# Patient Record
Sex: MALE | Race: WHITE | HISPANIC OR LATINO | Employment: FULL TIME | URBAN - METROPOLITAN AREA
[De-identification: names, ages, dates, MRNs, and addresses within clinical notes are randomized per-mention and may not be internally consistent; named-entity substitution may affect disease eponyms.]

---

## 2017-02-21 ENCOUNTER — APPOINTMENT (OUTPATIENT)
Dept: RADIOLOGY | Facility: MEDICAL CENTER | Age: 27
End: 2017-02-21
Attending: EMERGENCY MEDICINE
Payer: COMMERCIAL

## 2017-02-21 ENCOUNTER — HOSPITAL ENCOUNTER (EMERGENCY)
Facility: MEDICAL CENTER | Age: 27
End: 2017-02-21
Attending: EMERGENCY MEDICINE
Payer: COMMERCIAL

## 2017-02-21 VITALS
HEART RATE: 102 BPM | HEIGHT: 67 IN | RESPIRATION RATE: 16 BRPM | SYSTOLIC BLOOD PRESSURE: 149 MMHG | WEIGHT: 136.91 LBS | OXYGEN SATURATION: 98 % | DIASTOLIC BLOOD PRESSURE: 109 MMHG | BODY MASS INDEX: 21.49 KG/M2 | TEMPERATURE: 98.4 F

## 2017-02-21 DIAGNOSIS — J20.8 ACUTE BRONCHITIS DUE TO OTHER SPECIFIED ORGANISMS: ICD-10-CM

## 2017-02-21 LAB
ALBUMIN SERPL BCP-MCNC: 3.6 G/DL (ref 3.2–4.9)
ALBUMIN/GLOB SERPL: 0.9 G/DL
ALP SERPL-CCNC: 136 U/L (ref 30–99)
ALT SERPL-CCNC: 6 U/L (ref 2–50)
ANION GAP SERPL CALC-SCNC: 11 MMOL/L (ref 0–11.9)
AST SERPL-CCNC: 10 U/L (ref 12–45)
BASOPHILS # BLD AUTO: 0.5 % (ref 0–1.8)
BASOPHILS # BLD: 0.02 K/UL (ref 0–0.12)
BILIRUB SERPL-MCNC: 0.5 MG/DL (ref 0.1–1.5)
BNP SERPL-MCNC: 871 PG/ML (ref 0–100)
BUN SERPL-MCNC: 40 MG/DL (ref 8–22)
CALCIUM SERPL-MCNC: 7.3 MG/DL (ref 8.5–10.5)
CHLORIDE SERPL-SCNC: 98 MMOL/L (ref 96–112)
CO2 SERPL-SCNC: 28 MMOL/L (ref 20–33)
CREAT SERPL-MCNC: 8.36 MG/DL (ref 0.5–1.4)
EOSINOPHIL # BLD AUTO: 0.11 K/UL (ref 0–0.51)
EOSINOPHIL NFR BLD: 2.5 % (ref 0–6.9)
ERYTHROCYTE [DISTWIDTH] IN BLOOD BY AUTOMATED COUNT: 47 FL (ref 35.9–50)
GFR SERPL CREATININE-BSD FRML MDRD: 8 ML/MIN/1.73 M 2
GLOBULIN SER CALC-MCNC: 4.2 G/DL (ref 1.9–3.5)
GLUCOSE SERPL-MCNC: 91 MG/DL (ref 65–99)
HCT VFR BLD AUTO: 30.7 % (ref 42–52)
HGB BLD-MCNC: 10 G/DL (ref 14–18)
IMM GRANULOCYTES # BLD AUTO: 0.01 K/UL (ref 0–0.11)
IMM GRANULOCYTES NFR BLD AUTO: 0.2 % (ref 0–0.9)
LYMPHOCYTES # BLD AUTO: 1.05 K/UL (ref 1–4.8)
LYMPHOCYTES NFR BLD: 24.1 % (ref 22–41)
MCH RBC QN AUTO: 30.7 PG (ref 27–33)
MCHC RBC AUTO-ENTMCNC: 32.6 G/DL (ref 33.7–35.3)
MCV RBC AUTO: 94.2 FL (ref 81.4–97.8)
MONOCYTES # BLD AUTO: 0.25 K/UL (ref 0–0.85)
MONOCYTES NFR BLD AUTO: 5.7 % (ref 0–13.4)
NEUTROPHILS # BLD AUTO: 2.92 K/UL (ref 1.82–7.42)
NEUTROPHILS NFR BLD: 67 % (ref 44–72)
NRBC # BLD AUTO: 0 K/UL
NRBC BLD AUTO-RTO: 0 /100 WBC
PLATELET # BLD AUTO: 188 K/UL (ref 164–446)
PMV BLD AUTO: 10.4 FL (ref 9–12.9)
POTASSIUM SERPL-SCNC: 4.8 MMOL/L (ref 3.6–5.5)
PROT SERPL-MCNC: 7.8 G/DL (ref 6–8.2)
RBC # BLD AUTO: 3.26 M/UL (ref 4.7–6.1)
SODIUM SERPL-SCNC: 137 MMOL/L (ref 135–145)
WBC # BLD AUTO: 4.4 K/UL (ref 4.8–10.8)

## 2017-02-21 PROCEDURE — 71020 DX-CHEST-2 VIEWS: CPT

## 2017-02-21 PROCEDURE — 99283 EMERGENCY DEPT VISIT LOW MDM: CPT

## 2017-02-21 PROCEDURE — 85025 COMPLETE CBC W/AUTO DIFF WBC: CPT

## 2017-02-21 PROCEDURE — 80053 COMPREHEN METABOLIC PANEL: CPT

## 2017-02-21 PROCEDURE — 83880 ASSAY OF NATRIURETIC PEPTIDE: CPT

## 2017-02-21 PROCEDURE — 36415 COLL VENOUS BLD VENIPUNCTURE: CPT

## 2017-02-21 RX ORDER — AZITHROMYCIN 250 MG/1
TABLET, FILM COATED ORAL
Qty: 6 TAB | Refills: 0 | Status: SHIPPED | OUTPATIENT
Start: 2017-02-21 | End: 2017-02-28

## 2017-02-21 NOTE — ED AVS SNAPSHOT
Welocalize Access Code: [email or web address]  Expires: 3/14/2017 10:19 AM    Your email address is not on file at [facility].  Email Addresses are required for you to sign up for Welocalize, please contact [phone] to verify your personal information and to provide your email address prior to attempting to register for Welocalize.    Otoniel Wing  [address] [facility] [facility] [address] [place] [address]    Welocalize  A secure, online tool to manage your health information     [facility]’s Welocalize® is a secure, online tool that connects you to your personalized health information from the privacy of your home -- day or night - making it very easy for you to manage your healthcare. Once the activation process is completed, you can even access your medical information using the Welocalize joshua, which is available for free in the Apple Joshua store or Google Play store.     To learn more about Welocalize, visit [email or web address]    There are two levels of access available (as shown below):   My Chart Features  [facility] Primary Care Doctor [facility]  Specialists [facility]  Urgent  Care Non-[facility] Primary Care Doctor   Email your healthcare team securely and privately 24/7 X X X    Manage appointments: schedule your next appointment; view details of past/upcoming appointments X      Request prescription refills. X      View recent personal medical records, including lab and immunizations X X X X   View health record, including health history, allergies, medications X X X X   Read reports about your outpatient visits, procedures, consult and ER notes X X X X   See your discharge summary, which is a recap of your hospital and/or ER visit that includes your diagnosis, lab results, and care plan X X  X     How to register for [email or web address]:  Once your e-mail address has been verified, follow the following steps to sign up for [email or web address].     1. Go to  [email or web address]  2. Click on the Sign Up Now box, which takes you to the New Member  Sign Up page. You will need to provide the following information:  a. Enter your [employer] Access Code exactly as it appears at the top of this page. (You will not need to use this code after you’ve completed the sign-up process. If you do not sign up before the expiration date, you must request a new code.)   b. Enter your date of birth.   c. Enter your home email address.   d. Click Submit, and follow the next screen’s instructions.  3. Create a [facility]Plan ID. This will be your [facility] login ID and cannot be changed, so think of one that is secure and easy to remember.  4. Create a [employer] password. You can change your password at any time.  5. Enter your Password Reset Question and Answer. This can be used at a later time if you forget your password.   6. Enter your e-mail address. This allows you to receive e-mail notifications when new information is available in [employer].  7. Click Sign Up. You can now view your health information.    For assistance activating your [employer] account, call [phone]

## 2017-02-21 NOTE — ED AVS SNAPSHOT
Home Care Instructions                                                                                                                Otoniel Wing   MRN: [id number]    Department:  [facility], Emergency Dept   Date of Visit:  2/21/2017            [facility], Emergency Dept    [address]    [place] [address]    Phone:  [phone]      You were seen by     Wilfredo Gaffney M.D.      Your Diagnosis Was     Acute bronchitis due to other specified organisms     J20.8       Follow-up Information     1. Schedule an appointment as soon as possible for a visit with Pcp Pt States None.    Specialty:  Family Medicine      Medication Information     Review all of your home medications and newly ordered medications with your primary doctor and/or pharmacist as soon as possible. Follow medication instructions as directed by your doctor and/or pharmacist.     Please keep your complete medication list with you and share with your physician. Update the information when medications are discontinued, doses are changed, or new medications (including over-the-counter products) are added; and carry medication information at all times in the event of emergency situations.               Medication List      START taking these medications        Instructions    azithromycin 250 MG Tabs   Commonly known as:  ZITHROMAX    Take two tabs by mouth on day one, then one tab by mouth daily on days 2-5.         ASK your doctor about these medications        Instructions    calcitRIOL 0.5 MCG Caps   Commonly known as:  ROCALTROL    Take 1 Cap by mouth 2 Times a Day.   Dose:  0.5 mcg       Calcium Carbonate Antacid 1000 MG Chew    Take 6 Tabs by mouth every 4 hours.   Dose:  6000 mg       * fluoxetine 10 MG tablet   Commonly known as:  PROZAC    Take 1/2 tablet in the morning x 7 days and then increase to 1 tablet       * fluoxetine 20 MG tablet   Commonly known as:  PROZAC    Take 1 Tab by  mouth every day. Start after a month on 10 mg dose.   Dose:  20 mg       * Notice:  This list has 2 medication(s) that are the same as other medications prescribed for you. Read the directions carefully, and ask your doctor or other care provider to review them with you.            Procedures and tests performed during your visit     BTYPE NATRIURETIC PEPTIDE    CBC WITH DIFFERENTIAL    COMP METABOLIC PANEL    DX-CHEST-2 VIEWS    ESTIMATED GFR        Discharge Instructions       Acute Bronchitis  Bronchitis is inflammation of the airways that extend from the windpipe into the lungs (bronchi). The inflammation often causes mucus to develop. This leads to a cough, which is the most common symptom of bronchitis.   In acute bronchitis, the condition usually develops suddenly and goes away over time, usually in a couple weeks. Smoking, allergies, and asthma can make bronchitis worse. Repeated episodes of bronchitis may cause further lung problems.   CAUSES  Acute bronchitis is most often caused by the same virus that causes a cold. The virus can spread from person to person (contagious) through coughing, sneezing, and touching contaminated objects.  SIGNS AND SYMPTOMS   · Cough.    · Fever.    · Coughing up mucus.    · Body aches.    · Chest congestion.    · Chills.    · Shortness of breath.    · Sore throat.    DIAGNOSIS   Acute bronchitis is usually diagnosed through a physical exam. Your health care provider will also ask you questions about your medical history. Tests, such as chest X-rays, are sometimes done to rule out other conditions.   TREATMENT   Acute bronchitis usually goes away in a couple weeks. Oftentimes, no medical treatment is necessary. Medicines are sometimes given for relief of fever or cough. Antibiotic medicines are usually not needed but may be prescribed in certain situations. In some cases, an inhaler may be recommended to help reduce shortness of breath and control the cough. A cool mist  vaporizer may also be used to help thin bronchial secretions and make it easier to clear the chest.   HOME CARE INSTRUCTIONS  · Get plenty of rest.    · Drink enough fluids to keep your urine clear or pale yellow (unless you have a medical condition that requires fluid restriction). Increasing fluids may help thin your respiratory secretions (sputum) and reduce chest congestion, and it will prevent dehydration.    · Take medicines only as directed by your health care provider.  · If you were prescribed an antibiotic medicine, finish it all even if you start to feel better.  · Avoid smoking and secondhand smoke. Exposure to cigarette smoke or irritating chemicals will make bronchitis worse. If you are a smoker, consider using nicotine gum or skin patches to help control withdrawal symptoms. Quitting smoking will help your lungs heal faster.    · Reduce the chances of another bout of acute bronchitis by washing your hands frequently, avoiding people with cold symptoms, and trying not to touch your hands to your mouth, nose, or eyes.    · Keep all follow-up visits as directed by your health care provider.    SEEK MEDICAL CARE IF:  Your symptoms do not improve after 1 week of treatment.   SEEK IMMEDIATE MEDICAL CARE IF:  · You develop an increased fever or chills.    · You have chest pain.    · You have severe shortness of breath.  · You have bloody sputum.    · You develop dehydration.  · You faint or repeatedly feel like you are going to pass out.  · You develop repeated vomiting.  · You develop a severe headache.  MAKE SURE YOU:   · Understand these instructions.  · Will watch your condition.  · Will get help right away if you are not doing well or get worse.     This information is not intended to replace advice given to you by your health care provider. Make sure you discuss any questions you have with your health care provider.     Document Released: 01/25/2006 Document Revised: 01/08/2016 Document Reviewed:  06/10/2014  Elsevier Interactive Patient Education ©2016 Elsevier Inc.            Patient Information     Patient Information    Following emergency treatment: all patient requiring follow-up care must return either to a private physician or a clinic if your condition worsens before you are able to obtain further medical attention, please return to the emergency room.     Billing Information    At [facility], we work to make the billing process streamlined for our patients.  Our Representatives are here to answer any questions you may have regarding your hospital bill.  If you have insurance coverage and have supplied your insurance information to us, we will submit a claim to your insurer on your behalf.  Should you have any questions regarding your bill, we can be reached online or by phone as follows:  Online: You are able pay your bills online or live chat with our representatives about any billing questions you may have. We are here to help Monday - Friday from 8:00am to 7:30pm and 9:00am - 12:00pm on Saturdays.  Please visit [email or web address]  for more information.   Phone:  [phone] or [phone]    Please note that your emergency physician, surgeon, pathologist, radiologist, anesthesiologist, and other specialists are not employed by [facility] and will therefore bill separately for their services.  Please contact them directly for any questions concerning their bills at the numbers below:     Emergency Physician Services:  [phone]  [facility]:  [phone]  [facility]:  [phone]  [facility]:  [phone]    1. Your final bill may vary from the amount quoted upon discharge if all procedures are not complete at that time, or if your doctor has additional procedures of which we are not aware. You will receive an additional bill if you return to the Emergency Department at [facility] for suture removal  regardless of the facility of which the sutures were placed.     2. Please arrange for settlement of this account at the emergency registration.    3. All self-pay accounts are due in full at the time of treatment.  If you are unable to meet this obligation then payment is expected within 4-5 days.     4. If you have had radiology studies (CT, X-ray, Ultrasound, MRI), you have received a preliminary result during your emergency department visit. Please contact the radiology department [phone] to receive a copy of your final result. Please discuss the Final result with your primary physician or with the follow up physician provided.     Crisis Hotline:  [place] Crisis Hotline:  1-536-DUIABCO or 1-284.147.4260  [place] Crisis Hotline:    1-591.380.6290 or 537-090-2022         ED Discharge Follow Up Questions    1. In order to provide you with very good care, we would like to follow up with a phone call in the next few days.  May we have your permission to contact you?     YES /  NO    2. What is the best phone number to call you? (       )_____-__________    3. What is the best time to call you?      Morning  /  Afternoon  /  Evening                   Patient Signature:  ____________________________________________________________    Date:  ____________________________________________________________      Your appointments     Feb 28, 2017 11:00 AM   Follow Up Med Management with Cherise Alex M.D.   [facility] [address] [place] [address])    [address] [place] [address]   [phone]

## 2017-02-22 NOTE — DISCHARGE INSTRUCTIONS
Acute Bronchitis  Bronchitis is inflammation of the airways that extend from the windpipe into the lungs (bronchi). The inflammation often causes mucus to develop. This leads to a cough, which is the most common symptom of bronchitis.   In acute bronchitis, the condition usually develops suddenly and goes away over time, usually in a couple weeks. Smoking, allergies, and asthma can make bronchitis worse. Repeated episodes of bronchitis may cause further lung problems.   CAUSES  Acute bronchitis is most often caused by the same virus that causes a cold. The virus can spread from person to person (contagious) through coughing, sneezing, and touching contaminated objects.  SIGNS AND SYMPTOMS   · Cough.    · Fever.    · Coughing up mucus.    · Body aches.    · Chest congestion.    · Chills.    · Shortness of breath.    · Sore throat.    DIAGNOSIS   Acute bronchitis is usually diagnosed through a physical exam. Your health care provider will also ask you questions about your medical history. Tests, such as chest X-rays, are sometimes done to rule out other conditions.   TREATMENT   Acute bronchitis usually goes away in a couple weeks. Oftentimes, no medical treatment is necessary. Medicines are sometimes given for relief of fever or cough. Antibiotic medicines are usually not needed but may be prescribed in certain situations. In some cases, an inhaler may be recommended to help reduce shortness of breath and control the cough. A cool mist vaporizer may also be used to help thin bronchial secretions and make it easier to clear the chest.   HOME CARE INSTRUCTIONS  · Get plenty of rest.    · Drink enough fluids to keep your urine clear or pale yellow (unless you have a medical condition that requires fluid restriction). Increasing fluids may help thin your respiratory secretions (sputum) and reduce chest congestion, and it will prevent dehydration.    · Take medicines only as directed by your health care provider.  · If  you were prescribed an antibiotic medicine, finish it all even if you start to feel better.  · Avoid smoking and secondhand smoke. Exposure to cigarette smoke or irritating chemicals will make bronchitis worse. If you are a smoker, consider using nicotine gum or skin patches to help control withdrawal symptoms. Quitting smoking will help your lungs heal faster.    · Reduce the chances of another bout of acute bronchitis by washing your hands frequently, avoiding people with cold symptoms, and trying not to touch your hands to your mouth, nose, or eyes.    · Keep all follow-up visits as directed by your health care provider.    SEEK MEDICAL CARE IF:  Your symptoms do not improve after 1 week of treatment.   SEEK IMMEDIATE MEDICAL CARE IF:  · You develop an increased fever or chills.    · You have chest pain.    · You have severe shortness of breath.  · You have bloody sputum.    · You develop dehydration.  · You faint or repeatedly feel like you are going to pass out.  · You develop repeated vomiting.  · You develop a severe headache.  MAKE SURE YOU:   · Understand these instructions.  · Will watch your condition.  · Will get help right away if you are not doing well or get worse.     This information is not intended to replace advice given to you by your health care provider. Make sure you discuss any questions you have with your health care provider.     Document Released: 01/25/2006 Document Revised: 01/08/2016 Document Reviewed: 06/10/2014  [employer] Interactive Patient Education ©2016 [employer]

## 2017-02-22 NOTE — ED NOTES
ERP at bedside.  Pt states he has CHF and that he has not been able to tolerate his dialysis due the pain in his chest r/t to his cold symptoms.

## 2017-02-22 NOTE — ED NOTES
Patient has had a cold for a month now.  States breathing in is difficult which causes him to cough.  No noted temp fevers but some chills off and on.  Has not taken anything for it.  Says he feels fatigued from it.  Patient has a history significant for CKD.  On dialysis currently for this.   No major sob noted, no major congestion on assessment.  Vitals stable.

## 2017-02-22 NOTE — ED NOTES
Pt given discharge and follow up instructions and prescriptions, all questions answered, pt verbalized understanding.  Copy of discharge provided to pt. Signed copy in chart.  Pt states that all personal belongings are in possession.

## 2017-02-22 NOTE — ED PROVIDER NOTES
ED Provider Note    CHIEF COMPLAINT  Chief Complaint   Patient presents with   • Cold Symptoms       HPI  Otoniel Wing is a 26 y.o. male who presents for evaluation of cough, congestion, and fever. The patient states that his symptoms started one month ago. He states that the 1st 2 weeks of his symptoms he had nasal congestion, dry cough, and intermittent fever. The patient states that over the last 2 weeks he has been having an associated increase in his cough as well as some shortness of breath. The patient states that particularly over the last 2 days he's been having difficulty sleeping at night secondary to significant shortness of breath when he attempts to lie flat. The patient has a history of congestive heart failure as well as end-stage renal disease on dialysis, and states that he is been less well able to tolerate his dialysis sessions particularly over the last week secondary to his shortness of breath. Due to this constellation of symptoms, patient decided to present here for evaluation.    REVIEW OF SYSTEMS   Patient denies fever, vision change, endorses sore throat, denies chest pain, endorses shortness of breath, denies nausea, dysuria, focal muscle pain, rashes, or neurologic deficits     PAST MEDICAL HISTORY   has a past medical history of Changing skin lesion (8/17/2009); Renal disorder (L arm fistula); Renal failure; Hypertension; CHF (congestive heart failure) (CMS-[facility]) (1/27/2014); Indigestion; Anesthesia; Encounter for renal dialysis; History of anemia; Pneumonia (8/2015); Breath shortness; Chest pain (11/18/2014); and Pain.    SOCIAL HISTORY  Social History     Social History Main Topics   • Smoking status: Current Every Day Smoker -- 0.25 packs/day for 8 years     Types: Cigarettes   • Smokeless tobacco: Never Used      Comment: Smokes 6 cigarettes daily   • Alcohol Use: Yes      Comment: Social drinking, drinks 6 shots once every 2 weeks   • Drug Use: Yes      Comment:  Marijuana every 6 months    • Sexual Activity: No       SURGICAL HISTORY   has past surgical history that includes av fistula creation (2005); av fistula revision (10/13/2009); cath placement (10/13/2009); appendectomy laparoscopic (11/10/2009); av fistula revision (10/28/2013); av fistula creation (12/22/2014); and parathyroid exploration (8/10/2016).    CURRENT MEDICATIONS  Home Medications     Reviewed by Camila Ly R.N. (Registered Nurse) on 02/21/17 at 1749  Med List Status: <None>    Medication Last Dose Status    calcitRIOL (ROCALTROL) 0.5 MCG Cap not taking Active    calcium carbonate 1000 MG Chew Tab  Active    fluoxetine (PROZAC) 10 MG tablet  Active    fluoxetine (PROZAC) 20 MG tablet  Active                ALLERGIES  Allergies   Allergen Reactions   • Bloodless    • Vancomycin Itching     Pt states itching.       PHYSICAL EXAM  VITAL SIGNS: /102 mmHg  Pulse 103  Temp(Src) 36.9 °C (98.4 °F)  Resp 16  Wt 62.1 kg (136 lb 14.5 oz)  SpO2 100%   Pulse ox interpretation: I interpret this pulse ox as normal.  Constitutional: Alert in no apparent distress.  HENT: Head normocephalic, atraumatic, Bilateral external ears normal, Nose normal.  Eyes: Pupils are equal, extraocular movements intact, Conjunctiva normal, Non-icteric.   Neck: Normal range of motion, Supple, No stridor.   Lymphatic: No lymphadenopathy noted.   Cardiovascular: Regular rate and rhythm, no rubs murmurs or gallops   Thorax & Lungs: Lungs clear to auscultation bilaterally, No acute respiratory distress, No wheezing, No increased work of breathing.   Abdomen: Soft, nontender, Non-distended   Skin: Warm, Dry, No erythema, No rash.   Back: No bony tenderness, No CVA tenderness.   Extremities: Intact distal pulses, No edema, No tenderness, No cyanosis   Musculoskeletal: Good range of motion in all major joints. No tenderness to palpation or major deformities noted.   Neurologic: Alert , Normal motor function, Normal sensory function,  No focal deficits noted.   Psychiatric: Affect normal, Judgment normal, Mood normal.       DIAGNOSTIC STUDIES / PROCEDURES    LABS  Labs Reviewed   COMP METABOLIC PANEL - Abnormal; Notable for the following:     Bun 40 (*)     Creatinine 8.36 (*)     Calcium 7.3 (*)     AST(SGOT) 10 (*)     Alkaline Phosphatase 136 (*)     Globulin 4.2 (*)     All other components within normal limits   CBC WITH DIFFERENTIAL - Abnormal; Notable for the following:     WBC 4.4 (*)     RBC 3.26 (*)     Hemoglobin 10.0 (*)     Hematocrit 30.7 (*)     MCHC 32.6 (*)     All other components within normal limits   BTYPE NATRIURETIC PEPTIDE - Abnormal; Notable for the following:     B Natriuretic Peptide 871 (*)     All other components within normal limits   ESTIMATED GFR - Abnormal; Notable for the following:     GFR If  9 (*)     GFR If Non  8 (*)     All other components within normal limits         RADIOLOGY  DX-CHEST-2 VIEWS   Final Result         1.  Diffuse hazy reticular pulmonary parenchymal prominence, similar to prior study, appearance favors changes of chronic underlying lung disease.   2.  Changes of COPD          COURSE & MEDICAL DECISION MAKING  Pertinent Labs & Imaging studies reviewed. (See chart for details)    Patient with a history of chronic renal failure, on dialysis, as well as congestive heart failure presenting here for couple days worth of increased shortness of breath and cough. Here on physical examination the patient has no signs of fluid overload, and chest x-ray shows no evidence of fluid overload either. Additionally laboratory analysis shows a elevated BNP but I think this may be secondary to the patient's underlying renal failure as opposed to an alternative etiology. The patient does have some hazy changes in the pulmonary parenchyma on chest x-ray, and given this, the patient is given his chronic medical problems, poorly dispositioned to have an infection go untreated.  Given this, the patient likely has more benefit from antibiotics and potential harm. Given this, he'll be started on a prescription for azithromycin and treatment for acute bronchitis over the next couple of days. The patient will return here should he develop increasing shortness of breath or any other new or concerning symptoms. Also the patient was counseled regarding his vital signs, and recommended follow-up with his primary care doctor on an outpatient basis.    The patient will return for worsening symptoms and is stable at the time of discharge. The patient verbalizes understanding.    The patient is referred to a primary physician for blood pressure management, diabetic screening, and for all other preventative health concerns should they be present.     FINAL IMPRESSION  1. Acute bronchitis  2.   3.          Electronically signed by: Wilfredo Gaffney, 2/21/2017 7:49 PM    This record was made with a voice recognition software. I have tried to correct any grammar, spelling or context errors to the best of my ability, but errors may still remain. Interpretation of this chart should be taken in this context.

## 2017-02-27 ENCOUNTER — OFFICE VISIT (OUTPATIENT)
Dept: URGENT CARE | Facility: CLINIC | Age: 27
End: 2017-02-27
Payer: COMMERCIAL

## 2017-02-27 VITALS
HEIGHT: 68 IN | RESPIRATION RATE: 14 BRPM | OXYGEN SATURATION: 96 % | DIASTOLIC BLOOD PRESSURE: 98 MMHG | SYSTOLIC BLOOD PRESSURE: 152 MMHG | WEIGHT: 130.07 LBS | TEMPERATURE: 99 F | BODY MASS INDEX: 19.71 KG/M2 | HEART RATE: 118 BPM

## 2017-02-27 DIAGNOSIS — J20.8 ACUTE BACTERIAL BRONCHITIS: ICD-10-CM

## 2017-02-27 DIAGNOSIS — Z99.2 DIALYSIS PATIENT (HCC): ICD-10-CM

## 2017-02-27 DIAGNOSIS — B96.89 ACUTE BACTERIAL BRONCHITIS: ICD-10-CM

## 2017-02-27 DIAGNOSIS — R06.2 WHEEZING: ICD-10-CM

## 2017-02-27 PROCEDURE — 99214 OFFICE O/P EST MOD 30 MIN: CPT | Performed by: FAMILY MEDICINE

## 2017-02-27 RX ORDER — CEFDINIR 300 MG/1
300 CAPSULE ORAL
Qty: 4 CAP | Refills: 0 | Status: SHIPPED | OUTPATIENT
Start: 2017-02-27 | End: 2017-02-28

## 2017-02-27 RX ORDER — BENZONATATE 100 MG/1
200 CAPSULE ORAL 2 TIMES DAILY PRN
Qty: 30 CAP | Refills: 0 | Status: SHIPPED | OUTPATIENT
Start: 2017-02-27 | End: 2017-06-05

## 2017-02-27 RX ORDER — PREDNISONE 10 MG/1
30 TABLET ORAL EVERY MORNING
Qty: 15 TAB | Refills: 0 | Status: SHIPPED | OUTPATIENT
Start: 2017-02-27 | End: 2017-02-28

## 2017-02-27 RX ORDER — CALCIUM CARBONATE 500 MG/1
500 TABLET, CHEWABLE ORAL DAILY
COMMUNITY
End: 2017-02-28

## 2017-02-27 ASSESSMENT — ENCOUNTER SYMPTOMS
DIZZINESS: 0
FOCAL WEAKNESS: 0
CHILLS: 0
HEMOPTYSIS: 0
SHORTNESS OF BREATH: 0
FEVER: 0
ORTHOPNEA: 0

## 2017-02-27 NOTE — MR AVS SNAPSHOT
"Otoniel Wing   2017 11:30 AM   Office Visit   MRN: [id number]    Department:  [facility]   Dept Phone:  [phone]    Description:  Male : 1990   Provider:  Rodney Singh M.D.           Reason for Visit     URI uri symptoms x 3 weeks . also , vomiting  x 1 month .      Allergies as of 2017     Allergen Noted Reactions    Bloodless 2014       Vancomycin 10/26/2015   Itching    Pt states itching.      You were diagnosed with     Acute bacterial bronchitis   [533735]       Wheezing   [786.07.ICD-9-CM]       Dialysis patient (CMS-HCC)   [611003]         Vital Signs     Blood Pressure Pulse Temperature Respirations Height Weight    152/98 mmHg 118 37.2 °C (99 °F) 14 1.727 m (5' 8\") 59 kg (130 lb 1.1 oz)    Body Mass Index Oxygen Saturation Smoking Status             19.78 kg/m2 96% Current Every Day Smoker         Basic Information     Date Of Birth Sex Race Ethnicity Preferred Language    1990 Male  or   Origin (Slovak,Bermudian,Maldivian,Guamanian, etc) English      Your appointments     2017 11:00 AM   Follow Up Med Management with Cherise Alex M.D.   [facility] [address] [place] [address])    [address] [place] [address]   [phone]              Problem List              ICD-10-CM Priority Class Noted - Resolved    Cystic acne L70.0   2009 - Present    Renal failure N19   2009 - Present    Dialysis patient (CMS-HCC) Z99.2   2012 - Present    HTN (hypertension) I10   2012 - Present    Hyperkalemia E87.5   2012 - Present    ESRD (end stage renal disease) (CMS-HCC) N18.6   10/28/2013 - Present    CHF (congestive heart failure) (CMS-HCC) I50.9   2014 - Present    Pulmonary edema J81.1   2014 - Present    Anemia of chronic renal failure N18.9, D63.1   2014 - Present    Chest pain R07.9 High  2014 - Present    PNA (pneumonia) J18.9 High  2014 - Present    Volume " overload E87.70   11/19/2014 - Present    End stage renal disease (CMS-[facility]) N18.6   12/22/2014 - Present    Depressive disorder F32.9   12/28/2016 - Present      Health Maintenance        Date Due Completion Dates    IMM DTaP/Tdap/Td Vaccine (1 - Tdap) 5/23/2007 ---    IMM PNEUMOCOCCAL 19-64 (ADULT) HIGHEST RISK SERIES (2 of 3 - PCV13) 11/11/2010 11/11/2009            Current Immunizations     Influenza TIV (IM) 10/2/2016, 10/9/2015, 10/18/2014, 11/13/2013    Pneumococcal polysaccharide vaccine (PPSV-23) 11/11/2009 11:15 AM      Below and/or attached are the medications your provider expects you to take. Review all of your home medications and newly ordered medications with your provider and/or pharmacist. Follow medication instructions as directed by your provider and/or pharmacist. Please keep your medication list with you and share with your provider. Update the information when medications are discontinued, doses are changed, or new medications (including over-the-counter products) are added; and carry medication information at all times in the event of emergency situations     Allergies:  BLOODLESS - (reactions not documented)     VANCOMYCIN - Itching               Medications  Valid as of: February 27, 2017 - 12:21 PM    Generic Name Brand Name Tablet Size Instructions for use    Azithromycin (Tab) ZITHROMAX 250 MG Take two tabs by mouth on day one, then one tab by mouth daily on days 2-5.        Benzonatate (Cap) TESSALON 100 MG Take 2 Caps by mouth 2 times a day as needed for Cough.        Calcitriol (Cap) ROCALTROL 0.5 MCG Take 1 Cap by mouth 2 Times a Day.        Calcium Carbonate Antacid (Chew Tab) Calcium Carbonate Antacid 1000 MG Take 6 Tabs by mouth every 4 hours.        Calcium Carbonate Antacid (Chew Tab) TUMS 500 MG Take 500 mg by mouth every day.        Cefdinir (Cap) OMNICEF 300 MG Take 1 Cap by mouth every 48 hours for 4 doses.        FLUoxetine HCl (Tab) PROZAC 10 MG Take 1/2 tablet in the  morning x 7 days and then increase to 1 tablet        FLUoxetine HCl (Tab) PROZAC 20 MG Take 1 Tab by mouth every day. Start after a month on 10 mg dose.        PredniSONE (Tab) DELTASONE 10 MG Take 3 Tabs by mouth every morning for 5 days.        .                 Medicines prescribed today were sent to:     [facility] [address] [place] [address] (S), [place] [facility] [address] [facility]    [address] [facility] (S) [place] [address]    Phone: [phone] Fax: [phone]    Open 24 Hours?: No      Medication refill instructions:       If your prescription bottle indicates you have medication refills left, it is not necessary to call your provider’s office. Please contact your pharmacy and they will refill your medication.    If your prescription bottle indicates you do not have any refills left, you may request refills at any time through one of the following ways: The online Machinima system (except Urgent Care), by calling your provider’s office, or by asking your pharmacy to contact your provider’s office with a refill request. Medication refills are processed only during regular business hours and may not be available until the next business day. Your provider may request additional information or to have a follow-up visit with you prior to refilling your medication.   *Please Note: Medication refills are assigned a new Rx number when refilled electronically. Your pharmacy may indicate that no refills were authorized even though a new prescription for the same medication is available at the pharmacy. Please request the medicine by name with the pharmacy before contacting your provider for a refill.           Machinima Access Code: [email or web address]  Expires: 3/14/2017 10:19 AM    Machinima  A secure, online tool to manage your health information     [employer]’s Machinima® is a secure, online tool that connects you to your personalized health information from the privacy of your home -- day or night - making it very easy for you to manage your  healthcare. Once the activation process is completed, you can even access your medical information using the [employer] joshua, which is available for free in the Apple Joshua store or Google Play store.     [employer] provides the following levels of access (as shown below):   My Chart Features   [facility] Primary Care Doctor [facility]  Specialists [facility]  Urgent  Care Non-[facility]  Primary Care  Doctor   Email your healthcare team securely and privately 24/7 X X X    Manage appointments: schedule your next appointment; view details of past/upcoming appointments X      Request prescription refills. X      View recent personal medical records, including lab and immunizations X X X X   View health record, including health history, allergies, medications X X X X   Read reports about your outpatient visits, procedures, consult and ER notes X X X X   See your discharge summary, which is a recap of your hospital and/or ER visit that includes your diagnosis, lab results, and care plan. X X       How to register for [employer]:  1. Go to  [email or web address].  2. Click on the Sign Up Now box, which takes you to the New Member Sign Up page. You will need to provide the following information:  a. Enter your [employer] Access Code exactly as it appears at the top of this page. (You will not need to use this code after you’ve completed the sign-up process. If you do not sign up before the expiration date, you must request a new code.)   b. Enter your date of birth.   c. Enter your home email address.   d. Click Submit, and follow the next screen’s instructions.  3. Create a [employer] ID. This will be your [employer] login ID and cannot be changed, so think of one that is secure and easy to remember.  4. Create a [employer] password. You can change your password at any time.  5. Enter your Password Reset Question and Answer. This can be used at a later time if you forget your password.   6. Enter your e-mail address. This allows you to receive e-mail  notifications when new information is available in Qnovohart.  7. Click Sign Up. You can now view your health information.    For assistance activating your [employer] account, call [phone]

## 2017-02-27 NOTE — PROGRESS NOTES
"Subjective:      Otoniel Wing is a 26 y.o. male who presents with URI    Chief Complaint   Patient presents with   • URI     uri symptoms x 3 weeks . also , vomiting  x 1 month .        - 3 weeks w/ cough/chest congestion and sputum. Not getting better. Low grade temps Tm 99.7. No cp/sob/edema. No dizziness. Seen in ER about 4-5 days ago and had labs/XR's done. Given z-chevy. Says it helped some.     - Hx CKD/HD T/T/S.               URI   Pertinent negatives include no chest pain.       Review of Systems   Constitutional: Negative for fever and chills.   Respiratory: Negative for hemoptysis and shortness of breath.    Cardiovascular: Negative for chest pain and orthopnea.   Neurological: Negative for dizziness and focal weakness.          Objective:     /98 mmHg  Pulse 118  Temp(Src) 37.2 °C (99 °F)  Resp 14  Ht 1.727 m (5' 8\")  Wt 59 kg (130 lb 1.1 oz)  BMI 19.78 kg/m2  SpO2 96%   HR recheck 90  No pedal edema.   Physical Exam   Constitutional: He appears well-developed. No distress.   HENT:   Head: Normocephalic and atraumatic.   Mouth/Throat: Oropharynx is clear and moist.   Cardiovascular: Regular rhythm.    No murmur heard.  Pulmonary/Chest: Effort normal. He has wheezes.   Neurological: He is alert.   Skin: Skin is warm and dry.   Psychiatric: He has a normal mood and affect. Judgment normal.   Nursing note and vitals reviewed.              Assessment/Plan:         1. Acute bacterial bronchitis  cefdinir (OMNICEF) 300 MG Cap    benzonatate (TESSALON) 100 MG Cap   2. Wheezing  predniSONE (DELTASONE) 10 MG Tab       Abx QOD after dialysis  Asked him to check w/ his renal Dr's office on how often he may use tessalon before he starts taking it.       Dx & d/c instructions discussed w/ patient and/or family members. Follow up w/ Prvt Dr or here in 3-4 days if not getting better, sooner if needed,  ER if worse and UC/PCP unavailable.        Possible side effects (i.e. Rash, GI " upset/constipation, sedation, elevation of BP or sugars) of any medications given discussed.

## 2017-02-28 ENCOUNTER — APPOINTMENT (OUTPATIENT)
Dept: RADIOLOGY | Facility: MEDICAL CENTER | Age: 27
End: 2017-02-28
Attending: EMERGENCY MEDICINE
Payer: COMMERCIAL

## 2017-02-28 ENCOUNTER — HOSPITAL ENCOUNTER (OUTPATIENT)
Facility: MEDICAL CENTER | Age: 27
End: 2017-03-03
Attending: EMERGENCY MEDICINE | Admitting: HOSPITALIST
Payer: COMMERCIAL

## 2017-02-28 ENCOUNTER — RESOLUTE PROFESSIONAL BILLING HOSPITAL PROF FEE (OUTPATIENT)
Dept: HOSPITALIST | Facility: MEDICAL CENTER | Age: 27
End: 2017-02-28
Payer: COMMERCIAL

## 2017-02-28 ENCOUNTER — APPOINTMENT (OUTPATIENT)
Dept: BEHAVIORAL HEALTH | Facility: PHYSICIAN GROUP | Age: 27
End: 2017-02-28
Payer: COMMERCIAL

## 2017-02-28 DIAGNOSIS — I47.10 SVT (SUPRAVENTRICULAR TACHYCARDIA): ICD-10-CM

## 2017-02-28 DIAGNOSIS — E87.79 OTHER HYPERVOLEMIA: ICD-10-CM

## 2017-02-28 DIAGNOSIS — E87.6 HYPOKALEMIA: ICD-10-CM

## 2017-02-28 LAB
ALBUMIN SERPL BCP-MCNC: 3.9 G/DL (ref 3.2–4.9)
ALBUMIN/GLOB SERPL: 1 G/DL
ALP SERPL-CCNC: 167 U/L (ref 30–99)
ALT SERPL-CCNC: 12 U/L (ref 2–50)
ANION GAP SERPL CALC-SCNC: 13 MMOL/L (ref 0–11.9)
APAP SERPL-MCNC: <10 UG/ML (ref 10–30)
APTT PPP: 32.1 SEC (ref 24.7–36)
AST SERPL-CCNC: 12 U/L (ref 12–45)
BASOPHILS # BLD AUTO: 0.3 % (ref 0–1.8)
BASOPHILS # BLD: 0.02 K/UL (ref 0–0.12)
BILIRUB SERPL-MCNC: 0.6 MG/DL (ref 0.1–1.5)
BNP SERPL-MCNC: 1075 PG/ML (ref 0–100)
BUN SERPL-MCNC: 36 MG/DL (ref 8–22)
CALCIUM SERPL-MCNC: 9.1 MG/DL (ref 8.5–10.5)
CHLORIDE SERPL-SCNC: 97 MMOL/L (ref 96–112)
CO2 SERPL-SCNC: 25 MMOL/L (ref 20–33)
CREAT SERPL-MCNC: 6.21 MG/DL (ref 0.5–1.4)
EKG IMPRESSION: NORMAL
EOSINOPHIL # BLD AUTO: 0.01 K/UL (ref 0–0.51)
EOSINOPHIL NFR BLD: 0.1 % (ref 0–6.9)
ERYTHROCYTE [DISTWIDTH] IN BLOOD BY AUTOMATED COUNT: 47.2 FL (ref 35.9–50)
GFR SERPL CREATININE-BSD FRML MDRD: 11 ML/MIN/1.73 M 2
GLOBULIN SER CALC-MCNC: 4.1 G/DL (ref 1.9–3.5)
GLUCOSE SERPL-MCNC: 92 MG/DL (ref 65–99)
HCT VFR BLD AUTO: 30.5 % (ref 42–52)
HGB BLD-MCNC: 10.1 G/DL (ref 14–18)
IMM GRANULOCYTES # BLD AUTO: 0.02 K/UL (ref 0–0.11)
IMM GRANULOCYTES NFR BLD AUTO: 0.3 % (ref 0–0.9)
INR PPP: 1.05 (ref 0.87–1.13)
LACTATE BLD-SCNC: 1.4 MMOL/L (ref 0.5–2)
LYMPHOCYTES # BLD AUTO: 0.96 K/UL (ref 1–4.8)
LYMPHOCYTES NFR BLD: 14.4 % (ref 22–41)
MAGNESIUM SERPL-MCNC: 2.2 MG/DL (ref 1.5–2.5)
MCH RBC QN AUTO: 31.3 PG (ref 27–33)
MCHC RBC AUTO-ENTMCNC: 33.1 G/DL (ref 33.7–35.3)
MCV RBC AUTO: 94.4 FL (ref 81.4–97.8)
MONOCYTES # BLD AUTO: 0.17 K/UL (ref 0–0.85)
MONOCYTES NFR BLD AUTO: 2.5 % (ref 0–13.4)
NEUTROPHILS # BLD AUTO: 5.49 K/UL (ref 1.82–7.42)
NEUTROPHILS NFR BLD: 82.4 % (ref 44–72)
NRBC # BLD AUTO: 0 K/UL
NRBC BLD AUTO-RTO: 0 /100 WBC
PLATELET # BLD AUTO: 207 K/UL (ref 164–446)
PMV BLD AUTO: 9.9 FL (ref 9–12.9)
POTASSIUM SERPL-SCNC: 3.1 MMOL/L (ref 3.6–5.5)
PROT SERPL-MCNC: 8 G/DL (ref 6–8.2)
PROTHROMBIN TIME: 14 SEC (ref 12–14.6)
RBC # BLD AUTO: 3.23 M/UL (ref 4.7–6.1)
SODIUM SERPL-SCNC: 135 MMOL/L (ref 135–145)
T4 FREE SERPL-MCNC: 1.27 NG/DL (ref 0.53–1.43)
TROPONIN I SERPL-MCNC: 0.06 NG/ML (ref 0–0.04)
TSH SERPL DL<=0.005 MIU/L-ACNC: 0.69 UIU/ML (ref 0.3–3.7)
WBC # BLD AUTO: 6.7 K/UL (ref 4.8–10.8)

## 2017-02-28 PROCEDURE — 93005 ELECTROCARDIOGRAM TRACING: CPT | Performed by: EMERGENCY MEDICINE

## 2017-02-28 PROCEDURE — 71010 DX-CHEST-PORTABLE (1 VIEW): CPT

## 2017-02-28 PROCEDURE — 700102 HCHG RX REV CODE 250 W/ 637 OVERRIDE(OP): Performed by: HOSPITALIST

## 2017-02-28 PROCEDURE — 83605 ASSAY OF LACTIC ACID: CPT

## 2017-02-28 PROCEDURE — A9270 NON-COVERED ITEM OR SERVICE: HCPCS | Performed by: HOSPITALIST

## 2017-02-28 PROCEDURE — 90935 HEMODIALYSIS ONE EVALUATION: CPT

## 2017-02-28 PROCEDURE — 99220 PR INITIAL OBSERVATION CARE,LEVL III: CPT | Performed by: HOSPITALIST

## 2017-02-28 PROCEDURE — 85025 COMPLETE CBC W/AUTO DIFF WBC: CPT

## 2017-02-28 PROCEDURE — 99285 EMERGENCY DEPT VISIT HI MDM: CPT

## 2017-02-28 PROCEDURE — 84439 ASSAY OF FREE THYROXINE: CPT

## 2017-02-28 PROCEDURE — 36415 COLL VENOUS BLD VENIPUNCTURE: CPT

## 2017-02-28 PROCEDURE — G0378 HOSPITAL OBSERVATION PER HR: HCPCS

## 2017-02-28 PROCEDURE — 80307 DRUG TEST PRSMV CHEM ANLYZR: CPT

## 2017-02-28 PROCEDURE — 700111 HCHG RX REV CODE 636 W/ 250 OVERRIDE (IP)

## 2017-02-28 PROCEDURE — 84443 ASSAY THYROID STIM HORMONE: CPT

## 2017-02-28 PROCEDURE — 85730 THROMBOPLASTIN TIME PARTIAL: CPT

## 2017-02-28 PROCEDURE — 83735 ASSAY OF MAGNESIUM: CPT

## 2017-02-28 PROCEDURE — 85610 PROTHROMBIN TIME: CPT

## 2017-02-28 PROCEDURE — 700102 HCHG RX REV CODE 250 W/ 637 OVERRIDE(OP): Performed by: INTERNAL MEDICINE

## 2017-02-28 PROCEDURE — 80053 COMPREHEN METABOLIC PANEL: CPT

## 2017-02-28 PROCEDURE — 84484 ASSAY OF TROPONIN QUANT: CPT

## 2017-02-28 PROCEDURE — 96374 THER/PROPH/DIAG INJ IV PUSH: CPT

## 2017-02-28 PROCEDURE — A9270 NON-COVERED ITEM OR SERVICE: HCPCS | Performed by: INTERNAL MEDICINE

## 2017-02-28 PROCEDURE — 83880 ASSAY OF NATRIURETIC PEPTIDE: CPT

## 2017-02-28 PROCEDURE — 87040 BLOOD CULTURE FOR BACTERIA: CPT

## 2017-02-28 PROCEDURE — 700105 HCHG RX REV CODE 258: Performed by: EMERGENCY MEDICINE

## 2017-02-28 PROCEDURE — 700111 HCHG RX REV CODE 636 W/ 250 OVERRIDE (IP): Performed by: HOSPITALIST

## 2017-02-28 RX ORDER — METOPROLOL TARTRATE 50 MG/1
50 TABLET, FILM COATED ORAL TWICE DAILY
Status: DISCONTINUED | OUTPATIENT
Start: 2017-02-28 | End: 2017-03-02

## 2017-02-28 RX ORDER — ACETAMINOPHEN 325 MG/1
650 TABLET ORAL EVERY 6 HOURS PRN
Status: DISCONTINUED | OUTPATIENT
Start: 2017-02-28 | End: 2017-03-03 | Stop reason: HOSPADM

## 2017-02-28 RX ORDER — ONDANSETRON 2 MG/ML
4 INJECTION INTRAMUSCULAR; INTRAVENOUS EVERY 4 HOURS PRN
Status: DISCONTINUED | OUTPATIENT
Start: 2017-02-28 | End: 2017-03-03 | Stop reason: HOSPADM

## 2017-02-28 RX ORDER — LACTULOSE 20 G/30ML
30 SOLUTION ORAL
Status: DISCONTINUED | OUTPATIENT
Start: 2017-02-28 | End: 2017-03-03 | Stop reason: HOSPADM

## 2017-02-28 RX ORDER — ENEMA 19; 7 G/133ML; G/133ML
1 ENEMA RECTAL
Status: DISCONTINUED | OUTPATIENT
Start: 2017-02-28 | End: 2017-02-28

## 2017-02-28 RX ORDER — AMOXICILLIN 250 MG
1 CAPSULE ORAL NIGHTLY
Status: DISCONTINUED | OUTPATIENT
Start: 2017-02-28 | End: 2017-02-28

## 2017-02-28 RX ORDER — HEPARIN SODIUM 5000 [USP'U]/ML
5000 INJECTION, SOLUTION INTRAVENOUS; SUBCUTANEOUS EVERY 8 HOURS
Status: DISCONTINUED | OUTPATIENT
Start: 2017-02-28 | End: 2017-03-03 | Stop reason: HOSPADM

## 2017-02-28 RX ORDER — SODIUM CHLORIDE 9 MG/ML
INJECTION, SOLUTION INTRAVENOUS CONTINUOUS
Status: DISCONTINUED | OUTPATIENT
Start: 2017-02-28 | End: 2017-02-28

## 2017-02-28 RX ORDER — AMOXICILLIN 250 MG
1 CAPSULE ORAL
Status: DISCONTINUED | OUTPATIENT
Start: 2017-02-28 | End: 2017-03-03 | Stop reason: HOSPADM

## 2017-02-28 RX ORDER — POTASSIUM CHLORIDE 20 MEQ/1
40 TABLET, EXTENDED RELEASE ORAL ONCE
Status: COMPLETED | OUTPATIENT
Start: 2017-02-28 | End: 2017-02-28

## 2017-02-28 RX ORDER — ONDANSETRON 4 MG/1
4 TABLET, ORALLY DISINTEGRATING ORAL EVERY 4 HOURS PRN
Status: DISCONTINUED | OUTPATIENT
Start: 2017-02-28 | End: 2017-03-03 | Stop reason: HOSPADM

## 2017-02-28 RX ORDER — HEPARIN SODIUM 1000 [USP'U]/ML
INJECTION, SOLUTION INTRAVENOUS; SUBCUTANEOUS
Status: COMPLETED
Start: 2017-02-28 | End: 2017-02-28

## 2017-02-28 RX ORDER — BISACODYL 10 MG
10 SUPPOSITORY, RECTAL RECTAL
Status: DISCONTINUED | OUTPATIENT
Start: 2017-02-28 | End: 2017-03-03 | Stop reason: HOSPADM

## 2017-02-28 RX ORDER — PROMETHAZINE HYDROCHLORIDE 25 MG/1
12.5-25 TABLET ORAL EVERY 4 HOURS PRN
Status: DISCONTINUED | OUTPATIENT
Start: 2017-02-28 | End: 2017-03-03 | Stop reason: HOSPADM

## 2017-02-28 RX ORDER — PROMETHAZINE HYDROCHLORIDE 25 MG/1
12.5-25 SUPPOSITORY RECTAL EVERY 4 HOURS PRN
Status: DISCONTINUED | OUTPATIENT
Start: 2017-02-28 | End: 2017-03-03 | Stop reason: HOSPADM

## 2017-02-28 RX ORDER — AZITHROMYCIN 250 MG/1
250-500 TABLET, FILM COATED ORAL DAILY
Status: ON HOLD | COMMUNITY
Start: 2017-02-21 | End: 2017-03-03

## 2017-02-28 RX ORDER — HEPARIN SODIUM 1000 [USP'U]/ML
500 INJECTION, SOLUTION INTRAVENOUS; SUBCUTANEOUS
Status: DISCONTINUED | OUTPATIENT
Start: 2017-02-28 | End: 2017-03-03 | Stop reason: HOSPADM

## 2017-02-28 RX ORDER — DOCUSATE SODIUM 100 MG/1
100 CAPSULE, LIQUID FILLED ORAL 2 TIMES DAILY
Status: DISCONTINUED | OUTPATIENT
Start: 2017-02-28 | End: 2017-03-03 | Stop reason: HOSPADM

## 2017-02-28 RX ADMIN — POTASSIUM CHLORIDE 40 MEQ: 1500 TABLET, EXTENDED RELEASE ORAL at 22:17

## 2017-02-28 RX ADMIN — SODIUM CHLORIDE: 9 INJECTION, SOLUTION INTRAVENOUS at 10:18

## 2017-02-28 RX ADMIN — HEPARIN SODIUM 500 UNITS: 1000 INJECTION, SOLUTION INTRAVENOUS; SUBCUTANEOUS at 14:40

## 2017-02-28 RX ADMIN — ACETAMINOPHEN 650 MG: 325 TABLET ORAL at 20:01

## 2017-02-28 RX ADMIN — METOPROLOL TARTRATE 50 MG: 50 TABLET, FILM COATED ORAL at 18:46

## 2017-02-28 RX ADMIN — HEPARIN SODIUM 5000 UNITS: 5000 INJECTION, SOLUTION INTRAVENOUS; SUBCUTANEOUS at 20:01

## 2017-02-28 ASSESSMENT — PAIN SCALES - GENERAL
PAINLEVEL_OUTOF10: 6
PAINLEVEL_OUTOF10: 7

## 2017-02-28 ASSESSMENT — LIFESTYLE VARIABLES
TOTAL SCORE: 0
EVER HAD A DRINK FIRST THING IN THE MORNING TO STEADY YOUR NERVES TO GET RID OF A HANGOVER: NO
DO YOU DRINK ALCOHOL: YES
CONSUMPTION TOTAL: NEGATIVE
EVER FELT BAD OR GUILTY ABOUT YOUR DRINKING: NO
EVER_SMOKED: YES
TOTAL SCORE: 0
TOTAL SCORE: 0
AVERAGE NUMBER OF DAYS PER WEEK YOU HAVE A DRINK CONTAINING ALCOHOL: .5
HAVE YOU EVER FELT YOU SHOULD CUT DOWN ON YOUR DRINKING: NO
HAVE PEOPLE ANNOYED YOU BY CRITICIZING YOUR DRINKING: NO
HOW MANY TIMES IN THE PAST YEAR HAVE YOU HAD 5 OR MORE DRINKS IN A DAY: 0
PACK_YEARS: 6
ON A TYPICAL DAY WHEN YOU DRINK ALCOHOL HOW MANY DRINKS DO YOU HAVE: 3

## 2017-02-28 NOTE — IP AVS SNAPSHOT
" <p align=\"LEFT\"><IMG SRC=\"//EMRWB/blob$/Images/[email or web address]\" alt=\"Image\" WIDTH=\"50%\" HEIGHT=\"200\" BORDER=\"\"></p>                   Name:Otoniel Wing  Medical Record Number:[id number]  CSN: [id number]    YOB: 1990   Age: 26 y.o.  Sex: male  HT:1.727 m (5' 7.99\") WT: 56.6 kg (124 lb 12.5 oz)          Admit Date: 2/28/2017     Discharge Date:   Today's Date: 3/3/2017  Attending Doctor:  Abdiel Cole M.D.                  Allergies:  Bloodless and Vancomycin          Your appointments     Mar 06, 2017  1:30 PM   CARE MANAGER TELEPHONE VISIT with CARE MANAGER   [address]  [place] [address]   [phone]           ***IMPORTANT**** This is a phone visit only. Do not come into the office. The Care Manager will call you at the scheduled time for Care Manager Telephone Visit.            Mar 07, 2017  8:40 AM   Established Patient with DOMINIQUE Sanchez   [facility]    [address]  [place] [address]   [phone]           You will be receiving a confirmation call a few days before your appointment from our automated call confirmation system.            Mar 17, 2017  3:00 PM   Heart Failure New with Kimmy Godinez M.D.   [facility] (--)    [address]  [place] [address]   [phone]            Apr 07, 2017  9:00 AM   New Patient with VIVIENNE Alvarado   [facility] [address]    [address]  [place] [address]   [phone]           Please bring Photo ID, Insurance Cards, All Medication Bottles and copies of any legal documents (such as Living Will, Power of ) If speaking a language besides English please bring an adult . Please arrive 30 minutes prior for check in and registration. You will be receiving a confirmation call a few days before your appointment from our automated call " confirmation system.              Follow-up Information     1. Follow up with Pcp Pt States None.    Specialty:  Family Medicine         Medication List      Take these Medications        Instructions    benzonatate 100 MG Caps   Commonly known as:  TESSALON    Take 2 Caps by mouth 2 times a day as needed for Cough.   Dose:  200 mg       calcitRIOL 0.5 MCG Caps   Commonly known as:  ROCALTROL   Notes to Patient:  In the evening    Take 1 Cap by mouth 2 Times a Day.   Dose:  0.5 mcg       Calcium Carbonate Antacid 1000 MG Chew   Notes to Patient:  Resume home dosing    Take 4,000 mg by mouth every 6 hours.   Dose:  4000 mg       carvedilol 3.125 MG Tabs   Commonly known as:  COREG   Notes to Patient:  Take blood pressure and pulse prior to taking medication. If top number is less than 90 or pulse is less than 60 do not take medication and call cardiologist.     Take 1 Tab by mouth 2 times a day, with meals.   Dose:  3.125 mg       lisinopril 5 MG Tabs   Commonly known as:  PRINIVIL   Notes to Patient:  In the morning    Take 1 Tab by mouth every day.   Dose:  5 mg

## 2017-02-28 NOTE — IP AVS SNAPSHOT
[employer] Access Code: [email or web address]  Expires: 3/14/2017 10:19 AM    Your email address is not on file at [facility].  Email Addresses are required for you to sign up for [employer], please contact [phone] to verify your personal information and to provide your email address prior to attempting to register for [employer].    Otoniel Wing  [address] [facility] [address] [place] [address]    [employer]  A secure, online tool to manage your health information     [facility]’s [employer]® is a secure, online tool that connects you to your personalized health information from the privacy of your home -- day or night - making it very easy for you to manage your healthcare. Once the activation process is completed, you can even access your medical information using the [employer] joshua, which is available for free in the Apple Joshua store or Google Play store.     To learn more about [employer], visit [email or web address]    There are two levels of access available (as shown below):   My Chart Features  [facility] Primary Care Doctor [facility]  Specialists [facility]  Urgent  Care Non-[facility] Primary Care Doctor   Email your healthcare team securely and privately 24/7 X X X    Manage appointments: schedule your next appointment; view details of past/upcoming appointments X      Request prescription refills. X      View recent personal medical records, including lab and immunizations X X X X   View health record, including health history, allergies, medications X X X X   Read reports about your outpatient visits, procedures, consult and ER notes X X X X   See your discharge summary, which is a recap of your hospital and/or ER visit that includes your diagnosis, lab results, and care plan X X  X     How to register for [employer]:  Once your e-mail address has been verified, follow the following steps to sign up for [employer].     1. Go to  [email or web address]  2. Click on the Sign Up Now box, which takes you to the New Member  Sign Up page. You will need to provide the following information:  a. Enter your Yeexoo Access Code exactly as it appears at the top of this page. (You will not need to use this code after you’ve completed the sign-up process. If you do not sign up before the expiration date, you must request a new code.)   b. Enter your date of birth.   c. Enter your home email address.   d. Click Submit, and follow the next screen’s instructions.  3. Create a Yeexoo ID. This will be your Yeexoo login ID and cannot be changed, so think of one that is secure and easy to remember.  4. Create a Yeexoo password. You can change your password at any time.  5. Enter your Password Reset Question and Answer. This can be used at a later time if you forget your password.   6. Enter your e-mail address. This allows you to receive e-mail notifications when new information is available in Yeexoo.  7. Click Sign Up. You can now view your health information.    For assistance activating your Yeexoo account, call [phone]

## 2017-02-28 NOTE — ED NOTES
Med rec complete per Pt at bedside  Allergies reviewed  Pt completed a 5 day course of Azithromycin on 2/25  Pt also has a 4 dose Every Other Day prescription of Cefdinir which he has not started

## 2017-02-28 NOTE — IP AVS SNAPSHOT
3/3/2017          Otoniel Wing  [address] [facility] [address] [facility] [address]  [place] [address]    Dear Otoniel:    [facility] wants to ensure your discharge home is safe and you or your loved ones have had all your questions answered regarding your care after you leave the hospital.    You may receive a telephone call within two days of your discharge.  This call is to make certain you understand your discharge instructions as well as ensure we provided you with the best care possible during your stay with us.     The call will only last approximately 3-5 minutes and will be done by a nurse.    Once again, we want to ensure your discharge home is safe and that you have a clear understanding of any next steps in your care.  If you have any questions or concerns, please do not hesitate to contact us, we are here for you.  Thank you for choosing [facility] for your healthcare needs.    Sincerely,    Rafael Pascual    [facility]

## 2017-02-28 NOTE — IP AVS SNAPSHOT
" Home Care Instructions                                                                                                                  Name:Otoniel Wing  Medical Record Number:[id number]  CSN: [id number]    YOB: 1990   Age: 26 y.o.  Sex: male  HT:1.727 m (5' 7.99\") WT: 56.6 kg (124 lb 12.5 oz)          Admit Date: 2/28/2017     Discharge Date:   Today's Date: 3/3/2017  Attending Doctor:  Abdiel Cole M.D.                  Allergies:  Bloodless and Vancomycin            Discharge Instructions       Discharge Instructions    Discharged to home by car with relative. Discharged via wheelchair, hospital escort: Yes.  Special equipment needed: Not Applicable    Be sure to schedule a follow-up appointment with your primary care doctor or any specialists as instructed.     Discharge Plan:   Diet Plan: Discussed  Activity Level: Discussed  Confirmed Follow up Appointment: Appointment Scheduled  Confirmed Symptoms Management: Discussed  Medication Reconciliation Updated: Yes  Pneumococcal Vaccine Given - only chart on this line when given: Given (See MAR)  Influenza Vaccine Indication: Not indicated: Previously immunized this influenza season and > 8 years of age    I understand that a diet low in cholesterol, fat, and sodium is recommended for good health. Unless I have been given specific instructions below for another diet, I accept this instruction as my diet prescription.   Other diet: low sodium (less than 2 grams), low fat cardiac diet.    Special Instructions:   HF Patient Discharge Instructions  · Monitor your weight daily, and maintain a weight chart, to track your weight changes.   · Activity as tolerated, unless your Doctor has ordered otherwise. Other activity order  · Follow a low fat, low cholesterol, low salt diet unless instructed otherwise by your Doctor. Read the labels on the back of food products and track your intake of fat, cholesterol and salt.   · Fluid Restriction No. If a " Fluid Restriction has been ordered by your Doctor, measure fluids with a measuring cup to ensure that you are not exceeding the restriction.   · No smoking.  · Oxygen No. If your Doctor has ordered that you wear Oxygen at home, it is important to wear it as ordered.  · Did you receive an explanation from staff on the importance of taking each of your medications and why it is necessary to keep taking them unless your doctor says to stop? Yes  · Were all of your questions answered about how to manage your heart failure and what to do if you have increased signs and symptoms after you go home? Yes  · Do you feel like your heart failure care team involved you in the care treatment plan and allowed you to make decisions regarding your care while in the hospital and addressed any discharge needs you might have? Yes    See the educational handout provided at discharge for more information on monitoring your daily weight, activity and diet. This also explains more about Heart Failure, symptoms of a flare-up and some of the tests that you have undergone.     Warning Signs of a Flare-Up include:  · Swelling in the ankles or lower legs.  · Shortness of breath, while at rest, or while doing normal activities.   · Shortness of breath at night when in bed, or coughing in bed.   · Requiring more pillows to sleep at night, or needing to sit up at night to sleep.  · Feeling weak, dizzy or fatigued.     When to call your Doctor:  · [facility] [address] [place] [address] about questions regarding the discharge instructions you were given [phone].  · Call your Primary Care Physician or Cardiologist if:   1. You experience any pain radiating to your jaw or neck.  2. You have any difficulty breathing.  3. You experience weight gain of 3 lbs in a day or 5 lbs in a week.   4. You feel any palpitations or irregular heartbeats.  5. You become dizzy or lose consciousness.   If you have had an angiogram or had a pacemaker or AICD placed, and  experience:  1. Bleeding, drainage or swelling at the surgical / puncture site.  2. Fever greater than 100.0 F  3. Shock from internal defibrillator.  4. Cool and / or numb extremities.      · Is patient discharged on Warfarin / Coumadin?   No     · Is patient Post Blood Transfusion?  No    Depression / Suicide Risk    As you are discharged from this [facility] facility, it is important to learn how to keep safe from harming yourself.    Recognize the warning signs:  · Abrupt changes in personality, positive or negative- including increase in energy   · Giving away possessions  · Change in eating patterns- significant weight changes-  positive or negative  · Change in sleeping patterns- unable to sleep or sleeping all the time   · Unwillingness or inability to communicate  · Depression  · Unusual sadness, discouragement and loneliness  · Talk of wanting to die  · Neglect of personal appearance   · Rebelliousness- reckless behavior  · Withdrawal from people/activities they love  · Confusion- inability to concentrate     If you or a loved one observes any of these behaviors or has concerns about self-harm, here's what you can do:  · Talk about it- your feelings and reasons for harming yourself  · Remove any means that you might use to hurt yourself (examples: pills, rope, extension cords, firearm)  · Get professional help from the community (Mental Health, Substance Abuse, psychological counseling)  · Do not be alone:Call your Safe Contact- someone whom you trust who will be there for you.  · Call your local CRISIS HOTLINE 647-9153 or 735-559-0622  · Call your local Children's Mobile Crisis Response Team [place] (983) 307-3251 or www.[employer]  · Call the toll free National Suicide Prevention Hotlines   · National Suicide Prevention Lifeline 104-990-JYCJ (2056)  · National Hope Line Network 800-SUICIDE (676-9599)    Lisinopril tablets  What is this medicine?  LISINOPRIL (lyse IN oh pril) is an ACE  inhibitor. This medicine is used to treat high blood pressure and heart failure. It is also used to protect the heart immediately after a heart attack.  This medicine may be used for other purposes; ask your health care provider or pharmacist if you have questions.  COMMON BRAND NAME(S): Prinivil, Zestril  What should I tell my health care provider before I take this medicine?  They need to know if you have any of these conditions:  -diabetes  -heart or blood vessel disease  -immune system disease like lupus or scleroderma  -kidney disease  -low blood pressure  -previous swelling of the tongue, face, or lips with difficulty breathing, difficulty swallowing, hoarseness, or tightening of the throat  -an unusual or allergic reaction to lisinopril, other ACE inhibitors, insect venom, foods, dyes, or preservatives  -pregnant or trying to get pregnant  -breast-feeding  How should I use this medicine?  Take this medicine by mouth with a glass of water. Follow the directions on your prescription label. You may take this medicine with or without food. Take your medicine at regular intervals. Do not stop taking this medicine except on the advice of your doctor or health care professional.  Talk to your pediatrician regarding the use of this medicine in children. Special care may be needed. While this drug may be prescribed for children as young as 6 years of age for selected conditions, precautions do apply.  Overdosage: If you think you have taken too much of this medicine contact a poison control center or emergency room at once.  NOTE: This medicine is only for you. Do not share this medicine with others.  What if I miss a dose?  If you miss a dose, take it as soon as you can. If it is almost time for your next dose, take only that dose. Do not take double or extra doses.  What may interact with this medicine?  -diuretics  -lithium  -NSAIDs, medicines for pain and inflammation, like ibuprofen or naproxen  -over-the-counter  herbal supplements like hawthorn  -potassium salts or potassium supplements  -salt substitutes  This list may not describe all possible interactions. Give your health care provider a list of all the medicines, herbs, non-prescription drugs, or dietary supplements you use. Also tell them if you smoke, drink alcohol, or use illegal drugs. Some items may interact with your medicine.  What should I watch for while using this medicine?  Visit your doctor or health care professional for regular check ups. Check your blood pressure as directed. Ask your doctor what your blood pressure should be, and when you should contact him or her. Call your doctor or health care professional if you notice an irregular or fast heart beat.  Women should inform their doctor if they wish to become pregnant or think they might be pregnant. There is a potential for serious side effects to an unborn child. Talk to your health care professional or pharmacist for more information.  Check with your doctor or health care professional if you get an attack of severe diarrhea, nausea and vomiting, or if you sweat a lot. The loss of too much body fluid can make it dangerous for you to take this medicine.  You may get drowsy or dizzy. Do not drive, use machinery, or do anything that needs mental alertness until you know how this drug affects you. Do not stand or sit up quickly, especially if you are an older patient. This reduces the risk of dizzy or fainting spells. Alcohol can make you more drowsy and dizzy. Avoid alcoholic drinks.  Avoid salt substitutes unless you are told otherwise by your doctor or health care professional.  Do not treat yourself for coughs, colds, or pain while you are taking this medicine without asking your doctor or health care professional for advice. Some ingredients may increase your blood pressure.  What side effects may I notice from receiving this medicine?  Side effects that you should report to your doctor or health  care professional as soon as possible:  -abdominal pain with or without nausea or vomiting  -allergic reactions like skin rash or hives, swelling of the hands, feet, face, lips, throat, or tongue  -dark urine  -difficulty breathing  -dizzy, lightheaded or fainting spell  -fever or sore throat  -irregular heart beat, chest pain  -pain or difficulty passing urine  -redness, blistering, peeling or loosening of the skin, including inside the mouth  -unusually weak  -yellowing of the eyes or skin  Side effects that usually do not require medical attention (report to your doctor or health care professional if they continue or are bothersome):  -change in taste  -cough  -decreased sexual function or desire  -headache  -sun sensitivity  -tiredness  This list may not describe all possible side effects. Call your doctor for medical advice about side effects. You may report side effects to FDA at 2-855-FDA-9203.  Where should I keep my medicine?  Keep out of the reach of children.  Store at room temperature between 15 and 30 degrees C (59 and 86 degrees F). Protect from moisture. Keep container tightly closed. Throw away any unused medicine after the expiration date.  NOTE: This sheet is a summary. It may not cover all possible information. If you have questions about this medicine, talk to your doctor, pharmacist, or health care provider.  © 2014, Elsevier/Gold Standard. (6/22/2009 5:36:32 PM)    Carvedilol tablets  What is this medicine?  CARVEDILOL (DIVINA ve dil ol) is a beta-blocker. Beta-blockers reduce the workload on the heart and help it to beat more regularly. This medicine is used to treat high blood pressure and heart failure.  This medicine may be used for other purposes; ask your health care provider or pharmacist if you have questions.  COMMON BRAND NAME(S): Coreg  What should I tell my health care provider before I take this medicine?  They need to know if you have any of these conditions:  -circulation  problems  -diabetes  -history of heart attack or heart disease  -liver disease  -lung or breathing disease, like asthma or emphysema  -pheochromocytoma  -slow or irregular heartbeat  -thyroid disease  -an unusual or allergic reaction to carvedilol, other beta-blockers, medicines, foods, dyes, or preservatives  -pregnant or trying to get pregnant  -breast-feeding  How should I use this medicine?  Take this medicine by mouth with a glass of water. Follow the directions on the prescription label. It is best to take the tablets with food. Take your doses at regular intervals. Do not take your medicine more often than directed. Do not stop taking except on the advice of your doctor or health care professional.  Talk to your pediatrician regarding the use of this medicine in children. Special care may be needed.  Overdosage: If you think you have taken too much of this medicine contact a poison control center or emergency room at once.  NOTE: This medicine is only for you. Do not share this medicine with others.  What if I miss a dose?  If you miss a dose, take it as soon as you can. If it is almost time for your next dose, take only that dose. Do not take double or extra doses.  What may interact with this medicine?  Do not take this medicine with any of the following medications:  -sotalol  This medicine may also interact with the following medications:  -cimetidine  -clonidine  -cyclosporine  -digoxin  -MAOIs like Carbex, Eldepryl, Marplan, Nardil, and Parnate  -medicines for blood pressure, heart disease, irregular heart beat  -medicines for depression like fluoxetine and paroxetine  -medicines for diabetes  -medicines to control heart rhythm like propafenone and quinidine  -reserpine  -rifampin  This list may not describe all possible interactions. Give your health care provider a list of all the medicines, herbs, non-prescription drugs, or dietary supplements you use. Also tell them if you smoke, drink alcohol, or  use illegal drugs. Some items may interact with your medicine.  What should I watch for while using this medicine?  Check your heart rate and blood pressure regularly while you are taking this medicine. Ask your doctor or health care professional what your heart rate and blood pressure should be, and when you should contact him or her. Do not stop taking this medicine suddenly. This could lead to serious heart-related effects.  Contact your doctor or health care professional if you have difficulty breathing while taking this drug.  Check your weight daily. Ask your doctor or health care professional when you should notify him/her of any weight gain.  You may get drowsy or dizzy. Do not drive, use machinery, or do anything that requires mental alertness until you know how this medicine affects you. To reduce the risk of dizzy or fainting spells, do not sit or stand up quickly. Alcohol can make you more drowsy, and increase flushing and rapid heartbeats. Avoid alcoholic drinks.  If you have diabetes, check your blood sugar as directed. Tell your doctor if you have changes in your blood sugar while you are taking this medicine.  If you are going to have surgery, tell your doctor or health care professional that you are taking this medicine.  What side effects may I notice from receiving this medicine?  Side effects that you should report to your doctor or health care professional as soon as possible:  -allergic reactions like skin rash, itching or hives, swelling of the face, lips, or tongue  -breathing problems  -dark urine  -irregular heartbeat  -swollen legs or ankles  -vomiting  -yellowing of the eyes or skin  Side effects that usually do not require medical attention (report to your doctor or health care professional if they continue or are bothersome):  -change in sex drive or performance  -diarrhea  -dry eyes (especially if wearing contact lenses)  -dry, itching skin  -headache  -nausea  -unusually tired  This  list may not describe all possible side effects. Call your doctor for medical advice about side effects. You may report side effects to FDA at 0-143-IDT-9492.  Where should I keep my medicine?  Keep out of the reach of children.  Store at room temperature below 30 degrees C (86 degrees F). Protect from moisture. Keep container tightly closed. Throw away any unused medicine after the expiration date.  NOTE: This sheet is a summary. It may not cover all possible information. If you have questions about this medicine, talk to your doctor, pharmacist, or health care provider.  © 2014, [employer] Standard. (3/12/2009 2:39:22 PM)    Paroxysmal Supraventricular Tachycardia  Paroxysmal supraventricular tachycardia (PSVT) is when your heart beats very quickly and then suddenly stops beating so quickly. You may or may not have any symptoms when this occurs. It is usually not dangerous. It can lead to problems if it happens often or it lasts a long time.  HOME CARE   · Take medicines only as told by your doctor.  · Avoid caffeine or limit how much of it you consume as told by your doctor. Caffeine is found in coffee, tea, soda, and chocolate.  · Avoid alcohol or limit how much of it you drink as told by your doctor.  · Do not smoke.  · Try to get at least 7 hours of sleep each night.  · Find healthy ways to reduce stress.  · Do self-treatments as told by your doctor to slow down your heart (vagus nerve stimulation). Your doctor may tell you to:  ¨ Hold your breath and push, as though you are going to the bathroom.  ¨ Rub an area on one side of your neck.  ¨ Bend forward with your head between your legs.  ¨ Bend forward with your head between your legs, then cough.  ¨ Rub your eyeballs with your eyes closed.  · Maintain a healthy weight.  · Get some exercise on most days. Ask your doctor about some good activities for you.  GET HELP IF:  · You are having episodes of a fast heartbeat more often.  · Your episodes are lasting  longer.  · Your self-treatments to slow down your heart are no longer helping.  · You have new symptoms during an episode.  GET HELP RIGHT AWAY IF:  · You have chest pain.  · You have trouble breathing.  · You have an episode of a fast heartbeat that lasts longer than 20 minutes.  · You pass out (faint).  These symptoms may be an emergency. Do not wait to see if the symptoms will go away. Get medical help right away. Call your local emergency services (911 in the [place] Do not drive yourself to the hospital.     This information is not intended to replace advice given to you by your health care provider. Make sure you discuss any questions you have with your health care provider.     Document Released: 12/18/2006 Document Revised: 01/08/2016 Document Reviewed: 05/28/2015  [employer] Interactive Patient Education ©2016 [employer]        Your appointments     Mar 06, 2017  1:30 PM   CARE MANAGER TELEPHONE VISIT with CARE MANAGER   [address]  [place] [address]   [phone]           ***IMPORTANT**** This is a phone visit only. Do not come into the office. The Care Manager will call you at the scheduled time for Care Manager Telephone Visit.            Mar 07, 2017  8:40 AM   Established Patient with DOMINIQUE Sanchez   [address]  [place] [address]   [phone]           You will be receiving a confirmation call a few days before your appointment from our automated call confirmation system.            Mar 17, 2017  3:00 PM   Heart Failure New with Kimmy Godinez M.D.   [facility] (--)    [address], [address]  [place] [address]   [phone]            Apr 07, 2017  9:00 AM   New Patient with VIVIENNE Alvarado   [facility] [address]    [address]  [address]  [place] [address]   [phone]           Please bring Photo ID,  Insurance Cards, All Medication Bottles and copies of any legal documents (such as Living Will, Power of ) If speaking a language besides English please bring an adult . Please arrive 30 minutes prior for check in and registration. You will be receiving a confirmation call a few days before your appointment from our automated call confirmation system.              Follow-up Information     1. Follow up with Pcp Pt States None.    Specialty:  Family Medicine         Discharge Medication Instructions:    Below are the medications your physician expects you to take upon discharge:    Review all your home medications and newly ordered medications with your doctor and/or pharmacist. Follow medication instructions as directed by your doctor and/or pharmacist.    Please keep your medication list with you and share with your physician.               Medication List      START taking these medications        Instructions    calcitRIOL 0.5 MCG Caps   Last time this was given:  0.5 mcg on 3/3/2017  8:29 AM   Commonly known as:  ROCALTROL   Next Dose Due:  3/3/2017   Notes to Patient:  In the evening    Take 1 Cap by mouth 2 Times a Day.   Dose:  0.5 mcg       carvedilol 3.125 MG Tabs   Commonly known as:  COREG   Next Dose Due:  3/3/2017   Notes to Patient:  Take blood pressure and pulse prior to taking medication. If top number is less than 90 or pulse is less than 60 do not take medication and call cardiologist.     Take 1 Tab by mouth 2 times a day, with meals.   Dose:  3.125 mg       lisinopril 5 MG Tabs   Last time this was given:  5 mg on 3/3/2017  8:28 AM   Commonly known as:  PRINIVIL   Next Dose Due:  3/4/2017   Notes to Patient:  In the morning    Take 1 Tab by mouth every day.   Dose:  5 mg         CONTINUE taking these medications        Instructions    benzonatate 100 MG Caps   Commonly known as:  TESSALON    Take 2 Caps by mouth 2 times a day as needed for Cough.   Dose:  200 mg       Calcium  Carbonate Antacid 1000 MG Chew   Last time this was given:  1,000 mg on 3/3/2017 10:49 AM   Next Dose Due:  3/3/2017   Notes to Patient:  Resume home dosing    Take 4,000 mg by mouth every 6 hours.   Dose:  4000 mg         STOP taking these medications     azithromycin 250 MG Tabs   Commonly known as:  ZITHROMAX               Instructions           Diet / Nutrition:    Follow any diet instructions given to you by your doctor or the dietician, including how much salt (sodium) you are allowed each day.    If you are overweight, talk to your doctor about a weight reduction plan.    Activity:    Remain physically active following your doctor's instructions about exercise and activity.    Rest often.     Any time you become even a little tired or short of breath, SIT DOWN and rest.    Worsening Symptoms:    Report any of the following signs and symptoms to the doctor's office immediately:    *Pain of jaw, arm, or neck  *Chest pain not relieved by medication                               *Dizziness or loss of consciousness  *Difficulty breathing even when at rest   *More tired than usual                                       *Bleeding drainage or swelling of surgical site  *Swelling of feet, ankles, legs or stomach                 *Fever (>100ºF)  *Pink or blood tinged sputum  *Weight gain (3lbs/day or 5lbs /week)           *Shock from internal defibrillator (if applicable)  *Palpitations or irregular heartbeats                *Cool and/or numb extremities    Stroke Awareness    Common Risk Factors for Stroke include:    Age  Atrial Fibrillation  Carotid Artery Stenosis  Diabetes Mellitus  Excessive alcohol consumption  High blood pressure  Overweight   Physical inactivity  Smoking    Warning signs and symptoms of a stroke include:    *Sudden numbness or weakness of the face, arm or leg (especially on one side of the body).  *Sudden confusion, trouble speaking or understanding.  *Sudden trouble seeing in one or both  eyes.  *Sudden trouble walking, dizziness, loss of balance or coordination.Sudden severe headache with no known cause.    It is very important to get treatment quickly when a stroke occurs. If you experience any of the above warning signs, call 911 immediately.                   Disclaimer         Quit Smoking / Tobacco Use:    I understand the use of any tobacco products increases my chance of suffering from future heart disease or stroke and could cause other illnesses which may shorten my life. Quitting the use of tobacco products is the single most important thing I can do to improve my health. For further information on smoking / tobacco cessation call a Toll Free Quit Line at 1-982.399.8470 (*[employer] [place]) or 1-900.952.5680 (American [employer]) or you can access the web based program at www.[facility].org.    [place] Tobacco Users Help Line:  (926) 827-7829       Toll Free: 1-771.914.8834  Quit Tobacco Program [facility] ([phone]    Crisis Hotline:    [place] Crisis Hotline:  6-281-CGAQTYM or 1-357.139.4095    [place] Crisis Hotline:    1-612.227.7388 or 638-835-4637    Discharge Survey:   Thank you for choosing [facility]. We hope we did everything we could to make your hospital stay a pleasant one. You may be receiving a phone survey and we would appreciate your time and participation in answering the questions. Your input is very valuable to us in our efforts to improve our service to our patients and their families.        My signature on this form indicates that:    1. I have reviewed and understand the above information.  2. My questions regarding this information have been answered to my satisfaction.  3. I have formulated a plan with my discharge nurse to obtain my prescribed medications for home.                  Disclaimer         __________________________________                     __________       ________                       Patient Signature                                                  Date                    Time

## 2017-02-28 NOTE — ED PROVIDER NOTES
ED Provider Note    CHIEF COMPLAINT  Chief Complaint   Patient presents with   • Supraventricular Tachycardia (SVT)   • Shortness of Breath       HPI  Otoniel Wing is a 26 y.o. male with a history of end-stage renal disease on hemodialysis, congestive heart failure, hypertension, who presents by EMS from the dialysis center for [facility] after developing palpitations and a rapid heart rate while undergoing dialysis. The patient had been there for about 1 hour, when he began complaining of a rapid heart rate, and was noted to be on SVT. He was given a 600 mL bolus of saline, and then transferred to this facility. The patient says she has been sick with cough, congestion, and fever and chills for the past week. He denies any nausea, vomit, or diarrhea. He does complain of some pain in his chest from coughing. He denies any history of heart problems. Per EMS, the patient spontaneously converted to a sinus tachycardia in route.    REVIEW OF SYSTEMS  See HPI for further details. All other systems are negative.     PAST MEDICAL HISTORY  Past Medical History   Diagnosis Date   • Changing skin lesion 8/17/2009   • Renal disorder L arm fistula   • Renal failure    • Hypertension    • CHF (congestive heart failure) (CMS-HCC) 1/27/2014   • Indigestion    • Anesthesia      PONV-non recently   • Encounter for renal dialysis      Tues, Thurs, Sat   • History of anemia      due to renal function   • Pneumonia 8/2015   • Breath shortness      with chf exacerbation/fluid overload   • Chest pain 11/18/2014   • Pain      bone pain due to parathyroid       FAMILY HISTORY  Family History   Problem Relation Age of Onset   • Depression Mother        SOCIAL HISTORY  Social History     Social History   • Marital Status: Single     Spouse Name: N/A   • Number of Children: N/A   • Years of Education: N/A     Social History Main Topics   • Smoking status: Former Smoker -- 0.25 packs/day for 8 years     Quit date: 11/28/2016  "  • Smokeless tobacco: Never Used      Comment: Smokes 6 cigarettes daily   • Alcohol Use: Yes      Comment: 3 drinks 2 x per month   • Drug Use: No   • Sexual Activity: No     Other Topics Concern   • None     Social History Narrative       SURGICAL HISTORY  Past Surgical History   Procedure Laterality Date   • Av fistula creation  2005     left arm   • Av fistula revision  10/13/2009     Performed by ANSELMO FELIX at SURGERY [facility]   • Cath placement  10/13/2009     Performed by ANSELMO FELIX at SURGERY [facility]   • Appendectomy laparoscopic  11/10/2009     Performed by VLADISLAV PALACIO at SURGERY [facility]   • Av fistula revision  10/28/2013     Performed by Anselmo Felix M.D. at SURGERY [facility]   • Av fistula creation  12/22/2014     Performed by Anselmo Felix M.D. at SURGERY [facility]   • Parathyroid exploration  8/10/2016     Procedure: PARATHYROID EXPLORATION with BILATERAL NIMS NERVE monitoring and cervical thymectomy ;  Surgeon: Topher Ramirez M.D.;  Location: SURGERY SAME DAY [facility];  Service:        CURRENT MEDICATIONS  Home Medications     Reviewed by Nani Jean-Baptiste R.N. (Registered Nurse) on 02/28/17 at [facility]  Med List Status: Partial    Medication Last Dose Status    azithromycin (ZITHROMAX) 250 MG Tab 2/25/2017 Active    benzonatate (TESSALON) 100 MG Cap 2/27/2017 Active    calcium carbonate 1000 MG Chew Tab 2/28/2017 Active    cefdinir (OMNICEF) 300 MG Cap  Active                ALLERGIES  Allergies   Allergen Reactions   • Bloodless    • Vancomycin Itching     Pt states itching.       PHYSICAL EXAM  VITAL SIGNS: /98 mmHg  Pulse 117  Temp(Src) 36.7 °C (98.1 °F)  Resp 23  Ht 1.727 m (5' 7.99\")  Wt 61 kg (134 lb 7.7 oz)  BMI 20.45 kg/m2  SpO2 94%  Constitutional: Awake, alert, in no acute distress, Non-toxic appearance.   HENT: Atraumatic. Bilateral external ears normal, mucous membranes moist, throat nonerythematous without exudates, " nose is normal.  Eyes: PERRL, EOMI, conjunctiva moist, noninjected.  Neck: Nontender, Normal range of motion, No nuchal rigidity, No stridor.   Lymphatic: No lymphadenopathy noted.   Cardiovascular: Regular rhythm, tachycardic, rate in the 100s to 110s,, no murmurs, rubs, gallops.  Thorax & Lungs:  Good breath sounds bilaterally, diminished at the bases, some basilar crackles, no wheezes or retractions. There is mild tenderness on palpation over the anterior chest wall without crepitus or deformity.  Abdomen: Bowel sounds normal, Soft, nontender, nondistended, no rebound, guarding, masses.  Back: No CVA or spinal tenderness.  Extremities: Intact distal pulses, No edema, there is a dialysis shunt in the left upper extremity with a dialysis catheter still in place.  Skin: Warm, Dry, No rashes.   Musculoskeletal: No joint swelling or tenderness.  Neurologic: Alert & oriented x 3, sensory and motor function normal. No focal deficits.   Psychiatric: Affect normal, Judgment normal, Mood normal.     EKG  Twelve-lead EKG shows sinus tachycardia, rate of 105, left ventricular hypertrophy, normal intervals, normal axis, no acute ST-T wave elevations or ST depressions, no pathologic Q waves, no evidence of an acute injury or ischemic pattern on my reading, in comparison to previous EKG from December, 2016, there are no acute changes other than the rate.        RADIOLOGY/PROCEDURES  DX-CHEST-PORTABLE (1 VIEW)   Final Result         1. Unchanged mild hazy opacities and interstitial prominence throughout both lungs could relate to mild fluid overload or chronic changes.            COURSE & MEDICAL DECISION MAKING  Pertinent Labs & Imaging studies reviewed. (See chart for details)  The patient presents after having an episode of SVT. EKG from the outside facility shows rapid SVT. . At this time he has spontaneously converted and is now in a sinus tachycardia.  EKG shows a sinus tachycardia. Chest x-ray shows findings consistent  with fluid overload. CBC shows a white count 6700, 82% polys, hemoglobin 10.1, lactic acid 1.4, chemistry shows a potassium 3.1, BUN 36, creatinine 6.21, otherwise normal, troponin 0.06, BNP 1075.  TSH and free T4 were normal. Dr. Aparicio of nephrology was consulted, and will arrange for further dialysis of the patient today. Case was discussed with Dr. Roman for admission.    FINAL IMPRESSION  1. SVT  2. Mild hypokalemia  3. Fluid overload         Electronically signed by: Ravi Laurent, 2/28/2017 10:53 AM

## 2017-02-28 NOTE — ED NOTES
KAREEM NEWSOME from Dialysis @ [facility] for [facility] with   While receiving dialysis this am pt -180, SVT, SOB, Palpations, and hypertensive. Self converted to  in route to ED.   Per pt report, he has been battling an upper respiratory infection x 1 month with low grade fever @ 99.0 F. Afebrile now.   Arrives with fistula accessed and dressing in place.   PIV obtained and blood drawn and sent to lab.   ERP at bedside.

## 2017-03-01 ENCOUNTER — PATIENT OUTREACH (OUTPATIENT)
Dept: HEALTH INFORMATION MANAGEMENT | Facility: OTHER | Age: 27
End: 2017-03-01

## 2017-03-01 PROBLEM — J06.9 UPPER RESPIRATORY TRACT INFECTION: Status: ACTIVE | Noted: 2017-03-01

## 2017-03-01 PROBLEM — E87.6 HYPOKALEMIA: Status: ACTIVE | Noted: 2017-03-01

## 2017-03-01 LAB
ANION GAP SERPL CALC-SCNC: 9 MMOL/L (ref 0–11.9)
BUN SERPL-MCNC: 23 MG/DL (ref 8–22)
CALCIUM SERPL-MCNC: 7.7 MG/DL (ref 8.5–10.5)
CHLORIDE SERPL-SCNC: 96 MMOL/L (ref 96–112)
CO2 SERPL-SCNC: 27 MMOL/L (ref 20–33)
CREAT SERPL-MCNC: 5.31 MG/DL (ref 0.5–1.4)
DEPRECATED D DIMER PPP IA-ACNC: 531 NG/ML(D-DU)
EKG IMPRESSION: NORMAL
ERYTHROCYTE [DISTWIDTH] IN BLOOD BY AUTOMATED COUNT: 46.2 FL (ref 35.9–50)
GFR SERPL CREATININE-BSD FRML MDRD: 13 ML/MIN/1.73 M 2
GLUCOSE SERPL-MCNC: 100 MG/DL (ref 65–99)
HCT VFR BLD AUTO: 33 % (ref 42–52)
HGB BLD-MCNC: 10.5 G/DL (ref 14–18)
MCH RBC QN AUTO: 30.1 PG (ref 27–33)
MCHC RBC AUTO-ENTMCNC: 31.8 G/DL (ref 33.7–35.3)
MCV RBC AUTO: 94.6 FL (ref 81.4–97.8)
PLATELET # BLD AUTO: 258 K/UL (ref 164–446)
PMV BLD AUTO: 10.2 FL (ref 9–12.9)
POTASSIUM SERPL-SCNC: 5.8 MMOL/L (ref 3.6–5.5)
RBC # BLD AUTO: 3.49 M/UL (ref 4.7–6.1)
SODIUM SERPL-SCNC: 132 MMOL/L (ref 135–145)
WBC # BLD AUTO: 7.2 K/UL (ref 4.8–10.8)

## 2017-03-01 PROCEDURE — 93010 ELECTROCARDIOGRAM REPORT: CPT | Performed by: INTERNAL MEDICINE

## 2017-03-01 PROCEDURE — A9270 NON-COVERED ITEM OR SERVICE: HCPCS | Performed by: HOSPITALIST

## 2017-03-01 PROCEDURE — 99226 PR SUBSEQUENT OBSERVATION CARE,LEVEL III: CPT | Performed by: HOSPITALIST

## 2017-03-01 PROCEDURE — 85027 COMPLETE CBC AUTOMATED: CPT

## 2017-03-01 PROCEDURE — 36415 COLL VENOUS BLD VENIPUNCTURE: CPT

## 2017-03-01 PROCEDURE — A9270 NON-COVERED ITEM OR SERVICE: HCPCS | Performed by: INTERNAL MEDICINE

## 2017-03-01 PROCEDURE — G0378 HOSPITAL OBSERVATION PER HR: HCPCS

## 2017-03-01 PROCEDURE — 93005 ELECTROCARDIOGRAM TRACING: CPT | Performed by: HOSPITALIST

## 2017-03-01 PROCEDURE — 90471 IMMUNIZATION ADMIN: CPT

## 2017-03-01 PROCEDURE — 700102 HCHG RX REV CODE 250 W/ 637 OVERRIDE(OP): Performed by: INTERNAL MEDICINE

## 2017-03-01 PROCEDURE — 80048 BASIC METABOLIC PNL TOTAL CA: CPT

## 2017-03-01 PROCEDURE — 700102 HCHG RX REV CODE 250 W/ 637 OVERRIDE(OP): Performed by: HOSPITALIST

## 2017-03-01 PROCEDURE — 700111 HCHG RX REV CODE 636 W/ 250 OVERRIDE (IP)

## 2017-03-01 PROCEDURE — 85379 FIBRIN DEGRADATION QUANT: CPT

## 2017-03-01 PROCEDURE — 90670 PCV13 VACCINE IM: CPT | Performed by: HOSPITALIST

## 2017-03-01 PROCEDURE — 90935 HEMODIALYSIS ONE EVALUATION: CPT

## 2017-03-01 PROCEDURE — 700111 HCHG RX REV CODE 636 W/ 250 OVERRIDE (IP): Performed by: HOSPITALIST

## 2017-03-01 RX ORDER — GUAIFENESIN 600 MG/1
600 TABLET, EXTENDED RELEASE ORAL EVERY 12 HOURS
Status: DISCONTINUED | OUTPATIENT
Start: 2017-03-01 | End: 2017-03-03 | Stop reason: HOSPADM

## 2017-03-01 RX ORDER — SODIUM POLYSTYRENE SULFONATE 15 G/60ML
15 SUSPENSION ORAL; RECTAL ONCE
Status: COMPLETED | OUTPATIENT
Start: 2017-03-01 | End: 2017-03-01

## 2017-03-01 RX ORDER — HEPARIN SODIUM 1000 [USP'U]/ML
INJECTION, SOLUTION INTRAVENOUS; SUBCUTANEOUS
Status: COMPLETED
Start: 2017-03-01 | End: 2017-03-01

## 2017-03-01 RX ORDER — CALCIUM CARBONATE 500 MG/1
500 TABLET, CHEWABLE ORAL EVERY 6 HOURS PRN
Status: DISCONTINUED | OUTPATIENT
Start: 2017-03-01 | End: 2017-03-02

## 2017-03-01 RX ORDER — CALCITRIOL 0.5 UG/1
0.5 CAPSULE, LIQUID FILLED ORAL 2 TIMES DAILY
Status: DISCONTINUED | OUTPATIENT
Start: 2017-03-01 | End: 2017-03-03 | Stop reason: HOSPADM

## 2017-03-01 RX ADMIN — HEPARIN SODIUM 500 UNITS: 1000 INJECTION, SOLUTION INTRAVENOUS; SUBCUTANEOUS at 14:37

## 2017-03-01 RX ADMIN — HEPARIN SODIUM 5000 UNITS: 5000 INJECTION, SOLUTION INTRAVENOUS; SUBCUTANEOUS at 13:18

## 2017-03-01 RX ADMIN — PNEUMOCOCCAL 13-VALENT CONJUGATE VACCINE 0.5 ML: 2.2; 2.2; 2.2; 2.2; 2.2; 4.4; 2.2; 2.2; 2.2; 2.2; 2.2; 2.2; 2.2 INJECTION, SUSPENSION INTRAMUSCULAR at 05:45

## 2017-03-01 RX ADMIN — GUAIFENESIN 600 MG: 600 TABLET, EXTENDED RELEASE ORAL at 10:18

## 2017-03-01 RX ADMIN — CALCITRIOL 0.5 MCG: 0.5 CAPSULE, LIQUID FILLED ORAL at 20:52

## 2017-03-01 RX ADMIN — ACETAMINOPHEN 650 MG: 325 TABLET ORAL at 07:22

## 2017-03-01 RX ADMIN — GUAIFENESIN 600 MG: 600 TABLET, EXTENDED RELEASE ORAL at 20:52

## 2017-03-01 RX ADMIN — CALCITRIOL 0.5 MCG: 0.5 CAPSULE, LIQUID FILLED ORAL at 11:44

## 2017-03-01 RX ADMIN — METOPROLOL TARTRATE 50 MG: 50 TABLET, FILM COATED ORAL at 20:52

## 2017-03-01 RX ADMIN — METOPROLOL TARTRATE 50 MG: 50 TABLET, FILM COATED ORAL at 08:17

## 2017-03-01 RX ADMIN — SODIUM POLYSTYRENE SULFONATE 15 G: 15 SUSPENSION ORAL; RECTAL at 10:19

## 2017-03-01 RX ADMIN — HEPARIN SODIUM 5000 UNITS: 5000 INJECTION, SOLUTION INTRAVENOUS; SUBCUTANEOUS at 05:45

## 2017-03-01 RX ADMIN — HEPARIN SODIUM 5000 UNITS: 5000 INJECTION, SOLUTION INTRAVENOUS; SUBCUTANEOUS at 20:53

## 2017-03-01 ASSESSMENT — ENCOUNTER SYMPTOMS
HEARTBURN: 0
SPUTUM PRODUCTION: 1
NEUROLOGICAL NEGATIVE: 1
TINGLING: 0
GASTROINTESTINAL NEGATIVE: 1
PALPITATIONS: 0
ORTHOPNEA: 0
HEADACHES: 0
DOUBLE VISION: 0
PALPITATIONS: 1
SPEECH CHANGE: 0
BACK PAIN: 0
CONSTITUTIONAL NEGATIVE: 1
BLURRED VISION: 0
PSYCHIATRIC NEGATIVE: 1
SENSORY CHANGE: 0
MUSCULOSKELETAL NEGATIVE: 1
EYE PAIN: 0
TREMORS: 0
NECK PAIN: 0
VOMITING: 0
EYES NEGATIVE: 1
NAUSEA: 0
DIZZINESS: 0
PHOTOPHOBIA: 0
MYALGIAS: 0
RESPIRATORY NEGATIVE: 1

## 2017-03-01 ASSESSMENT — PAIN SCALES - GENERAL
PAINLEVEL_OUTOF10: 2
PAINLEVEL_OUTOF10: 0
PAINLEVEL_OUTOF10: 6
PAINLEVEL_OUTOF10: 0

## 2017-03-01 NOTE — CARE PLAN
Problem: Venous Thromboembolism (VTW)/Deep Vein Thrombosis (DVT) Prevention:  Goal: Patient will participate in Venous Thrombosis (VTE)/Deep Vein Thrombosis (DVT)Prevention Measures  Outcome: PROGRESSING AS EXPECTED    02/28/17 2000   OTHER   Risk Assessment Score 1   VTE RISK Moderate   Pharmacologic Prophylaxis Used Unfractionated Heparin         Problem: Pain Management  Goal: Pain level will decrease to patient’s comfort goal  Outcome: PROGRESSING AS EXPECTED    02/28/17 2000   OTHER   Nurse Pain Scale 0 - 10  6   Patient medicated per MAR for headache.

## 2017-03-01 NOTE — PROGRESS NOTES
2 RN skin check done with Carmela CHAVIS. No signs of skin breakdown noted at this time. Skin intact throughout.

## 2017-03-01 NOTE — PROGRESS NOTES
HEMODIALYSIS NOTES:     HD today x 3 hours per Dr. Aparicio. Initiated at 1440 and ended at 1740. Patient has -180's. Aditya RN aware. No episodes of SOB, denies pain, asymptomatic. Patient tolerated treatment. Please see paper flowsheet for details.    UF Net: 2,000 mL    Blood returned. Applied gauze and held L AVF site for 10 minutes. Verified no bleeding. Bruit and thrill present post dialysis. Instructions given to Primary RN that if bleeding occurs on the AVF site, change dressing and held the site with pressure. Report given to ABDIRAHMAN Guerin RN.

## 2017-03-01 NOTE — ED NOTES
Dialysis aware that pt has been assigned to T 814-02.   Report given to Aditya CHAVIS. All questions addressed. Aware of hx, labs, and VS. Aware that tele box needs to be brought to Dialysis so pt can be monitored.

## 2017-03-01 NOTE — CARE PLAN
Problem: Safety  Goal: Will remain free from injury  Bed locked and in lowest position. Patient up self, refuses bed alarm. Treaded socks. Call light and belongings within reach. Patient educated to call for assistance. Pt verbalized understanding. Hourly rounding in place.     Problem: Knowledge Deficit  Goal: Knowledge of disease process/condition, treatment plan, diagnostic tests, and medications will improve  Discussed POC and medications with patient, pt. verbalized understanding.

## 2017-03-01 NOTE — H&P
REASON FOR EVALUATION:  Palpitations.    PRIMARY CARE PROVIDER:  None listed.    Patient's renal doctor is Dr. Aparicio.    HISTORY OF PRESENT ILLNESS:  A 26-year-old male who was at a dialysis center,   receiving dialysis when he started complaining of palpitations.  They noted a   rapid heart rate and dialysis was stopped after an hour and patient was given   600 mL saline bolus and then transferred to [facility] for   further care and management.  The patient has a history of end-stage renal   disease, on hemodialysis, congestive heart failure, hypertension.  In the   emergency room, he did have evidence of sinus tachycardia.  The patient was   referred to me for further care and management.  Dr. Aparicio to take the patient   to the dialysis center to finish his dialysis session.  The patient did state   that for the last 1 week, he has had a nonproductive cough, congestion and   intermittent fever and chills.  No nausea, no vomiting, no abdominal pain,   diarrhea.    PAST MEDICAL HISTORY:  1.  End-stage renal disease, on dialysis.  2.  Anemia of chronic disease.  3.  History of gastroesophageal reflux disease.  4.  History of hypertension.    PAST SURGICAL HISTORY:  AV fistula creation, parathyroid exploration.    OUTPATIENT MEDICATIONS:  Zithromax 250 mg daily, Tessalon 100 mg daily,   calcium carbonate 1000 mg b.i.d., Omnicef 300 mg b.i.d.    FAMILY HISTORY:  Mother had depression.    SOCIAL HISTORY:  He is single, ex-smoker, used to smoke half pack a day for 8   years.  Drinks alcohol occasionally.  Does not use illicit drugs.    ALLERGIES:  TO VANCOMYCIN, CAUSES ITCHING.    REVIEW OF SYSTEMS:  Positive for nonproductive cough, subjective fever and   chills, congestion.  No nausea, no vomiting, no abdominal pain, no dysuria or   hematuria.  No leg swelling.  No skin rash.  No headache.  No focal deficits.    No swollen nodes.  Review of systems otherwise negative.    PHYSICAL  EXAMINATION:  VITAL SIGNS:  Blood pressure 167/98, pulse is 117, temperature is 36.7, and   respirations 23.  GENERAL:  He is a young male, not in acute distress.  Does not appear toxic.  HEENT:  Sclerae are anicteric.  Extraocular movements intact.  Oral mucosa is   moist.  NECK:  Supple.  No adenopathy appreciated in cervical or axillary area.  HEART:  S1, S2 tachy.  No murmurs appreciated.  LUNGS:  Clear.  No rales or wheezing.  ABDOMEN:  Soft, nontender, positive bowel sounds appreciated.  EXTREMITIES:  No edema noted.  No cyanosis or clubbing.  NEUROLOGIC:  He is alert, awake, oriented, no focal deficits.    LABORATORY DATA:  White count of 6, hemoglobin 10, hematocrit 30, platelet   count is 207.  Sodium 135, potassium 3.1, glucose 92, BUN of 36, creatinine   6.21.  Troponin 0.06.  BNP of 1075.  Lactic acid 1.4.    IMAGING:  X-ray of the chest reviewed by me shows hazy opacities and   interstitial prominence throughout both lungs, which appeared to be present on   an x-ray done 7 days ago.  Last reported echocardiogram done in 2014 shows   grade I diastolic dysfunction, ejection fraction of 50%.    I reviewed patient's current x-ray to an x-ray done in September 2016, patient   had similar x-ray findings.    IMPRESSION:  1.  Palpitations.  2.  Sinus tachycardia.  3.  Hypokalemia.  4.  End-stage renal disease, on dialysis.  5.  Fluid overload.  6.  History of chronic diastolic heart failure.  7.  Hypertensive urgency.  8.  Acute viral upper respiratory tract infection.    PLAN:  The patient will be monitored on telemetry.  Replace potassium by   mouth.  Lopressor 50 mg b.i.d. for the uncontrolled hypertension and   tachycardia.  This patient is not septic.  Repeat labs in a.m.  CBC, BMP.  The   patient's tachycardia should improve once the fluid is removed with dialysis.    There is no evidence of a bacterial infection at this time, hence no   antibiotics given at this time.  Blood cultures have been sent off  and will   follow up on those results.  We will continue to monitor for symptomatic   improvement.  The patient's _____ is complicated.  Medical decision is high.       ____________________________________     MD SKY VELIZ / CRISTINA    DD:  02/28/2017 22:00:13  DT:  02/28/2017 23:10:26    D#:  [id number]  Job#:  [id number]

## 2017-03-01 NOTE — PROGRESS NOTES
Report received from Aditya CHAVIS. Patient lying in bed, has complaints of HA at this time. MD paged by Aditya for tylenol. No immediate concerns for patient at this time. All safety measures in place.

## 2017-03-01 NOTE — PROGRESS NOTES
Bedside report received. Patient A&O x 4. Currently on 1 L via [place] Complains of 6/10 Headache and pleuritic chest pain will medicate per MAR. Patient complains of productive cough. Patient's potassium this AM is currently 5.8. BUN/Cr elevated. Patient had HD yesterday with 2000 ML off. Patient is asking for decongestants and Tums to be added to Mar.  POC discussed with patient. Pt verbalizes understanding. Call light and belongings with in reach. Bed locked and in lowest position, alarm and fall precautions in place.

## 2017-03-01 NOTE — PROGRESS NOTES
[facility] -  PROGRESS NOTE               Author: Gina Aparicio M.D. Date & Time: 3/1/2017  9:57 AM     HPI:  A 26-year-old  male with PMH of ESRD TTS iHD, hypertension, anemia of CKD, renal osteodystrophy, and cystic acne who presented to the hospital from the dialysis unit with a chief complaint as described above.  He states that he was at dialysis where he was usually scheduled for a session.  He is fairly compliant and has not missed any recently and that approximately hour and a half into his treatment, he was noted to have tachycardia, heart rate in the 120s-130s and chest pain with acute onset of dyspnea and shortness of breath.  He denies ever having anything similar especially while he has been on dialysis in the past and ECG obtained at the dialysis unit showed SVT.  He was given approximately 600 mL of fluid; however, his SVT does not resolve and he was still in significant distress so EMS was notified and he was transferred to the hospital for further care and evaluation.  Upon arrival, he self converted en route as reported by EMS and was in sinus tachycardia upon arrival to the ED.  He has remained in sinus tachycardia throughout his stay so far and states that his chest pain and shortness of breath have improved as well.  He denies any recent change in his diet or new medications to his knowledge and otherwise denies any recent fevers, chills, nausea, vomiting, abdominal pain, melena, hematochezia, or hematemesis.  No change in his vision or headaches.      DAILY NEPHROLOGY SUMMARY:  2/28/17 - Consult done  3/01/17 - DEEO, had more CP/palpitations overnight, K+ is up this AM    Review of Systems   Constitutional: Negative.    HENT: Negative.    Eyes: Negative.    Respiratory: Negative.    Cardiovascular: Positive for chest pain and palpitations.   Gastrointestinal: Negative.    Musculoskeletal: Negative.    Skin: Negative.    Neurological: Negative.     Psychiatric/Behavioral: Negative.    All other systems reviewed and are negative.        Physical Exam   Constitutional: He is oriented to person, place, and time. He appears well-developed and well-nourished.   HENT:   Head: Normocephalic and atraumatic.   Eyes: Conjunctivae are normal. No scleral icterus.   Neck: Neck supple. No muscular tenderness present.   Cardiovascular: Regular rhythm and normal heart sounds.  Tachycardia present.    Pulmonary/Chest: Effort normal and breath sounds normal.   Abdominal: Soft. Bowel sounds are normal.   Musculoskeletal: He exhibits no edema or tenderness.   Neurological: He is alert and oriented to person, place, and time.   Skin: Skin is warm and dry.   Psychiatric: He has a normal mood and affect. His behavior is normal.   Nursing note and vitals reviewed.        PAST FAMILY HISTORY: Reviewed and Unchanged  SOCIAL HISTORY: Reviewed and Unchanged  CURRENT MEDICATIONS: Reviewed  LABORATORY RESULTS: Reviewed  IMAGING STUDIES: Reviewed      Fluids:         IMPRESSION:  - ESRD    * Etiology unknown, never had renal bx, likely 2/2 HTN  - SVT    * Etiology unclear, could be electrolytes induced vs new onset cardiomyopathy  - HTN  - Anemia of CKD  - Renal Osteodystrophy  - Cystic acne    ASSESSMENT:  - GFR: HD dependent  - Urine: oligoanuric  - BP: Goal < 140/90  - Volume: euvolemic  - Acid/Base: no sig. acid base disturbance  - Electrolytes: hyperkalemia no ECG changes  - Anemia: Goal Hgb 10-11  - MBD: Ca low likely due to calcitriol not being resumed, PO4 unknown  - HD Access: LAVF (+thrill/bruit)    PLAN:  - Continue MWF iHD during stay  - Additional 3 hr iHD on 2K bath today for hyperK+  - Would benefit from TTE to evaluate heart function and possibly Cardiology evaluation if Tachycardia persists despite BB added  - Resume Calcitriol 0.5mcg po BID  - He needs to remain on above due to his hx of parathyroidectomy to prevent significant hypocalcemia  - Dose all meds per ESRD  guidelines  - Will follow patient closely with you

## 2017-03-01 NOTE — CONSULTS
"DATE OF SERVICE:  02/28/2017    REASON FOR CONSULTATION:  Inpatient dialysis management.    CHIEF COMPLAINT:  \"I had chest pain and fast heart rate at dialysis today.\"    HISTORY OF PRESENT ILLNESS:  A 26-year-old  male with PMH of ESRD TTS   IHD, hypertension, anemia of CKD, renal osteodystrophy, and cystic acne who   presented to the hospital from the dialysis unit with a chief complaint as   described above.  He states that he was at dialysis where he was usually   scheduled for a session.  He is fairly compliant and has not missed any   recently and that approximately hour and a half into his treatment, he was   noted to have tachycardia, heart rate in the 120s-130s and chest pain with   acute onset of dyspnea and shortness of breath.  He denies ever having   anything similar especially while he has been on dialysis in the past and ECG   obtained at the dialysis unit showed SVT.  He was given approximately 600 mL   of fluid; however, his SVT does not resolve and he was still in significant   distress so EMS was notified and he was transferred to the hospital for   further care and evaluation.  Upon arrival, he self converted en route as   reported by EMS and was in sinus tachycardia upon arrival to the ED.  He has   remained in sinus tachycardia throughout his stay so far and states that his   chest pain and shortness of breath have improved as well.  He denies any   recent change in his diet or new medications to his knowledge and otherwise   denies any recent fevers, chills, nausea, vomiting, abdominal pain, melena,   hematochezia, or hematemesis.  No change in his vision or headaches.    REVIEW OF SYSTEMS:  As per HPI, otherwise negative per AMA/CMS criteria.    PAST MEDICAL HISTORY:  1.  ESRD, TTS IHD.  2.  Hypertension.  3.  Anemia of CKD.  4.  Renal osteodystrophy.  5.  Cystic acne.    PAST SURGICAL HISTORY:  1.  Appendectomy 2010.  2.  Left AV fistula 2005.  3.  Multiple revisions and resections " on left AV fistula.  4.  Parathyroidectomy.    FAMILY HISTORY:  1.  Negative for any kind of CKD or ESRD.  2.  Mother had hypertension and diabetes.  3.  Father was murdered.  4.  Siblings are in good health.    SOCIAL HISTORY:  1.  Single, never  and works as a .  2.  He drinks socially approximately 3 times a week.  3.  Smokes cigarettes occasionally with friends, but nothing regularly.  4.  Denies any illicit drug use.    HOME MEDICATIONS:  Reviewed and documented in chart.    ALLERGIES:  VANCOMYCIN CAUSES ITCHING.    PHYSICAL EXAMINATION:  VITAL SIGNS:  Blood pressure 163/103, pulse of 113, respiratory rate 19,   temperature 98.1, pulse ox 91% on room air, weight 61 kilograms, height 172.7   centimeters, BMI 20.45.  GENERAL:  Thin  male, no apparent distress.  HEENT:  Normocephalic, atraumatic.  No scleral icterus.  Conjuctivae normal.    OP clear.  NECK:  Supple, nontender.  CARDIOVASCULAR:  Tachycardia rate, regular rhythm.  No murmurs are appreciated   secondary to his tachycardia.  LUNGS:  Clear to auscultation bilaterally.  No wheezes or rales.  ABDOMEN:  Soft, nontender, nondistended.  Positive bowel sounds.  EXTREMITIES:  No clubbing, cyanosis or edema.  SKIN:  Warm and dry.  NEUROLOGIC:  Alert and oriented.  No focal deficits.  PSYCHIATRIC:  Patient is pleasant and cooperative.    DIAGNOSTIC LABS:  WBC 6.7, hemoglobin 10.1, hematocrit 30.5, platelets 207.    Differential within normal limits.  Sodium 135, potassium 3.1, chloride 97,   CO2 25, BUN 36, creatinine 6.2, glucose 92, calcium 9.1, total protein 8.0,   albumin 3.9, total bili 0.6, AST 12, ALT 12, , lactate 1.4, troponin I   0.06, BNP 1075, PT 14, INR 1.1, PTT 32.1, TSH 0.69, free T4 1.27.    DIAGNOSTIC IMAGING:  Portable chest x-ray showed mild bilateral increased   marking consistent with interstitial edema, otherwise negative for any acute   cardiopulmonary process.    IMPRESSION:  1.  End-stage  renal disease, etiology unknown, never had a renal biopsy,   presumed secondary to hypertension/glomerulonephritis.  2.  Supraventricular tachycardia, etiology unclear, based on electrolytes   perhaps he was hypokalemic at the start of treatment and may have worsened   depending on his dialysate bath.  3.  Hypertension.  4.  Anemia of chronic kidney disease.  5.  Renal osteodystrophy.  6.  Cystic acne.    ASSESSMENT:  1.  GFR, HD dependent.  2.  Urine, oligoanuric.  3.  BP, goal less than 140/90, currently not at goal.  4.  Volume, hypovolemic.  5.  Acid base, no significant acid base disturbance noted.  6.  Electrolytes, hypokalemia present, could be culprit for his SVT, otherwise   rest of electrolytes within normal limits.  7.  Anemia, goal hemoglobin 10-11.  8.  MBD, calcium normal, PO4 unavailable.  9.  Dialysis access, left AV fistula, positive thrill/bruit.    PLAN:  1.  Continue TTS IHD during inpatient stay.  2.  We will resume for a 3-hour treatment on a 4K bath to help replete his   potassium and see if that corrects his cardiac abnormalities.  3.  Agree with admission with telemetry to monitor his heart rhythm overnight   and ensure that he does not have any other episodes of SVT or other   ventricular tachycardias.  If he does, may require a cardiology consultation.  4.  Consider obtaining a TTE to evaluate his EF as well as heart chambers.  5.  We will UF the patient as tolerated by his vital signs.  6.  Dose all mediations per ESRD guidelines.  7.  Continue his calcitriol and calcium carbonate to maintain his serum   creatinine in normal range.  8.  We will follow the patient with you closely.    Thanks for this consultation.       ____________________________________     MD JACQUES Fox / CRISTINA    DD:  02/28/2017 14:06:02  DT:  02/28/2017 18:54:43    D#:  [id number]  Job#:  [id number]

## 2017-03-01 NOTE — DISCHARGE PLANNING
Pt has OP hemodialysis at [facility] TTS 0630.  Clinic has been notified of hospitalization.

## 2017-03-01 NOTE — PROGRESS NOTES
Hospital Medicine Interval Note  Date of Service:  3/1/2017    Chief Complaint  26 y.o.-year-old male admitted 2/28/2017 with tachycardia    Interval Problem Update  Hr now wnl  No recurrence of tachycardia    Patient was on abx outpatient for ? Bronchitis    Pt states he cough up white/yellow sputum    Sputum culture obtain- just white fluid    tsh wnl    Consultants/Specialty  none    Disposition  home     Review of Systems   Constitutional: Positive for malaise/fatigue.   HENT: Negative for tinnitus.    Eyes: Negative for blurred vision, double vision, photophobia and pain.   Respiratory: Positive for sputum production.    Cardiovascular: Negative for chest pain, palpitations and orthopnea.   Gastrointestinal: Negative for heartburn, nausea and vomiting.   Genitourinary: Negative for dysuria, urgency and frequency.   Musculoskeletal: Negative for myalgias, back pain and neck pain.   Neurological: Negative for dizziness, tingling, tremors, sensory change, speech change and headaches.   All other systems reviewed and are negative.     Physical Exam Laboratory/Imaging   Filed Vitals:    02/28/17 2000 03/01/17 0000 03/01/17 0400 03/01/17 0745   BP: 144/93 121/81 114/76 135/88   Pulse: 99 83 81 92   Temp: 36.3 °C (97.3 °F) 36.3 °C (97.4 °F) 36.7 °C (98.1 °F) 36.6 °C (97.9 °F)   Resp: 20 20 20 18   Height:       Weight: 60.25 kg (132 lb 13.2 oz)      SpO2: 94% 97% 96% 99%     Physical Exam   Constitutional: He is oriented to person, place, and time. No distress.   Eyes: Left eye exhibits no discharge.   Neck: No JVD present. No thyromegaly present.   Cardiovascular: Normal rate.  Exam reveals no gallop and no friction rub.    No murmur heard.  Pulmonary/Chest: Effort normal and breath sounds normal. No respiratory distress. He has no wheezes. He has no rales.   Abdominal: Soft. Bowel sounds are normal. He exhibits no distension. There is no tenderness. There is no rebound.   Musculoskeletal: He exhibits no edema or  tenderness.   Neurological: He is alert and oriented to person, place, and time. No cranial nerve deficit. Coordination normal.   Skin: He is not diaphoretic.    Lab Results   Component Value Date/Time    WBC 7.2 03/01/2017 02:13 AM    HEMOGLOBIN 10.5* 03/01/2017 02:13 AM    HEMATOCRIT 33.0* 03/01/2017 02:13 AM    PLATELET COUNT 258 03/01/2017 02:13 AM     Lab Results   Component Value Date/Time    SODIUM 132* 03/01/2017 02:13 AM    POTASSIUM 5.8* 03/01/2017 02:13 AM    CHLORIDE 96 03/01/2017 02:13 AM    CO2 27 03/01/2017 02:13 AM    GLUCOSE 100* 03/01/2017 02:13 AM    BUN 23* 03/01/2017 02:13 AM    CREATININE 5.31* 03/01/2017 02:13 AM      Assessment/Plan    Hyperkalemia  Assessment & Plan  15gram kayaxelate given    ESRD (end stage renal disease) (CMS-HCC)  Assessment & Plan  Dialysis as per renal    SVT (supraventricular tachycardia) (CMS-HCC)  Assessment & Plan  Due to fluid overload    Hypokalemia  Assessment & Plan  resolved    Upper respiratory tract infection  Assessment & Plan  mucinex    Sputum culture ordered       Denise catheter: No Denise      DVT Prophylaxis: Heparin               Check dimer.. May need ct chest / or vq scan    Check echo

## 2017-03-02 PROBLEM — I11.0 CARDIOMYOPATHY DUE TO HYPERTENSION, WITH HEART FAILURE (HCC): Status: ACTIVE | Noted: 2017-03-02

## 2017-03-02 PROBLEM — I43 CARDIOMYOPATHY DUE TO HYPERTENSION, WITH HEART FAILURE (HCC): Status: ACTIVE | Noted: 2017-03-02

## 2017-03-02 LAB
ANION GAP SERPL CALC-SCNC: 12 MMOL/L (ref 0–11.9)
BUN SERPL-MCNC: 23 MG/DL (ref 8–22)
CALCIUM SERPL-MCNC: 7.7 MG/DL (ref 8.5–10.5)
CHLORIDE SERPL-SCNC: 93 MMOL/L (ref 96–112)
CO2 SERPL-SCNC: 31 MMOL/L (ref 20–33)
CREAT SERPL-MCNC: 5.37 MG/DL (ref 0.5–1.4)
ERYTHROCYTE [DISTWIDTH] IN BLOOD BY AUTOMATED COUNT: 46 FL (ref 35.9–50)
GFR SERPL CREATININE-BSD FRML MDRD: 13 ML/MIN/1.73 M 2
GLUCOSE SERPL-MCNC: 93 MG/DL (ref 65–99)
HCT VFR BLD AUTO: 36 % (ref 42–52)
HGB BLD-MCNC: 11.5 G/DL (ref 14–18)
LV EJECT FRACT  99904: 30
LV EJECT FRACT MOD 2C 99903: 30.43
LV EJECT FRACT MOD 4C 99902: 28.93
LV EJECT FRACT MOD BP 99901: 27.67
MCH RBC QN AUTO: 30.3 PG (ref 27–33)
MCHC RBC AUTO-ENTMCNC: 31.9 G/DL (ref 33.7–35.3)
MCV RBC AUTO: 94.7 FL (ref 81.4–97.8)
PLATELET # BLD AUTO: 223 K/UL (ref 164–446)
PMV BLD AUTO: 10.1 FL (ref 9–12.9)
POTASSIUM SERPL-SCNC: 4.9 MMOL/L (ref 3.6–5.5)
RBC # BLD AUTO: 3.8 M/UL (ref 4.7–6.1)
SODIUM SERPL-SCNC: 136 MMOL/L (ref 135–145)
WBC # BLD AUTO: 6 K/UL (ref 4.8–10.8)

## 2017-03-02 PROCEDURE — 80048 BASIC METABOLIC PNL TOTAL CA: CPT

## 2017-03-02 PROCEDURE — 700112 HCHG RX REV CODE 229: Performed by: HOSPITALIST

## 2017-03-02 PROCEDURE — 700102 HCHG RX REV CODE 250 W/ 637 OVERRIDE(OP): Performed by: HOSPITALIST

## 2017-03-02 PROCEDURE — 36415 COLL VENOUS BLD VENIPUNCTURE: CPT

## 2017-03-02 PROCEDURE — A9270 NON-COVERED ITEM OR SERVICE: HCPCS | Performed by: HOSPITALIST

## 2017-03-02 PROCEDURE — 93306 TTE W/DOPPLER COMPLETE: CPT

## 2017-03-02 PROCEDURE — A9270 NON-COVERED ITEM OR SERVICE: HCPCS | Performed by: INTERNAL MEDICINE

## 2017-03-02 PROCEDURE — 85027 COMPLETE CBC AUTOMATED: CPT

## 2017-03-02 PROCEDURE — 90935 HEMODIALYSIS ONE EVALUATION: CPT

## 2017-03-02 PROCEDURE — G0378 HOSPITAL OBSERVATION PER HR: HCPCS

## 2017-03-02 PROCEDURE — 700111 HCHG RX REV CODE 636 W/ 250 OVERRIDE (IP)

## 2017-03-02 PROCEDURE — 700111 HCHG RX REV CODE 636 W/ 250 OVERRIDE (IP): Performed by: HOSPITALIST

## 2017-03-02 PROCEDURE — 99225 PR SUBSEQUENT OBSERVATION CARE,LEVEL II: CPT | Performed by: HOSPITALIST

## 2017-03-02 PROCEDURE — 700102 HCHG RX REV CODE 250 W/ 637 OVERRIDE(OP): Performed by: INTERNAL MEDICINE

## 2017-03-02 PROCEDURE — 93306 TTE W/DOPPLER COMPLETE: CPT | Mod: 26 | Performed by: INTERNAL MEDICINE

## 2017-03-02 RX ORDER — HEPARIN SODIUM 1000 [USP'U]/ML
INJECTION, SOLUTION INTRAVENOUS; SUBCUTANEOUS
Status: COMPLETED
Start: 2017-03-02 | End: 2017-03-02

## 2017-03-02 RX ORDER — CALCIUM CARBONATE 500 MG/1
500 TABLET, CHEWABLE ORAL 4 TIMES DAILY
Status: DISCONTINUED | OUTPATIENT
Start: 2017-03-02 | End: 2017-03-03

## 2017-03-02 RX ADMIN — HEPARIN SODIUM 500 UNITS: 1000 INJECTION, SOLUTION INTRAVENOUS; SUBCUTANEOUS at 15:34

## 2017-03-02 RX ADMIN — METOPROLOL TARTRATE 25 MG: 25 TABLET, FILM COATED ORAL at 21:05

## 2017-03-02 RX ADMIN — HEPARIN SODIUM 5000 UNITS: 5000 INJECTION, SOLUTION INTRAVENOUS; SUBCUTANEOUS at 13:53

## 2017-03-02 RX ADMIN — GUAIFENESIN 600 MG: 600 TABLET, EXTENDED RELEASE ORAL at 08:38

## 2017-03-02 RX ADMIN — HEPARIN SODIUM 5000 UNITS: 5000 INJECTION, SOLUTION INTRAVENOUS; SUBCUTANEOUS at 21:05

## 2017-03-02 RX ADMIN — METOPROLOL TARTRATE 50 MG: 50 TABLET, FILM COATED ORAL at 08:38

## 2017-03-02 RX ADMIN — HEPARIN SODIUM 5000 UNITS: 5000 INJECTION, SOLUTION INTRAVENOUS; SUBCUTANEOUS at 05:18

## 2017-03-02 RX ADMIN — GUAIFENESIN 600 MG: 600 TABLET, EXTENDED RELEASE ORAL at 21:04

## 2017-03-02 RX ADMIN — DOCUSATE SODIUM 100 MG: 100 CAPSULE ORAL at 21:04

## 2017-03-02 RX ADMIN — ANTACID TABLETS 500 MG: 500 TABLET, CHEWABLE ORAL at 13:53

## 2017-03-02 RX ADMIN — ANTACID TABLETS 500 MG: 500 TABLET, CHEWABLE ORAL at 21:04

## 2017-03-02 RX ADMIN — CALCITRIOL 0.5 MCG: 0.5 CAPSULE, LIQUID FILLED ORAL at 21:05

## 2017-03-02 RX ADMIN — CALCITRIOL 0.5 MCG: 0.5 CAPSULE, LIQUID FILLED ORAL at 08:38

## 2017-03-02 RX ADMIN — ANTACID TABLETS 500 MG: 500 TABLET, CHEWABLE ORAL at 08:39

## 2017-03-02 ASSESSMENT — ENCOUNTER SYMPTOMS
PALPITATIONS: 1
DOUBLE VISION: 0
GASTROINTESTINAL NEGATIVE: 1
COUGH: 0
SENSORY CHANGE: 0
DIZZINESS: 0
EYES NEGATIVE: 1
VOMITING: 0
SPUTUM PRODUCTION: 0
BACK PAIN: 0
PALPITATIONS: 0
BLURRED VISION: 0
EYE PAIN: 0
ORTHOPNEA: 0
HEADACHES: 0
PHOTOPHOBIA: 0
HEMOPTYSIS: 0
NECK PAIN: 0
RESPIRATORY NEGATIVE: 1
MYALGIAS: 0
TREMORS: 0
SPEECH CHANGE: 0
HEARTBURN: 0
NEUROLOGICAL NEGATIVE: 1
NAUSEA: 0
MUSCULOSKELETAL NEGATIVE: 1
CONSTITUTIONAL NEGATIVE: 1
TINGLING: 0
PSYCHIATRIC NEGATIVE: 1

## 2017-03-02 ASSESSMENT — PAIN SCALES - GENERAL
PAINLEVEL_OUTOF10: 0

## 2017-03-02 NOTE — PROGRESS NOTES
Hemodialysis treatment on 3/1/2017 initiated on 1442 pm and ended on 1753 pm and was ordered by Dr. Aparicio. Please see paper flow sheets for details. Net UF: 2100 ml. Patient stable, tolerated treatment, AO x 4, afebrile, no shortness of breath noted, VSS, left arm AVF intact, no s/s of infection noted, patient w/o acute c/o or acute events during treatment and at this time. Report given to primary RN - Wilfredo Freeman RN and verified with primary RN regarding left arm AVF post HD treatment no oozing/ no bleeding at the needle access sites post HD treatment needle removal 10 minutes pressure holding. Waited for transport to transfer patient: 60 minutes.

## 2017-03-02 NOTE — ASSESSMENT & PLAN NOTE
Will defer to cards on which ace I they want to start    Decrease lopressor to 25mg po bid.( bp is low normal).. Consider transityion to coreg( defer to cards)    Dr massey of cardiology consulted

## 2017-03-02 NOTE — PROGRESS NOTES
Report received from Tomas CHAVIS. Patient sitting up in bed at this time. All safety measures in place. No immediate concerns for patient at this time.

## 2017-03-02 NOTE — CARE PLAN
Problem: Bowel/Gastric:  Goal: Normal bowel function is maintained or improved  Outcome: PROGRESSING AS EXPECTED  Patient states that he is on his normal regimen for BM    Problem: Pain Management  Goal: Pain level will decrease to patient’s comfort goal  Outcome: PROGRESSING AS EXPECTED  Patient having mild HA, does not want any medication for this.

## 2017-03-02 NOTE — PROGRESS NOTES
Bedside report received. Patient A&O x 4. VS'S. RA. No overnight events. SR on telemtry monitor. No complaints of pain or SOB at this time. Patient would like shower orders. POC discussed with patient. Pt verbalizes understanding. Call light and belongings with in reach. Bed locked and in lowest position, alarm and fall precautions in place.

## 2017-03-02 NOTE — PROGRESS NOTES
"[facility]    3.0hour hemodialysis treatment initiated at 1532 and completed at 1835. Pt stable upon assessment. Denies any pain and discomfort.  L UA AVF, (+) Thrill and Bruit,accessed w/ 15G 1\" needle, patent, no s/sx of infection noted. Dr. Aparicio notified of tx start.    Condition stable throughout tx. VSS. BP dropped towards the end of the dialysis treatment, Ns flush given for BP support.    Post HD tx, Bld returned w/ NS rinseback.  Pt stable and  VSS. No active bleeding at the needle access sites. Post HD needle removal, direct pressure to Left AVF  For 10mins. L AVF (+) thrill and bruit. Primary RN informed to monitor L AVF access for any breakthrough bleeding.      Net UF  1455mL.     Report given to Primary RN. See paper flow sheet for details.  "

## 2017-03-02 NOTE — PROGRESS NOTES
Dr. Cole called stating that patient should not go to dialysis until the physician is to see him. RN called dialysis to which Dialysis RN stated that he is not sure of the dialysis schedule since one note states that patient should be on Monday Wednesday Friday schedule while here in the hospital, patient is on a Tuesday, Thursday Saturday.

## 2017-03-02 NOTE — PROGRESS NOTES
[facility] -  PROGRESS NOTE               Author: Gina Aparicio M.D. Date & Time: 3/2/2017  1:12 PM     HPI:  A 26-year-old  male with PMH of ESRD TTS iHD, hypertension, anemia of CKD, renal osteodystrophy, and cystic acne who presented to the hospital from the dialysis unit with a chief complaint as described above.  He states that he was at dialysis where he was usually scheduled for a session.  He is fairly compliant and has not missed any recently and that approximately hour and a half into his treatment, he was noted to have tachycardia, heart rate in the 120s-130s and chest pain with acute onset of dyspnea and shortness of breath.  He denies ever having anything similar especially while he has been on dialysis in the past and ECG obtained at the dialysis unit showed SVT.  He was given approximately 600 mL of fluid; however, his SVT does not resolve and he was still in significant distress so EMS was notified and he was transferred to the hospital for further care and evaluation.  Upon arrival, he self converted en route as reported by EMS and was in sinus tachycardia upon arrival to the ED.  He has remained in sinus tachycardia throughout his stay so far and states that his chest pain and shortness of breath have improved as well.  He denies any recent change in his diet or new medications to his knowledge and otherwise denies any recent fevers, chills, nausea, vomiting, abdominal pain, melena, hematochezia, or hematemesis.  No change in his vision or headaches.      DAILY NEPHROLOGY SUMMARY:  2/28/17 - Consult done  3/01/17 - EDILSON, had more CP/palpitations overnight, K+ is up this AM  3/02/17 - EDILSON, tele shows no further tachycardia or SVT, asking about going home    Review of Systems   Constitutional: Negative.    HENT: Negative.    Eyes: Negative.    Respiratory: Negative.    Cardiovascular: Positive for chest pain and palpitations.   Gastrointestinal: Negative.     Musculoskeletal: Negative.    Skin: Negative.    Neurological: Negative.    Psychiatric/Behavioral: Negative.    All other systems reviewed and are negative.        Physical Exam   Constitutional: He is oriented to person, place, and time. He appears well-developed and well-nourished.   HENT:   Head: Normocephalic and atraumatic.   Eyes: Conjunctivae are normal. No scleral icterus.   Neck: Neck supple. No muscular tenderness present.   Cardiovascular: Regular rhythm and normal heart sounds.  Tachycardia present.    Pulmonary/Chest: Effort normal and breath sounds normal.   Abdominal: Soft. Bowel sounds are normal.   Musculoskeletal: He exhibits no edema or tenderness.   Neurological: He is alert and oriented to person, place, and time.   Skin: Skin is warm and dry.   Psychiatric: He has a normal mood and affect. His behavior is normal.   Nursing note and vitals reviewed.        PAST FAMILY HISTORY: Reviewed and Unchanged  SOCIAL HISTORY: Reviewed and Unchanged  CURRENT MEDICATIONS: Reviewed  LABORATORY RESULTS: Reviewed  IMAGING STUDIES: Reviewed      Fluids:         IMPRESSION:  - ESRD    * Etiology unknown, never had renal bx, likely 2/2 HTN  - SVT    * Etiology unclear, could be electrolytes induced vs new onset cardiomyopathy  - HTN  - Anemia of CKD  - Renal Osteodystrophy  - Cystic acne    ASSESSMENT:  - GFR: HD dependent  - Urine: oligoanuric  - BP: Goal < 140/90  - Volume: euvolemic  - Acid/Base: no sig. acid base disturbance  - Electrolytes: hyperkalemia no ECG changes  - Anemia: Goal Hgb 10-11  - MBD: Ca low likely due to calcitriol not being resumed, PO4 unknown  - HD Access: LAVF (+thrill/bruit)    PLAN:  - Continue TTS iHD during inpatient stay  - FU TTE  - UF as tolerated  - High Ca bath during dialysis as needed  - Calcitriol 0.5mcg po BID  - Tums 500mg QID  - Dose all meds per ESRD guidelines  - Will follow patient closely with you

## 2017-03-02 NOTE — PROGRESS NOTES
Hospital Medicine Interval Note  Date of Service:  3/2/2017    Chief Complaint  26 y.o.-year-old male admitted 2/28/2017 with tachycardia    Interval Problem Update  Hr now wnl  No recurrence of tachycardia    Patient was on abx outpatient for ? Bronchitis    bp is low normal    Patients respiratory symptoms have cleared  Echo shows ef 30%    Dr massey of cardiology called    tsh wnl    Consultants/Specialty  Renal,  cards    Disposition  home     Review of Systems   Constitutional: Negative for malaise/fatigue.   HENT: Negative for tinnitus.    Eyes: Negative for blurred vision, double vision, photophobia and pain.   Respiratory: Negative for cough, hemoptysis and sputum production.    Cardiovascular: Negative for chest pain, palpitations and orthopnea.   Gastrointestinal: Negative for heartburn, nausea and vomiting.   Genitourinary: Negative for dysuria, urgency and frequency.   Musculoskeletal: Negative for myalgias, back pain and neck pain.   Neurological: Negative for dizziness, tingling, tremors, sensory change, speech change and headaches.   All other systems reviewed and are negative.     Physical Exam Laboratory/Imaging   Filed Vitals:    03/02/17 0000 03/02/17 0400 03/02/17 0723 03/02/17 1238   BP: 115/76 110/65 101/60 109/80   Pulse: 76 55 96 65   Temp: 36.1 °C (97 °F) 36.3 °C (97.3 °F) 36.2 °C (97.2 °F) 36.3 °C (97.4 °F)   Resp: 16 17 20 20   Height:       Weight:       SpO2: 100% 98% 99% 100%     Physical Exam   Constitutional: He is oriented to person, place, and time. No distress.   Eyes: Left eye exhibits no discharge.   Neck: No JVD present. No tracheal deviation present. No thyromegaly present.   Cardiovascular: Normal rate.  Exam reveals no gallop and no friction rub.    No murmur heard.  Pulmonary/Chest: Effort normal and breath sounds normal. No respiratory distress. He has no wheezes. He has no rales.   Abdominal: Soft. Bowel sounds are normal. He exhibits no distension. There is no  tenderness. There is no rebound.   Musculoskeletal: He exhibits no edema or tenderness.   Neurological: He is alert and oriented to person, place, and time. No cranial nerve deficit. Coordination normal.   Skin: He is not diaphoretic.    Lab Results   Component Value Date/Time    WBC 6.0 03/02/2017 03:25 AM    HEMOGLOBIN 11.5* 03/02/2017 03:25 AM    HEMATOCRIT 36.0* 03/02/2017 03:25 AM    PLATELET COUNT 223 03/02/2017 03:25 AM     Lab Results   Component Value Date/Time    SODIUM 136 03/02/2017 03:25 AM    POTASSIUM 4.9 03/02/2017 03:25 AM    CHLORIDE 93* 03/02/2017 03:25 AM    CO2 31 03/02/2017 03:25 AM    GLUCOSE 93 03/02/2017 03:25 AM    BUN 23* 03/02/2017 03:25 AM    CREATININE 5.37* 03/02/2017 03:25 AM      Assessment/Plan    Hyperkalemia  Assessment & Plan  resolved    ESRD (end stage renal disease) (CMS-HCC)  Assessment & Plan  Dialysis as per renal    SVT (supraventricular tachycardia) (CMS-HCC)  Assessment & Plan  Due to fluid overload    Hypokalemia  Assessment & Plan  resolved    Upper respiratory tract infection  Assessment & Plan  mucinex    Sputum culture ordered    Cardiomyopathy due to hypertension, with heart failure (CMS-HCC) ef 30%  Assessment & Plan  Will defer to cards on which ace I they want to start    Decrease lopressor to 25mg po bid.( bp is low normal).. Consider transityion to coreg( defer to cards)    Dr massey of cardiology consulted         Denise catheter: No Denise      DVT Prophylaxis: Heparin

## 2017-03-03 VITALS
TEMPERATURE: 97.9 F | HEART RATE: 81 BPM | DIASTOLIC BLOOD PRESSURE: 69 MMHG | OXYGEN SATURATION: 98 % | SYSTOLIC BLOOD PRESSURE: 98 MMHG | WEIGHT: 124.78 LBS | BODY MASS INDEX: 18.91 KG/M2 | HEIGHT: 68 IN | RESPIRATION RATE: 17 BRPM

## 2017-03-03 DIAGNOSIS — I50.9 ACUTE ON CHRONIC CONGESTIVE HEART FAILURE, UNSPECIFIED CONGESTIVE HEART FAILURE TYPE: ICD-10-CM

## 2017-03-03 PROBLEM — E87.6 HYPOKALEMIA: Status: RESOLVED | Noted: 2017-03-01 | Resolved: 2017-03-03

## 2017-03-03 PROBLEM — I47.10 SVT (SUPRAVENTRICULAR TACHYCARDIA): Status: RESOLVED | Noted: 2017-02-28 | Resolved: 2017-03-03

## 2017-03-03 PROBLEM — J06.9 UPPER RESPIRATORY TRACT INFECTION: Status: RESOLVED | Noted: 2017-03-01 | Resolved: 2017-03-03

## 2017-03-03 PROCEDURE — A9270 NON-COVERED ITEM OR SERVICE: HCPCS | Performed by: INTERNAL MEDICINE

## 2017-03-03 PROCEDURE — A9270 NON-COVERED ITEM OR SERVICE: HCPCS | Performed by: HOSPITALIST

## 2017-03-03 PROCEDURE — 700112 HCHG RX REV CODE 229: Performed by: HOSPITALIST

## 2017-03-03 PROCEDURE — 700111 HCHG RX REV CODE 636 W/ 250 OVERRIDE (IP): Performed by: HOSPITALIST

## 2017-03-03 PROCEDURE — 99217 PR OBSERVATION CARE DISCHARGE: CPT | Performed by: HOSPITALIST

## 2017-03-03 PROCEDURE — 700102 HCHG RX REV CODE 250 W/ 637 OVERRIDE(OP): Performed by: HOSPITALIST

## 2017-03-03 PROCEDURE — G0378 HOSPITAL OBSERVATION PER HR: HCPCS

## 2017-03-03 PROCEDURE — 700102 HCHG RX REV CODE 250 W/ 637 OVERRIDE(OP): Performed by: INTERNAL MEDICINE

## 2017-03-03 RX ORDER — CARVEDILOL 3.12 MG/1
3.12 TABLET ORAL 2 TIMES DAILY WITH MEALS
Status: DISCONTINUED | OUTPATIENT
Start: 2017-03-03 | End: 2017-03-03 | Stop reason: HOSPADM

## 2017-03-03 RX ORDER — CARVEDILOL 3.12 MG/1
3.12 TABLET ORAL 2 TIMES DAILY WITH MEALS
Qty: 60 TAB | Refills: 3 | Status: SHIPPED | OUTPATIENT
Start: 2017-03-03 | End: 2017-03-17

## 2017-03-03 RX ORDER — LISINOPRIL 5 MG/1
5 TABLET ORAL DAILY
Qty: 30 TAB | Refills: 3 | Status: SHIPPED | OUTPATIENT
Start: 2017-03-03 | End: 2017-06-29 | Stop reason: SDUPTHER

## 2017-03-03 RX ORDER — CALCIUM CARBONATE 500 MG/1
1000 TABLET, CHEWABLE ORAL 3 TIMES DAILY
Status: DISCONTINUED | OUTPATIENT
Start: 2017-03-03 | End: 2017-03-03 | Stop reason: HOSPADM

## 2017-03-03 RX ORDER — CALCITRIOL 0.5 UG/1
0.5 CAPSULE, LIQUID FILLED ORAL 2 TIMES DAILY
Qty: 60 CAP | Refills: 3 | Status: SHIPPED | OUTPATIENT
Start: 2017-03-03 | End: 2017-11-28

## 2017-03-03 RX ORDER — LISINOPRIL 5 MG/1
5 TABLET ORAL
Status: DISCONTINUED | OUTPATIENT
Start: 2017-03-03 | End: 2017-03-03 | Stop reason: HOSPADM

## 2017-03-03 RX ADMIN — GUAIFENESIN 600 MG: 600 TABLET, EXTENDED RELEASE ORAL at 08:28

## 2017-03-03 RX ADMIN — METOPROLOL TARTRATE 25 MG: 25 TABLET, FILM COATED ORAL at 08:28

## 2017-03-03 RX ADMIN — LISINOPRIL 5 MG: 5 TABLET ORAL at 08:28

## 2017-03-03 RX ADMIN — ANTACID TABLETS 500 MG: 500 TABLET, CHEWABLE ORAL at 08:28

## 2017-03-03 RX ADMIN — CALCITRIOL 0.5 MCG: 0.5 CAPSULE, LIQUID FILLED ORAL at 08:29

## 2017-03-03 RX ADMIN — DOCUSATE SODIUM 100 MG: 100 CAPSULE ORAL at 08:28

## 2017-03-03 RX ADMIN — ANTACID TABLETS 1000 MG: 500 TABLET, CHEWABLE ORAL at 10:49

## 2017-03-03 RX ADMIN — HEPARIN SODIUM 5000 UNITS: 5000 INJECTION, SOLUTION INTRAVENOUS; SUBCUTANEOUS at 06:03

## 2017-03-03 ASSESSMENT — ENCOUNTER SYMPTOMS
MYALGIAS: 0
FEVER: 0
PALPITATIONS: 0
MUSCULOSKELETAL NEGATIVE: 1
CONSTITUTIONAL NEGATIVE: 1
HEADACHES: 0
DIZZINESS: 1
VOMITING: 0
NAUSEA: 0
CHILLS: 0
RESPIRATORY NEGATIVE: 1
PSYCHIATRIC NEGATIVE: 1
PALPITATIONS: 1
SHORTNESS OF BREATH: 0
NEUROLOGICAL NEGATIVE: 1
GASTROINTESTINAL NEGATIVE: 1
DIARRHEA: 0
EYES NEGATIVE: 1

## 2017-03-03 ASSESSMENT — PAIN SCALES - GENERAL: PAINLEVEL_OUTOF10: 0

## 2017-03-03 NOTE — PROGRESS NOTES
Cardiology Progress Note               Author: Li Canchola Date & Time created: 3/3/2017  9:38 AM     Interval History:  26 year old with HX of ESRD of unknown etiology, presented in the ED after developing a rapid regular rythem during dialysis.  Cardiology consulted for SVT (sefl converted) and new found EF of 30%    3/3:  114/72  HR 79      Pt states he feels dizzy and tired,  Worse with standing  No SVT    Transthoracic  Echo Report      Echocardiography Laboratory    CONCLUSIONS  Severely reduced left ventricular systolic function.  Normal left ventricular wall thickness.  Left ventricle is mildly dilated.  Aortic sclerosis without stenosis.  Moderate eccentric tricuspid regurgitation.  Moderate eccentric tricuspid regurgitation.  Right ventricular systolic pressure is estimated to be 35 mmHg.  Compared to the images of the study done 11/19/14 - there has been   worsening of left ventricular ejection fraction.     Review of Systems   Constitutional: Negative for fever, chills and malaise/fatigue.   Respiratory: Negative for shortness of breath.    Cardiovascular: Negative for chest pain and palpitations.   Gastrointestinal: Negative for nausea, vomiting and diarrhea.   Musculoskeletal: Negative for myalgias.   Skin: Negative for rash.   Neurological: Positive for dizziness. Negative for headaches.       Physical Exam   Constitutional: He is oriented to person, place, and time. He appears well-developed.   Thin frame     Eyes: Conjunctivae are normal.   Cardiovascular: Normal rate, regular rhythm and normal heart sounds.    Pulmonary/Chest: Effort normal and breath sounds normal. No respiratory distress. He has no wheezes.   Musculoskeletal: Normal range of motion. He exhibits no edema.   Neurological: He is alert and oriented to person, place, and time.   Skin: Skin is warm and dry.   Psychiatric: He has a normal mood and affect. His behavior is normal. Judgment and thought content normal.   Nursing note and  vitals reviewed.      Hemodynamics:  Temp (24hrs), Av.4 °C (97.6 °F), Min:36.1 °C (96.9 °F), Max:36.7 °C (98.1 °F)  Temperature: 36.1 °C (96.9 °F)  Pulse  Av.8  Min: 55  Max: 117  Blood Pressure: 114/72 mmHg     Respiratory:    Respiration: 16, Pulse Oximetry: 98 %        RUL Breath Sounds: Clear, RML Breath Sounds: Clear, RLL Breath Sounds: Clear, LEILA Breath Sounds: Clear, LLL Breath Sounds: Clear  Fluids:     Weight: 56.6 kg (124 lb 12.5 oz)  GI/Nutrition:  Orders Placed This Encounter   Procedures   • Diet Order     Standing Status: Standing      Number of Occurrences: 1      Standing Expiration Date:      Order Specific Question:  Diet:     Answer:  Renal [8]     Lab Results:  Recent Labs      17   1010  17   0213  17   0325   WBC  6.7  7.2  6.0   RBC  3.23*  3.49*  3.80*   HEMOGLOBIN  10.1*  10.5*  11.5*   HEMATOCRIT  30.5*  33.0*  36.0*   MCV  94.4  94.6  94.7   MCH  31.3  30.1  30.3   MCHC  33.1*  31.8*  31.9*   RDW  47.2  46.2  46.0   PLATELETCT  207  258  223   MPV  9.9  10.2  10.1     Recent Labs      17   1010  17   0213  17   0325   SODIUM  135  132*  136   POTASSIUM  3.1*  5.8*  4.9   CHLORIDE  97  96  93*   CO2  25  27  31   GLUCOSE  92  100*  93   BUN  36*  23*  23*   CREATININE  6.21*  5.31*  5.37*   CALCIUM  9.1  7.7*  7.7*     Recent Labs      17   1010   APTT  32.1   INR  1.05     Recent Labs      17   1010   BNPBTYPENAT  1075*     Recent Labs      17   1010   TROPONINI  0.06*   BNPBTYPENAT  1075*             Medical Decision Making, by Problem:  Active Hospital Problems    Diagnosis   • Cardiomyopathy due to hypertension, with heart failure (CMS-HCC) ef 30% [I11.0]   • Hypokalemia [E87.6]   • Upper respiratory tract infection [J06.9]   • SVT (supraventricular tachycardia) (CMS-[facility]) [I47.1]   • ESRD (end stage renal disease) (CMS-[facility]) [N18.6]   • Hyperkalemia [E87.5]       Plan:  Cardiomyopathy with EF of 30%:  Switch from lopressor to  coreg 3.125  Continue lisinopril 5mg  No signs of acute decompensation  Recheck echo in 1-3 months for EF evaluations    No lasix, pt on dialysis      Case and plan with Gretchen Delgado          Core Measures

## 2017-03-03 NOTE — DISCHARGE INSTRUCTIONS
Discharge Instructions    Discharged to home by car with relative. Discharged via wheelchair, hospital escort: Yes.  Special equipment needed: Not Applicable    Be sure to schedule a follow-up appointment with your primary care doctor or any specialists as instructed.     Discharge Plan:   Diet Plan: Discussed  Activity Level: Discussed  Confirmed Follow up Appointment: Appointment Scheduled  Confirmed Symptoms Management: Discussed  Medication Reconciliation Updated: Yes  Pneumococcal Vaccine Given - only chart on this line when given: Given (See MAR)  Influenza Vaccine Indication: Not indicated: Previously immunized this influenza season and > 8 years of age    I understand that a diet low in cholesterol, fat, and sodium is recommended for good health. Unless I have been given specific instructions below for another diet, I accept this instruction as my diet prescription.   Other diet: low sodium (less than 2 grams), low fat cardiac diet.    Special Instructions:   HF Patient Discharge Instructions  · Monitor your weight daily, and maintain a weight chart, to track your weight changes.   · Activity as tolerated, unless your Doctor has ordered otherwise. Other activity order  · Follow a low fat, low cholesterol, low salt diet unless instructed otherwise by your Doctor. Read the labels on the back of food products and track your intake of fat, cholesterol and salt.   · Fluid Restriction No. If a Fluid Restriction has been ordered by your Doctor, measure fluids with a measuring cup to ensure that you are not exceeding the restriction.   · No smoking.  · Oxygen No. If your Doctor has ordered that you wear Oxygen at home, it is important to wear it as ordered.  · Did you receive an explanation from staff on the importance of taking each of your medications and why it is necessary to keep taking them unless your doctor says to stop? Yes  · Were all of your questions answered about how to manage your heart failure and  what to do if you have increased signs and symptoms after you go home? Yes  · Do you feel like your heart failure care team involved you in the care treatment plan and allowed you to make decisions regarding your care while in the hospital and addressed any discharge needs you might have? Yes    See the educational handout provided at discharge for more information on monitoring your daily weight, activity and diet. This also explains more about Heart Failure, symptoms of a flare-up and some of the tests that you have undergone.     Warning Signs of a Flare-Up include:  · Swelling in the ankles or lower legs.  · Shortness of breath, while at rest, or while doing normal activities.   · Shortness of breath at night when in bed, or coughing in bed.   · Requiring more pillows to sleep at night, or needing to sit up at night to sleep.  · Feeling weak, dizzy or fatigued.     When to call your Doctor:  · Call [address] floor about questions regarding the discharge instructions you were given [phone].  · Call your Primary Care Physician or Cardiologist if:   1. You experience any pain radiating to your jaw or neck.  2. You have any difficulty breathing.  3. You experience weight gain of 3 lbs in a day or 5 lbs in a week.   4. You feel any palpitations or irregular heartbeats.  5. You become dizzy or lose consciousness.   If you have had an angiogram or had a pacemaker or AICD placed, and experience:  1. Bleeding, drainage or swelling at the surgical / puncture site.  2. Fever greater than 100.0 F  3. Shock from internal defibrillator.  4. Cool and / or numb extremities.      · Is patient discharged on Warfarin / Coumadin?   No     · Is patient Post Blood Transfusion?  No    Depression / Suicide Risk    As you are discharged from this [facility], it is important to learn how to keep safe from harming yourself.    Recognize the warning signs:  · Abrupt changes in personality, positive or  negative- including increase in energy   · Giving away possessions  · Change in eating patterns- significant weight changes-  positive or negative  · Change in sleeping patterns- unable to sleep or sleeping all the time   · Unwillingness or inability to communicate  · Depression  · Unusual sadness, discouragement and loneliness  · Talk of wanting to die  · Neglect of personal appearance   · Rebelliousness- reckless behavior  · Withdrawal from people/activities they love  · Confusion- inability to concentrate     If you or a loved one observes any of these behaviors or has concerns about self-harm, here's what you can do:  · Talk about it- your feelings and reasons for harming yourself  · Remove any means that you might use to hurt yourself (examples: pills, rope, extension cords, firearm)  · Get professional help from the community (Mental Health, Substance Abuse, psychological counseling)  · Do not be alone:Call your Safe Contact- someone whom you trust who will be there for you.  · Call your local CRISIS HOTLINE 767-8508 or 110-659-9355  · Call your local Children's Mobile Crisis Response Team [place] (200) 312-3924 or wwwTelekenex  · Call the toll free National Suicide Prevention Hotlines   · National Suicide Prevention Lifeline 331-083-PTTL (7021)  · National Hope Line Network 800-SUICIDE (185-3627)    Lisinopril tablets  What is this medicine?  LISINOPRIL (lyse IN oh pril) is an ACE inhibitor. This medicine is used to treat high blood pressure and heart failure. It is also used to protect the heart immediately after a heart attack.  This medicine may be used for other purposes; ask your health care provider or pharmacist if you have questions.  COMMON BRAND NAME(S): Prinivil, Zestril  What should I tell my health care provider before I take this medicine?  They need to know if you have any of these conditions:  -diabetes  -heart or blood vessel disease  -immune system disease like lupus or  scleroderma  -kidney disease  -low blood pressure  -previous swelling of the tongue, face, or lips with difficulty breathing, difficulty swallowing, hoarseness, or tightening of the throat  -an unusual or allergic reaction to lisinopril, other ACE inhibitors, insect venom, foods, dyes, or preservatives  -pregnant or trying to get pregnant  -breast-feeding  How should I use this medicine?  Take this medicine by mouth with a glass of water. Follow the directions on your prescription label. You may take this medicine with or without food. Take your medicine at regular intervals. Do not stop taking this medicine except on the advice of your doctor or health care professional.  Talk to your pediatrician regarding the use of this medicine in children. Special care may be needed. While this drug may be prescribed for children as young as 6 years of age for selected conditions, precautions do apply.  Overdosage: If you think you have taken too much of this medicine contact a poison control center or emergency room at once.  NOTE: This medicine is only for you. Do not share this medicine with others.  What if I miss a dose?  If you miss a dose, take it as soon as you can. If it is almost time for your next dose, take only that dose. Do not take double or extra doses.  What may interact with this medicine?  -diuretics  -lithium  -NSAIDs, medicines for pain and inflammation, like ibuprofen or naproxen  -over-the-counter herbal supplements like hawthorn  -potassium salts or potassium supplements  -salt substitutes  This list may not describe all possible interactions. Give your health care provider a list of all the medicines, herbs, non-prescription drugs, or dietary supplements you use. Also tell them if you smoke, drink alcohol, or use illegal drugs. Some items may interact with your medicine.  What should I watch for while using this medicine?  Visit your doctor or health care professional for regular check ups. Check your  blood pressure as directed. Ask your doctor what your blood pressure should be, and when you should contact him or her. Call your doctor or health care professional if you notice an irregular or fast heart beat.  Women should inform their doctor if they wish to become pregnant or think they might be pregnant. There is a potential for serious side effects to an unborn child. Talk to your health care professional or pharmacist for more information.  Check with your doctor or health care professional if you get an attack of severe diarrhea, nausea and vomiting, or if you sweat a lot. The loss of too much body fluid can make it dangerous for you to take this medicine.  You may get drowsy or dizzy. Do not drive, use machinery, or do anything that needs mental alertness until you know how this drug affects you. Do not stand or sit up quickly, especially if you are an older patient. This reduces the risk of dizzy or fainting spells. Alcohol can make you more drowsy and dizzy. Avoid alcoholic drinks.  Avoid salt substitutes unless you are told otherwise by your doctor or health care professional.  Do not treat yourself for coughs, colds, or pain while you are taking this medicine without asking your doctor or health care professional for advice. Some ingredients may increase your blood pressure.  What side effects may I notice from receiving this medicine?  Side effects that you should report to your doctor or health care professional as soon as possible:  -abdominal pain with or without nausea or vomiting  -allergic reactions like skin rash or hives, swelling of the hands, feet, face, lips, throat, or tongue  -dark urine  -difficulty breathing  -dizzy, lightheaded or fainting spell  -fever or sore throat  -irregular heart beat, chest pain  -pain or difficulty passing urine  -redness, blistering, peeling or loosening of the skin, including inside the mouth  -unusually weak  -yellowing of the eyes or skin  Side effects that  usually do not require medical attention (report to your doctor or health care professional if they continue or are bothersome):  -change in taste  -cough  -decreased sexual function or desire  -headache  -sun sensitivity  -tiredness  This list may not describe all possible side effects. Call your doctor for medical advice about side effects. You may report side effects to FDA at 9-613-FDA-2125.  Where should I keep my medicine?  Keep out of the reach of children.  Store at room temperature between 15 and 30 degrees C (59 and 86 degrees F). Protect from moisture. Keep container tightly closed. Throw away any unused medicine after the expiration date.  NOTE: This sheet is a summary. It may not cover all possible information. If you have questions about this medicine, talk to your doctor, pharmacist, or health care provider.  © 2014, [employer]. (6/22/2009 5:36:32 PM)    Carvedilol tablets  What is this medicine?  CARVEDILOL (DIVINA ve dil ol) is a beta-blocker. Beta-blockers reduce the workload on the heart and help it to beat more regularly. This medicine is used to treat high blood pressure and heart failure.  This medicine may be used for other purposes; ask your health care provider or pharmacist if you have questions.  COMMON BRAND NAME(S): Coreg  What should I tell my health care provider before I take this medicine?  They need to know if you have any of these conditions:  -circulation problems  -diabetes  -history of heart attack or heart disease  -liver disease  -lung or breathing disease, like asthma or emphysema  -pheochromocytoma  -slow or irregular heartbeat  -thyroid disease  -an unusual or allergic reaction to carvedilol, other beta-blockers, medicines, foods, dyes, or preservatives  -pregnant or trying to get pregnant  -breast-feeding  How should I use this medicine?  Take this medicine by mouth with a glass of water. Follow the directions on the prescription label. It is best to take the  tablets with food. Take your doses at regular intervals. Do not take your medicine more often than directed. Do not stop taking except on the advice of your doctor or health care professional.  Talk to your pediatrician regarding the use of this medicine in children. Special care may be needed.  Overdosage: If you think you have taken too much of this medicine contact a poison control center or emergency room at once.  NOTE: This medicine is only for you. Do not share this medicine with others.  What if I miss a dose?  If you miss a dose, take it as soon as you can. If it is almost time for your next dose, take only that dose. Do not take double or extra doses.  What may interact with this medicine?  Do not take this medicine with any of the following medications:  -sotalol  This medicine may also interact with the following medications:  -cimetidine  -clonidine  -cyclosporine  -digoxin  -MAOIs like Carbex, Eldepryl, Marplan, Nardil, and Parnate  -medicines for blood pressure, heart disease, irregular heart beat  -medicines for depression like fluoxetine and paroxetine  -medicines for diabetes  -medicines to control heart rhythm like propafenone and quinidine  -reserpine  -rifampin  This list may not describe all possible interactions. Give your health care provider a list of all the medicines, herbs, non-prescription drugs, or dietary supplements you use. Also tell them if you smoke, drink alcohol, or use illegal drugs. Some items may interact with your medicine.  What should I watch for while using this medicine?  Check your heart rate and blood pressure regularly while you are taking this medicine. Ask your doctor or health care professional what your heart rate and blood pressure should be, and when you should contact him or her. Do not stop taking this medicine suddenly. This could lead to serious heart-related effects.  Contact your doctor or health care professional if you have difficulty breathing while  taking this drug.  Check your weight daily. Ask your doctor or health care professional when you should notify him/her of any weight gain.  You may get drowsy or dizzy. Do not drive, use machinery, or do anything that requires mental alertness until you know how this medicine affects you. To reduce the risk of dizzy or fainting spells, do not sit or stand up quickly. Alcohol can make you more drowsy, and increase flushing and rapid heartbeats. Avoid alcoholic drinks.  If you have diabetes, check your blood sugar as directed. Tell your doctor if you have changes in your blood sugar while you are taking this medicine.  If you are going to have surgery, tell your doctor or health care professional that you are taking this medicine.  What side effects may I notice from receiving this medicine?  Side effects that you should report to your doctor or health care professional as soon as possible:  -allergic reactions like skin rash, itching or hives, swelling of the face, lips, or tongue  -breathing problems  -dark urine  -irregular heartbeat  -swollen legs or ankles  -vomiting  -yellowing of the eyes or skin  Side effects that usually do not require medical attention (report to your doctor or health care professional if they continue or are bothersome):  -change in sex drive or performance  -diarrhea  -dry eyes (especially if wearing contact lenses)  -dry, itching skin  -headache  -nausea  -unusually tired  This list may not describe all possible side effects. Call your doctor for medical advice about side effects. You may report side effects to FDA at 3-030-FDA-7788.  Where should I keep my medicine?  Keep out of the reach of children.  Store at room temperature below 30 degrees C (86 degrees F). Protect from moisture. Keep container tightly closed. Throw away any unused medicine after the expiration date.  NOTE: This sheet is a summary. It may not cover all possible information. If you have questions about this medicine,  talk to your doctor, pharmacist, or health care provider.  © 2014, [employer] Standard. (3/12/2009 2:39:22 PM)    Paroxysmal Supraventricular Tachycardia  Paroxysmal supraventricular tachycardia (PSVT) is when your heart beats very quickly and then suddenly stops beating so quickly. You may or may not have any symptoms when this occurs. It is usually not dangerous. It can lead to problems if it happens often or it lasts a long time.  HOME CARE   · Take medicines only as told by your doctor.  · Avoid caffeine or limit how much of it you consume as told by your doctor. Caffeine is found in coffee, tea, soda, and chocolate.  · Avoid alcohol or limit how much of it you drink as told by your doctor.  · Do not smoke.  · Try to get at least 7 hours of sleep each night.  · Find healthy ways to reduce stress.  · Do self-treatments as told by your doctor to slow down your heart (vagus nerve stimulation). Your doctor may tell you to:  ¨ Hold your breath and push, as though you are going to the bathroom.  ¨ Rub an area on one side of your neck.  ¨ Bend forward with your head between your legs.  ¨ Bend forward with your head between your legs, then cough.  ¨ Rub your eyeballs with your eyes closed.  · Maintain a healthy weight.  · Get some exercise on most days. Ask your doctor about some good activities for you.  GET HELP IF:  · You are having episodes of a fast heartbeat more often.  · Your episodes are lasting longer.  · Your self-treatments to slow down your heart are no longer helping.  · You have new symptoms during an episode.  GET HELP RIGHT AWAY IF:  · You have chest pain.  · You have trouble breathing.  · You have an episode of a fast heartbeat that lasts longer than 20 minutes.  · You pass out (faint).  These symptoms may be an emergency. Do not wait to see if the symptoms will go away. Get medical help right away. Call your local emergency services (911 in the [place] Do not drive yourself to the hospital.     This  information is not intended to replace advice given to you by your health care provider. Make sure you discuss any questions you have with your health care provider.     Document Released: 12/18/2006 Document Revised: 01/08/2016 Document Reviewed: 05/28/2015  [employer] Interactive Patient Education ©2016 [employer]

## 2017-03-03 NOTE — CARE PLAN
Problem: Communication  Goal: The ability to communicate needs accurately and effectively will improve  Outcome: PROGRESSING AS EXPECTED  POC discussed with pt. All medications explained. All questions answered.     Problem: Mobility  Goal: Risk for activity intolerance will decrease  Outcome: PROGRESSING AS EXPECTED  Pt is ambulatory and up ad trena.

## 2017-03-03 NOTE — PROGRESS NOTES
Received report from nightshift RN, assumed care of patient. Patient is A&O x 4, declines use of bed alarm at this time despite education related to safety and fall prevention. Patient educated on importance of calling if in need of assistance. Verbalizes understanding. Patient declines pain at this time. Patient updated on plan of care, voices no concerns at this time. Will continue to monitor for safety and comfort.

## 2017-03-03 NOTE — PROGRESS NOTES
[facility] -  PROGRESS NOTE               Author: Gina Aparicio M.D. Date & Time: 3/3/2017  10:04 AM     HPI:  A 26-year-old  male with PMH of ESRD TTS iHD, hypertension, anemia of CKD, renal osteodystrophy, and cystic acne who presented to the hospital from the dialysis unit with a chief complaint as described above.  He states that he was at dialysis where he was usually scheduled for a session.  He is fairly compliant and has not missed any recently and that approximately hour and a half into his treatment, he was noted to have tachycardia, heart rate in the 120s-130s and chest pain with acute onset of dyspnea and shortness of breath.  He denies ever having anything similar especially while he has been on dialysis in the past and ECG obtained at the dialysis unit showed SVT.  He was given approximately 600 mL of fluid; however, his SVT does not resolve and he was still in significant distress so EMS was notified and he was transferred to the hospital for further care and evaluation.  Upon arrival, he self converted en route as reported by EMS and was in sinus tachycardia upon arrival to the ED.  He has remained in sinus tachycardia throughout his stay so far and states that his chest pain and shortness of breath have improved as well.  He denies any recent change in his diet or new medications to his knowledge and otherwise denies any recent fevers, chills, nausea, vomiting, abdominal pain, melena, hematochezia, or hematemesis.  No change in his vision or headaches.      DAILY NEPHROLOGY SUMMARY:  2/28/17 - Consult done  3/01/17 - EDILSON, had more CP/palpitations overnight, K+ is up this AM  3/02/17 - EDILSON, tele shows no further tachycardia or SVT, asking about going home  3/03/17 - EDILSON, EF 30% on TTE yesterday; Cards consulted and started on HF meds    Review of Systems   Constitutional: Negative.    HENT: Negative.    Eyes: Negative.    Respiratory: Negative.     Cardiovascular: Positive for chest pain and palpitations.   Gastrointestinal: Negative.    Musculoskeletal: Negative.    Skin: Negative.    Neurological: Negative.    Psychiatric/Behavioral: Negative.    All other systems reviewed and are negative.      Physical Exam   Constitutional: He is oriented to person, place, and time. He appears well-developed and well-nourished.   HENT:   Head: Normocephalic and atraumatic.   Eyes: Conjunctivae are normal. No scleral icterus.   Neck: Neck supple. No muscular tenderness present.   Cardiovascular: Regular rhythm and normal heart sounds.  Tachycardia present.    Pulmonary/Chest: Effort normal and breath sounds normal.   Abdominal: Soft. Bowel sounds are normal.   Musculoskeletal: He exhibits no edema or tenderness.   Neurological: He is alert and oriented to person, place, and time.   Skin: Skin is warm and dry.   Psychiatric: He has a normal mood and affect. His behavior is normal.   Nursing note and vitals reviewed.      PAST FAMILY HISTORY: Reviewed and Unchanged  SOCIAL HISTORY: Reviewed and Unchanged  CURRENT MEDICATIONS: Reviewed  LABORATORY RESULTS: Reviewed  IMAGING STUDIES: Reviewed      Fluids:         IMPRESSION:  - ESRD    * Etiology unknown, never had renal bx, likely 2/2 HTN  - SVT    * Etiology unclear, could be electrolytes induced vs new onset cardiomyopathy  - HTN  - Anemia of CKD  - Renal Osteodystrophy  - New onset systolic HF, EF 30%  - Cystic acne    ASSESSMENT:  - GFR: HD dependent  - Urine: oligoanuric  - BP: Goal < 140/90  - Volume: euvolemic  - Acid/Base: no sig. acid base disturbance  - Electrolytes: stable  - Anemia: Goal Hgb 10-11  - MBD: Ca low, PO4 unknown  - HD Access: LAVF (+thrill/bruit)    PLAN:  - Continue TTS iHD during inpatient stay  - UF as tolerated  - 3Ca bath during iHD  - PO4 in AM  - Ok for ACEI as long as K+ does not become an issue  - Calcitriol 0.5mcg po BID  - Tums 1000mg TID  - Dose all meds per ESRD guidelines  - Will  follow patient closely with you

## 2017-03-03 NOTE — CONSULTS
DATE OF SERVICE:  03/02/2017    REQUESTING PHYSICIAN:  Abdiel Cole M.D.    INDICATION:  Heart failure.    HISTORY OF PRESENT ILLNESS:  The patient is a pleasant 26-year-old gentleman   who is with [facility] for many years and struggled with his end-stage renal   disease.  He has been on dialysis for more than 10 years.  He states that he   has been somewhat discouraged because he has no family in the area and   although he is compliant with following his doctor, he has not been   able to be on the transplant list for kidney because he does not have adequate   .    In any event, he is compliant with his medical regimen had undergoing dialysis   as he was scheduled.  He came to the emergency room in the morning on the   28th.  He was at dialysis when he went into rapid regular rhythm and was   hypertensive and short of breath.  In the emergency vehicle on the way here,   he converted back into sinus rhythm.  He stated that he had some fevers and   cough for about a month ago, but was feeling better.    In the emergency room, he was seem to be stable hemodynamically, although he   had mild sinus tachycardia.  He has been recovering quite nicely during the   course of his hospital stay and undergoing dialysis as usual.  He was placed   on a beta blocker and there was no recurrent arrhythmia.    He underwent an uneventful echocardiogram today that showed that his ejection   fraction has declined.  All other review of systems is negative.    PAST MEDICAL HISTORY:  1.  End-stage renal disease, on long-term dialysis, etiology is unknown,   oligoanuric.  A.  Fistula in the left arm.  2.  History of anxiety and depression.    The patient is a Jain and does not accept blood products.    ALLERGIES:  HE IS ALSO ALLERGIC TO VANCOMYCIN.    SOCIAL HISTORY AND FAMILY HISTORY:  Unremarkable for kidney disease   prematurely, hypertension, premature coronary disease, or sudden cardiac   death.  He does not  smoke, drink, or use illicit drugs.    PHYSICAL EXAMINATION:  VITAL SIGNS:  Blood pressure currently is 107/65, heart rate is in the 80s and   sinus in mechanism.  GENERAL:  A slender-appearing  young man in no acute distress, sitting   cross legged in bed.  NEUROLOGICAL:  Cranial nerves are grossly intact bilaterally II-XII.  No   nystagmus or tremor.  NECK:  Carotid upstrokes are diminished.  There is no visible jugular venous   distention at 90 degrees.  HEART:  Precordium is nontender.  There are no audible murmurs or diastolic   sounds.  LUNGS:  Breath sounds are diminished without crackles.  ABDOMEN:  Scaphoid and soft.  EXTREMITIES:  Cool and dry.  There is no significant pitting edema.  Fistula   has a good thrill and is quite varicose in his left brachial artery.    RADIOGRAPHIC STUDIES:  A 12-lead ECG done in the emergency room and reviewed   by me shows sinus tachycardia with left ventricular hypertrophy, no ischemic   changes.  Intervals are normal, unchanged the next day.  Echocardiogram, I   looked at the images prior to seeing the patient.  He has global left   ventricular dysfunction with estimated ejection fraction of 30%.  This is   decreased from his prior echo 3 years ago, mitral valve is normal in   structure, no significant valvular regurgitation, estimated right atrial   pressure is normal, pulmonary pressure is 35.    LABORATORY STUDIES:  Potassium this morning was 4.9, creatinine 5.4, albumin   on admission was 3.9, brain natriuretic on admission was 1075.  Troponin is   0.06.  White count is 6, hemoglobin 11.5 with the normal MCV, and platelet   count 223.    Chest x-ray done at this admission shows moderate bilateral infiltrates   without significant pleural effusion.    ASSESSMENT AND RECOMMENDATIONS:  The patient is a 26-year-old gentleman with   longstanding chronic kidney disease, recent respiratory infection, and   transient supraventricular tachycardia.  The differential of  his newly   diagnosed systolic heart failure is likely acute in the setting of upper   respiratory infection and supraventricular tachycardia.  I think also in the   differential, he may well have premature coronary artery disease, this is much   less likely.  1.  We will start heart failure medications including a nonspecific beta   blocker, low dose angiotensin-converting enzyme inhibitor as well as aspirin.  2.  He will not be a candidate for diuretics as he is currently anuric on   dialysis.  We would like to check with an echo to see if he has had   improvement.    Thank you kindly, we will follow him along with you.       ____________________________________     MD GRANT GUERRA / CRISTINA    DD:  03/02/2017 14:43:06  DT:  03/02/2017 18:46:03    D#:  [id number]  Job#:  [id number]

## 2017-03-04 ENCOUNTER — PATIENT OUTREACH (OUTPATIENT)
Dept: HEALTH INFORMATION MANAGEMENT | Facility: OTHER | Age: 27
End: 2017-03-04

## 2017-03-05 LAB
BACTERIA BLD CULT: NORMAL
BACTERIA BLD CULT: NORMAL
SIGNIFICANT IND 70042: NORMAL
SIGNIFICANT IND 70042: NORMAL
SITE SITE: NORMAL
SITE SITE: NORMAL
SOURCE SOURCE: NORMAL
SOURCE SOURCE: NORMAL

## 2017-03-05 NOTE — DISCHARGE SUMMARY
CONSULTATIONS:  Dr. Aparicio of nephrology, Dr. Cantu of cardiology.    A 26-year-old male who had some SVT during dialysis, came to the hospital and   still having evidence of sinus tachycardia.  He was being treated for acute   bronchitis, but when he was seen by [facility], it sounded   more like a viral upper respiratory tract infection.  The antibiotics were   discontinued.  The patient received routine dialysis and with a little excess   fluid, patient's tachycardia did improve.  He had an echocardiogram that   showed a reduced EF of 30%.  We consulted cardiology.  They recommended low   dose beta blocker and low dose ACE inhibitor.  Patient was stable for   discharge on 3/3/2017 with the below medications:  Lisinopril 5 mg daily,   Coreg 3.125 mg b.i.d.  The patient can continue his calcium carbonate 4000 mg   every 6 hours, calcitriol 0.5 b.i.d.  Patient will follow up with routine   dialysis as per nephrology.    PERTINENT LABORATORY DATA:  White cell count of 6, hemoglobin 11, hematocrit   36, platelet count is 223.  Echocardiogram shows EF of 30%.  X-ray of the   chest shows mild hazy opacities, interstitial prominence, chronic changes   versus fluid overload.    IMPRESSION:  1.  Supraventricular tachycardia.  2.  Suspected viral upper respiratory infection, resolved.  3. _____ treated.  4.  Fluid overload.  5.  Acute-on-chronic renal failure.  6.  End-stage renal disease, requiring dialysis.  7.  Cardiomyopathy with ejection fraction of 30%.  The patient was discharged   in stable condition.    On the day of discharge, 35 minutes taken with this patient.       ____________________________________     MD SKY VELIZ / CRISTINA    DD:  03/04/2017 08:38:09  DT:  03/05/2017 04:15:37    D#:  [id number]  Job#:  [id number]

## 2017-03-06 ENCOUNTER — PATIENT OUTREACH (OUTPATIENT)
Dept: HEALTH INFORMATION MANAGEMENT | Facility: OTHER | Age: 27
End: 2017-03-06

## 2017-03-07 ENCOUNTER — OFFICE VISIT (OUTPATIENT)
Dept: MEDICAL GROUP | Facility: CLINIC | Age: 27
End: 2017-03-07
Payer: COMMERCIAL

## 2017-03-07 ENCOUNTER — TELEPHONE (OUTPATIENT)
Dept: MEDICAL GROUP | Facility: CLINIC | Age: 27
End: 2017-03-07

## 2017-03-07 VITALS
OXYGEN SATURATION: 100 % | TEMPERATURE: 96.4 F | HEART RATE: 78 BPM | DIASTOLIC BLOOD PRESSURE: 68 MMHG | BODY MASS INDEX: 20.56 KG/M2 | SYSTOLIC BLOOD PRESSURE: 142 MMHG | HEIGHT: 67 IN | RESPIRATION RATE: 16 BRPM | WEIGHT: 131 LBS

## 2017-03-07 DIAGNOSIS — Z09 HOSPITAL DISCHARGE FOLLOW-UP: ICD-10-CM

## 2017-03-07 DIAGNOSIS — I50.20 SYSTOLIC CONGESTIVE HEART FAILURE, UNSPECIFIED CONGESTIVE HEART FAILURE CHRONICITY: ICD-10-CM

## 2017-03-07 DIAGNOSIS — Z99.2 DIALYSIS PATIENT (HCC): ICD-10-CM

## 2017-03-07 DIAGNOSIS — E83.51 HYPOCALCEMIA: ICD-10-CM

## 2017-03-07 PROCEDURE — 99214 OFFICE O/P EST MOD 30 MIN: CPT | Performed by: NURSE PRACTITIONER

## 2017-03-07 ASSESSMENT — ENCOUNTER SYMPTOMS
MYALGIAS: 0
NAUSEA: 0
WEAKNESS: 0
HEARTBURN: 0
FALLS: 0
DIZZINESS: 0
SPUTUM PRODUCTION: 0
DEPRESSION: 0
SHORTNESS OF BREATH: 0
HEADACHES: 0
EYE PAIN: 0
PALPITATIONS: 0
WHEEZING: 0
COUGH: 0
NERVOUS/ANXIOUS: 0
FEVER: 0
CHILLS: 0
BLURRED VISION: 0
VOMITING: 0
FOCAL WEAKNESS: 0
ABDOMINAL PAIN: 0
SORE THROAT: 0

## 2017-03-07 ASSESSMENT — PATIENT HEALTH QUESTIONNAIRE - PHQ9: CLINICAL INTERPRETATION OF PHQ2 SCORE: 0

## 2017-03-07 NOTE — MR AVS SNAPSHOT
"        Otoniel Wing   3/7/2017 8:40 AM   Office Visit   MRN: [id number]    Department:  [facility]   Dept Phone:  [phone]    Description:  Male : 1990   Provider:  DOMINIQUE Sanchez           Reason for Visit     Hospital Follow-up           Allergies as of 3/7/2017     Allergen Noted Reactions    Bloodless 2014       Vancomycin 10/26/2015   Itching    Pt states itching.      You were diagnosed with     Hospital discharge follow-up   [530586]       Systolic congestive heart failure, unspecified congestive heart failure chronicity (CMS-HCC)   [8301025]       Dialysis patient (CMS-HCC)   [272162]       Hypocalcemia   [275.41.ICD-9-CM]         Vital Signs     Blood Pressure Pulse Temperature Respirations Height Weight    142/68 mmHg 78 35.8 °C (96.4 °F) 16 1.702 m (5' 7\") 59.421 kg (131 lb)    Body Mass Index Oxygen Saturation Smoking Status             20.51 kg/m2 100% Former Smoker         Basic Information     Date Of Birth Sex Race Ethnicity Preferred Language    1990 Male  or   Origin ([place],Libyan,Guatemalan,Cymraes, etc) English      Your appointments     Mar 17, 2017  3:00 PM   Heart Failure New with Kimmy Godinez M.D.   [facility] (--)    [address], [address]  [place] [address]   [phone]            2017  9:00 AM   New Patient with MAITE AlvaradoPKAITLIN   [facility] [address]    [address]  [address]  [place] [address] [phone]           Please bring Photo ID, Insurance Cards, All Medication Bottles and copies of any legal documents (such as Living Will, Power of ) If speaking a language besides English please bring an adult . Please arrive 30 minutes prior for check in and registration. You will be receiving a confirmation call a few days before your appointment from our automated call confirmation system.              "   Problem List              ICD-10-CM Priority Class Noted - Resolved    Cystic acne L70.0   8/12/2009 - Present    Renal failure N19   8/17/2009 - Present    Dialysis patient (CMS-HCC) Z99.2   1/25/2012 - Present    HTN (hypertension) I10   1/25/2012 - Present    ESRD (end stage renal disease) (CMS-HCC) N18.6   10/28/2013 - Present    CHF (congestive heart failure) (CMS-HCC) I50.9   1/27/2014 - Present    Pulmonary edema J81.1   1/28/2014 - Present    Anemia of chronic renal failure N18.9, D63.1   1/28/2014 - Present    Chest pain R07.9 High  11/18/2014 - Present    End stage renal disease (CMS-HCC) N18.6   12/22/2014 - Present    Depressive disorder F32.9   12/28/2016 - Present    Cardiomyopathy due to hypertension, with heart failure (CMS-HCC) ef 30% I11.0   3/2/2017 - Present      Health Maintenance        Date Due Completion Dates    IMM DTaP/Tdap/Td Vaccine (1 - Tdap) 5/23/2007 ---    IMM PNEUMOCOCCAL 19-64 (ADULT) HIGHEST RISK SERIES (3 of 3 - PPSV23) 4/26/2017 3/1/2017, 11/11/2009            Current Immunizations     13-VALENT PCV PREVNAR 3/1/2017  5:45 AM    Influenza TIV (IM) 10/2/2016, 10/9/2015, 10/18/2014, 11/13/2013    Pneumococcal polysaccharide vaccine (PPSV-23) 11/11/2009 11:15 AM      Below and/or attached are the medications your provider expects you to take. Review all of your home medications and newly ordered medications with your provider and/or pharmacist. Follow medication instructions as directed by your provider and/or pharmacist. Please keep your medication list with you and share with your provider. Update the information when medications are discontinued, doses are changed, or new medications (including over-the-counter products) are added; and carry medication information at all times in the event of emergency situations     Allergies:  BLOODLESS - (reactions not documented)     VANCOMYCIN - Itching               Medications  Valid as of: March 07, 2017 - 10:15 AM    Generic Name Brand  Name Tablet Size Instructions for use    Benzonatate (Cap) TESSALON 100 MG Take 2 Caps by mouth 2 times a day as needed for Cough.        Calcitriol (Cap) ROCALTROL 0.5 MCG Take 1 Cap by mouth 2 Times a Day.        Calcium Carbonate Antacid (Chew Tab) Calcium Carbonate Antacid 1000 MG Take 4,000 mg by mouth every 6 hours.        Carvedilol (Tab) COREG 3.125 MG Take 1 Tab by mouth 2 times a day, with meals.        Lisinopril (Tab) PRINIVIL 5 MG Take 1 Tab by mouth every day.        .                 Medicines prescribed today were sent to:     [facility] [address] [place] - [address]    [facility] [place] [address]    Phone: [phone] Fax: [phone]    Open 24 Hours?: No      Medication refill instructions:       If your prescription bottle indicates you have medication refills left, it is not necessary to call your provider’s office. Please contact your pharmacy and they will refill your medication.    If your prescription bottle indicates you do not have any refills left, you may request refills at any time through one of the following ways: The online [employer] system (except Urgent Care), by calling your provider’s office, or by asking your pharmacy to contact your provider’s office with a refill request. Medication refills are processed only during regular business hours and may not be available until the next business day. Your provider may request additional information or to have a follow-up visit with you prior to refilling your medication.   *Please Note: Medication refills are assigned a new Rx number when refilled electronically. Your pharmacy may indicate that no refills were authorized even though a new prescription for the same medication is available at the pharmacy. Please request the medicine by name with the pharmacy before contacting your provider for a refill.        Your To Do List     Future Labs/Procedures Complete By Expires    COMP METABOLIC PANEL  As directed  3/7/2018    PHOSPHORUS  As directed 3/7/2018      Instructions    If you need further assistance, or have any questions; concerns or lingering symptoms before seeing your Primary Care Provider or specialist.     Do not hesitate to contact us.     Please contact us at the Post-Hospital Follow Up Program at [phone].   Our offices hours are Monday-Friday 8 am-5 pm.                FuturestateIT Access Code: [email or web address]  Expires: 3/14/2017 10:19 AM    FuturestateIT  A secure, online tool to manage your health information     [employer]’s FuturestateIT® is a secure, online tool that connects you to your personalized health information from the privacy of your home -- day or night - making it very easy for you to manage your healthcare. Once the activation process is completed, you can even access your medical information using the FuturestateIT joshua, which is available for free in the Apple Joshua store or Google Play store.     FuturestateIT provides the following levels of access (as shown below):   My Chart Features   [facility] Primary [facility] Doctor [facility]  Specialists [facility]  Urgent  Care [facility]  Primary Care  Doctor   Email your healthcare team securely and privately 24/7 X X X    Manage appointments: schedule your next appointment; view details of past/upcoming appointments X      Request prescription refills. X      View recent personal medical records, including lab and immunizations X X X X   View health record, including health history, allergies, medications X X X X   Read reports about your outpatient visits, procedures, consult and ER notes X X X X   See your discharge summary, which is a recap of your hospital and/or ER visit that includes your diagnosis, lab results, and care plan. X X       How to register for FuturestateIT:  1. Go to  [email or web address].  2. Click on the Sign Up Now box, which takes you to the New Member Sign Up page. You will need to provide the following information:  a. Enter your FuturestateIT Access Code  exactly as it appears at the top of this page. (You will not need to use this code after you’ve completed the sign-up process. If you do not sign up before the expiration date, you must request a new code.)   b. Enter your date of birth.   c. Enter your home email address.   d. Click Submit, and follow the next screen’s instructions.  3. Create a [employer] ID. This will be your [employer] login ID and cannot be changed, so think of one that is secure and easy to remember.  4. Create a [employer] password. You can change your password at any time.  5. Enter your Password Reset Question and Answer. This can be used at a later time if you forget your password.   6. Enter your e-mail address. This allows you to receive e-mail notifications when new information is available in [employer].  7. Click Sign Up. You can now view your health information.    For assistance activating your [employer] account, call [phone]

## 2017-03-07 NOTE — TELEPHONE ENCOUNTER
1. Caller Name: Mariann from [facility]                                          Call Back Number:         Patient approves a detailed voicemail message: no    Patient left before blood sample was collected.

## 2017-03-07 NOTE — PROGRESS NOTES
Subjective:     Otoniel Wing is a 26 y.o. male who presents for Hospital Follow-up.  Chart reviewed. Discharge summary available for review: Yes   Date of discharge 3/3/2017.  48- hour post discharge RN call completed on 3/6/2017 and documented in the medical record by Nehemiah Pereira.    HPI: Recently hospitalized for svt during dialysis (ESRD on dialysis), found to have upper respiratory infection (most likely virus infection). Echo done in the hospital showing EF of 30%, started with BB and ACEI. Hospitalized from 2/28 to 3/3.     Since returning home, patient reports feeling ok, no new symptoms except his bilateral lower leg bone pain, no limited ROM, no injury, no edema. Pt reported bone pain like this before but it seems to get a little worse day by day. Pt just had dialysis today. He is ambulating well despite the pain. He is noted to have renal failure related hypocalcemia, now on calcitriol and julisa. He did not have lab done today when HD.     The patient does not feel weakness; no difficulty taking care of self at home.  Patient reports taking medications as instructed.    upcoming appointment: 3/17 Cardiologist; 4/7 PCP    Allergies:   Bloodless and Vancomycin    Social History:  Social History   Substance Use Topics   • Smoking status: Former Smoker -- 0.25 packs/day for 8 years     Quit date: 11/28/2016   • Smokeless tobacco: Never Used      Comment: Smokes 6 cigarettes daily   • Alcohol Use: Yes      Comment: 3 drinks 2 x per month        ROS:  Review of Systems   Constitutional: Negative for fever, chills and malaise/fatigue.   HENT: Negative for congestion, hearing loss, sore throat and tinnitus.    Eyes: Negative for blurred vision and pain.   Respiratory: Negative for cough, sputum production, shortness of breath and wheezing.    Cardiovascular: Negative for chest pain, palpitations and leg swelling.   Gastrointestinal: Negative for heartburn, nausea, vomiting and abdominal pain.  "  Genitourinary: Negative for dysuria, urgency and frequency.   Musculoskeletal: Negative for myalgias and falls.   Skin: Negative for rash.   Neurological: Negative for dizziness, focal weakness, weakness and headaches.   Psychiatric/Behavioral: Negative for depression. The patient is not nervous/anxious.         Objective:     Blood pressure 142/68, pulse 78, temperature 35.8 °C (96.4 °F), resp. rate 16, height 1.702 m (5' 7\"), weight 59.421 kg (131 lb), SpO2 100 %.     Physical Exam:  Physical Exam   Constitutional: He is oriented to person, place, and time and well-developed, well-nourished, and in no distress.   HENT:   Head: Normocephalic and atraumatic.   Eyes: Conjunctivae are normal.   Neck: Neck supple. No JVD present.   Cardiovascular: Normal rate and regular rhythm.    No murmur heard.  Pulmonary/Chest: Effort normal and breath sounds normal. No respiratory distress.   Abdominal: Soft. Bowel sounds are normal. He exhibits no distension. There is no tenderness. There is no rebound.   Musculoskeletal: Normal range of motion. He exhibits no edema.   Neurological: He is alert and oriented to person, place, and time.   Skin: Skin is warm.   Vitals reviewed.        Assessment and Plan:     1. Hospital discharge follow-up  Hospitalization and results reviewed with patient. High risk conditions requiring teaching or care coordination were identified and addressed.The patient demonstrate understanding of admission and underlying conditions. The patient understands discharge instructions and when to seek medical attention. Medications reviewed including instructions regarding high risk medications, dosing and side effects.    The patient is able to safely adhere to ADL/IADL, treatment and medication regimen, self-manage of high-risk conditions? Yes   The patient requires physical therapy/home health/DME referral? No   The patient requires referral to care coordination/behavioral health/social work?  No   Patient " requires referral for pharmacy consult? No   Advance directive/POLST on file?  No   Required counseled on advance directive?  No      - COMP METABOLIC PANEL; Future  - PHOSPHORUS; Future    2. Systolic congestive heart failure, unspecified congestive heart failure chronicity (CMS-[facility])  - continue coreg and lisinopril  - ESRD on HD TTS, no fluid overload today, no respiratory complaints.     3. Dialysis patient (CMS-[facility]), TTS  - COMP METABOLIC PANEL; Future  - PHOSPHORUS; Future    4. Hypocalcemia  - COMP METABOLIC PANEL; Future  - PHOSPHORUS; Future  - on calcitriol and julisa      Medication Reconciliation  Medication list at end of encounter:   Current Outpatient Prescriptions   Medication Sig Dispense Refill   • lisinopril (PRINIVIL) 5 MG Tab Take 1 Tab by mouth every day. 30 Tab 3   • carvedilol (COREG) 3.125 MG Tab Take 1 Tab by mouth 2 times a day, with meals. 60 Tab 3   • calcitRIOL (ROCALTROL) 0.5 MCG Cap Take 1 Cap by mouth 2 Times a Day. 60 Cap 3   • Calcium Carbonate Antacid 1000 MG Chew Tab Take 4,000 mg by mouth every 6 hours.     • benzonatate (TESSALON) 100 MG Cap Take 2 Caps by mouth 2 times a day as needed for Cough. 30 Cap 0     No current facility-administered medications for this visit.       Primary care follow-up:  New health conditions identified during hospitalization? Yes   Labs/pathology/imaging requires future PCP follow-up?  No   Changes to medications during hospitalization or today? No     Recommended followup: No Follow-up on file. with Pcp Pt States None   Future Appointments       Provider Department Center    3/17/2017 3:00 PM Kimmy Godinez M.D. [facility]     4/7/2017 9:00 AM VIVIENNE Alvarado [facility] [address]          Patient Instruction  Patient offered educational material on discharge diagnosis and management of symptoms/red flags. Patient instructed to keep follow-up appointments and to bring written  questions and and actual medications to each office visit. Patient instructed to call PCP/specialist with any problems/questions/concerns. Patient verbalizes understanding and has no further questions at this time.    Face-to-face transitional care management services with moderate complexity medical decision making.

## 2017-03-07 NOTE — PATIENT INSTRUCTIONS
If you need further assistance, or have any questions; concerns or lingering symptoms before seeing your Primary Care Provider or specialist.     Do not hesitate to contact us.     Please contact us at the Post-Hospital Follow Up Program at [phone].   Our offices hours are Monday-Friday 8 am-5 pm.

## 2017-03-07 NOTE — PROGRESS NOTES
POST DISCHARGE CALL MADE BY Nehemiah Pereira  Discharge Date:3/3/2017   Date of Outreach Call: 3/6/2017  1:30 PM  Now that you're home, how are you doing? Good  Comment:Pt states he is doing well since hospital  discharge and voices no complaints.  Pt has next dialysis  appt on 3/7.  Do you have questions about your medications? No    Did you fill your medications? Yes  Comment:Pt states he is taking all meds as prescribed.    Do you have a follow-up appointment scheduled?Yes  Comment:Pt will f/u at discharge clinic on 3/7/17 at  8:40 AM.  Pt states he will drive himself to appt.  Pt  states he is familiar with location of [facility].  Pt will  f/u with Dr. Godinez at cardiology on 3/17.    Discharging Department: Telemetry 8    Number of Attempts: 1  Current or previous attempts completed within two business days of discharge? Yes  Provided education regarding treatment plan, medication, self-management, ADLs? Yes  Has patient completed Advance Directive? If yes, advise them to bring to appointment. No  Care Manager phone number provided? Yes  Comment:Jyotsna at [phone]  Is there anything else I can help you with? No

## 2017-03-13 NOTE — TELEPHONE ENCOUNTER
Called patient and informed of provider's message below.  Outcome: Patient voiced understanding, and stated he will have blood drawn at with dialysis.

## 2017-03-17 ENCOUNTER — OFFICE VISIT (OUTPATIENT)
Dept: CARDIOLOGY | Facility: MEDICAL CENTER | Age: 27
End: 2017-03-17
Payer: COMMERCIAL

## 2017-03-17 VITALS
HEIGHT: 67 IN | DIASTOLIC BLOOD PRESSURE: 80 MMHG | BODY MASS INDEX: 20.4 KG/M2 | OXYGEN SATURATION: 100 % | SYSTOLIC BLOOD PRESSURE: 110 MMHG | WEIGHT: 130 LBS | HEART RATE: 76 BPM

## 2017-03-17 DIAGNOSIS — I50.20 ACC/AHA STAGE C SYSTOLIC HEART FAILURE (HCC): ICD-10-CM

## 2017-03-17 DIAGNOSIS — I50.9 HEART FAILURE, NYHA CLASS 2 (HCC): ICD-10-CM

## 2017-03-17 DIAGNOSIS — N18.6 END STAGE RENAL DISEASE (HCC): ICD-10-CM

## 2017-03-17 DIAGNOSIS — R06.09 DYSPNEA ON EXERTION: ICD-10-CM

## 2017-03-17 DIAGNOSIS — Z99.2 DIALYSIS PATIENT (HCC): ICD-10-CM

## 2017-03-17 PROCEDURE — 99214 OFFICE O/P EST MOD 30 MIN: CPT | Mod: 25 | Performed by: INTERNAL MEDICINE

## 2017-03-17 PROCEDURE — 94620 PR PULMONARY STRESS TESTING,SIMPLE: CPT | Performed by: INTERNAL MEDICINE

## 2017-03-17 RX ORDER — CARVEDILOL 6.25 MG/1
6.25 TABLET ORAL 2 TIMES DAILY WITH MEALS
Qty: 60 TAB | Refills: 11 | Status: SHIPPED | OUTPATIENT
Start: 2017-03-17 | End: 2018-02-15

## 2017-03-17 ASSESSMENT — ENCOUNTER SYMPTOMS
PND: 0
EYE DISCHARGE: 0
COUGH: 0
VOMITING: 0
FEVER: 0
MYALGIAS: 0
EYE PAIN: 0
SHORTNESS OF BREATH: 0
NAUSEA: 0
ABDOMINAL PAIN: 0
DEPRESSION: 0
SENSORY CHANGE: 0
PALPITATIONS: 0
DIZZINESS: 0
LOSS OF CONSCIOUSNESS: 0
HEADACHES: 0
FALLS: 0
HALLUCINATIONS: 0
BRUISES/BLEEDS EASILY: 0
CHILLS: 0
WEIGHT LOSS: 0
CLAUDICATION: 0
SPEECH CHANGE: 0
BLURRED VISION: 0
ORTHOPNEA: 0
BLOOD IN STOOL: 0
DOUBLE VISION: 0

## 2017-03-17 NOTE — MR AVS SNAPSHOT
"Otoniel Wing   3/17/2017 3:00 PM   Office Visit   MRN: [id number]    Department:  [facility]   Dept Phone:  [phone]    Description:  Male : 1990   Provider:  Kimmy Godinez M.D.           Reason for Visit     New Patient           Allergies as of 3/17/2017     Allergen Noted Reactions    Bloodless 2014       Vancomycin 10/26/2015   Itching    Pt states itching.      You were diagnosed with     ACC/AHA stage C systolic heart failure (CMS-HCC)   [6728234]       Heart failure, NYHA class 2 (CMS-HCC)   [609117]       End stage renal disease (CMS-HCC)   [585.6.ICD-9-CM]       Dialysis patient (CMS-HCC)   [622667]       Dyspnea on exertion   [990959]         Vital Signs     Blood Pressure Pulse Height Weight Body Mass Index Oxygen Saturation    110/80 mmHg 76 1.702 m (5' 7.01\") 58.968 kg (130 lb) 20.36 kg/m2 100%    Smoking Status                   Former Smoker           Basic Information     Date Of Birth Sex Race Ethnicity Preferred Language    1990 Male  or   Origin (Belarusian,Serbian,Hungarian,Elio, etc) English      Your appointments     2017  9:00 AM   New Patient with VIVIENNE Alvarado   [facility] [address]    [address]  [facility] [address]  [place] [address]   [phone]           Please bring Photo ID, Insurance Cards, All Medication Bottles and copies of any legal documents (such as Living Will, Power of ) If speaking a language besides English please bring an adult . Please arrive 30 minutes prior for check in and registration. You will be receiving a confirmation call a few days before your appointment from our automated call confirmation system.              Problem List              ICD-10-CM Priority Class Noted - Resolved    Cystic acne L70.0   2009 - Present    Renal failure N19   2009 - Present    Dialysis patient (CMS-HCC) Z99.2   2012 - Present    HTN " (hypertension) I10   1/25/2012 - Present    ESRD (end stage renal disease) (CMS-HCC) N18.6   10/28/2013 - Present    CHF (congestive heart failure) (CMS-HCC) I50.9   1/27/2014 - Present    Pulmonary edema J81.1   1/28/2014 - Present    Anemia of chronic renal failure N18.9, D63.1   1/28/2014 - Present    Chest pain R07.9 High  11/18/2014 - Present    End stage renal disease (CMS-HCC) N18.6   12/22/2014 - Present    Depressive disorder F32.9   12/28/2016 - Present    Cardiomyopathy due to hypertension, with heart failure (CMS-HCC) ef 30% I11.0   3/2/2017 - Present      Health Maintenance        Date Due Completion Dates    IMM DTaP/Tdap/Td Vaccine (1 - Tdap) 5/23/2007 ---    IMM PNEUMOCOCCAL 19-64 (ADULT) HIGHEST RISK SERIES (3 of 3 - PPSV23) 4/26/2017 3/1/2017, 11/11/2009            Current Immunizations     13-VALENT PCV PREVNAR 3/1/2017  5:45 AM    Influenza TIV (IM) 10/2/2016, 10/9/2015, 10/18/2014, 11/13/2013    Pneumococcal polysaccharide vaccine (PPSV-23) 11/11/2009 11:15 AM      Below and/or attached are the medications your provider expects you to take. Review all of your home medications and newly ordered medications with your provider and/or pharmacist. Follow medication instructions as directed by your provider and/or pharmacist. Please keep your medication list with you and share with your provider. Update the information when medications are discontinued, doses are changed, or new medications (including over-the-counter products) are added; and carry medication information at all times in the event of emergency situations     Allergies:  BLOODLESS - (reactions not documented)     VANCOMYCIN - Itching               Medications  Valid as of: March 17, 2017 -  3:43 PM    Generic Name Brand Name Tablet Size Instructions for use    Benzonatate (Cap) TESSALON 100 MG Take 2 Caps by mouth 2 times a day as needed for Cough.        Calcitriol (Cap) ROCALTROL 0.5 MCG Take 1 Cap by mouth 2 Times a Day.         Calcium Carbonate Antacid (Chew Tab) Calcium Carbonate Antacid 1000 MG Take 4,000 mg by mouth every 6 hours.        Carvedilol (Tab) COREG 6.25 MG Take 1 Tab by mouth 2 times a day, with meals.        Lisinopril (Tab) PRINIVIL 5 MG Take 1 Tab by mouth every day.        .                 Medicines prescribed today were sent to:     [facility] [address] [place] [address] (S), [place] - [address] [id number] [facility] (S) [place] [address]    Phone: [phone] Fax: [phone]    Open 24 Hours?: No      Medication refill instructions:       If your prescription bottle indicates you have medication refills left, it is not necessary to call your provider’s office. Please contact your pharmacy and they will refill your medication.    If your prescription bottle indicates you do not have any refills left, you may request refills at any time through one of the following ways: The online [employer] system (except Urgent Care), by calling your provider’s office, or by asking your pharmacy to contact your provider’s office with a refill request. Medication refills are processed only during regular business hours and may not be available until the next business day. Your provider may request additional information or to have a follow-up visit with you prior to refilling your medication.   *Please Note: Medication refills are assigned a new Rx number when refilled electronically. Your pharmacy may indicate that no refills were authorized even though a new prescription for the same medication is available at the pharmacy. Please request the medicine by name with the pharmacy before contacting your provider for a refill.           [employer] Access Code: [email or web address]  Expires: 4/12/2017 12:28 PM    [employer]  A secure, online tool to manage your health information     [employer]’s [employer]® is a secure, online tool that connects you to your personalized health information from the privacy of your home -- day or night - making it very easy  for you to manage your healthcare. Once the activation process is completed, you can even access your medical information using the [employer] joshua, which is available for free in the Apple [employer] store or Google Play store.     [employer] provides the following levels of access (as shown below):   My Chart Features   [facility] Primary [facility] Doctor [facility]  Specialists [facility]  Urgent  Care Non-[facility]  Primary Care  Doctor   Email your healthcare team securely and privately 24/7 X X X    Manage appointments: schedule your next appointment; view details of past/upcoming appointments X      Request prescription refills. X      View recent personal medical records, including lab and immunizations X X X X   View health record, including health history, allergies, medications X X X X   Read reports about your outpatient visits, procedures, consult and ER notes X X X X   See your discharge summary, which is a recap of your hospital and/or ER visit that includes your diagnosis, lab results, and care plan. X X       How to register for [employer]:  1. Go to  [email or web address].  2. Click on the Sign Up Now box, which takes you to the New Member Sign Up page. You will need to provide the following information:  a. Enter your [employer] Access Code exactly as it appears at the top of this page. (You will not need to use this code after you’ve completed the sign-up process. If you do not sign up before the expiration date, you must request a new code.)   b. Enter your date of birth.   c. Enter your home email address.   d. Click Submit, and follow the next screen’s instructions.  3. Create a [employer] ID. This will be your [employer] login ID and cannot be changed, so think of one that is secure and easy to remember.  4. Create a [employer] password. You can change your password at any time.  5. Enter your Password Reset Question and Answer. This can be used at a later time if you forget your password.   6. Enter your e-mail address. This allows  you to receive e-mail notifications when new information is available in [employer].  7. Click Sign Up. You can now view your health information.    For assistance activating your [employer] account, call [phone]

## 2017-03-17 NOTE — PROGRESS NOTES
Subjective:   Otoniel Wing is a 26 y.o. male who presents today for cardiac care and evaluation in our heart failure clinic after his recent hospital stay. Patient went to the hospital because of shortness of breath and fast heart rates. His rhythm was found to be in sinus tachycardia. Patient also has a history of end-stage renal disease for which he is on hemodialysis. He cannot be placed on transplant list because of social issues. He was found to be having possible viral upper respiratory tract infection. During his hospital stay, patient was also found to have depressed left ventricular systolic function documented to be at 30%. Patient was placed on beta blocker and ACE inhibitor and was discharged home. I am not certain that patient was in the hospital because of heart failure exacerbation.    Patient was able to complete 438 m during his 6 minute walk test. his O2 saturation at baseline was 100% and at the end of the test, the O2 saturation was 99%. he reported 1 level of dyspnea on Iker scale.    Patient is feeling better these days. Does get winded upon walking up inclines or for distance. No symptoms at rest or with daily living activities.    Past Medical History   Diagnosis Date   • Changing skin lesion 8/17/2009   • Renal disorder L arm fistula   • Renal failure    • Hypertension    • CHF (congestive heart failure) (CMS-HCC) 1/27/2014   • Indigestion    • Anesthesia      PONV-non recently   • Encounter for renal dialysis      Tues, Thurs, Sat   • History of anemia      due to renal function   • Pneumonia 8/2015   • Breath shortness      with chf exacerbation/fluid overload   • Chest pain 11/18/2014   • Pain      bone pain due to parathyroid     Past Surgical History   Procedure Laterality Date   • Av fistula creation  2005     left arm   • Av fistula revision  10/13/2009     Performed by ANSELMO DEE at SURGERY [facility]   • Cath placement  10/13/2009     Performed by ANSELMO DEE  APRIL at SURGERY [facility]   • Appendectomy laparoscopic  11/10/2009     Performed by VLADISLAV PALACIO at SURGERY [facility]   • Av fistula revision  10/28/2013     Performed by Chris Felix M.D. at SURGERY [facility]   • Av fistula creation  12/22/2014     Performed by Chris Felix M.D. at SURGERY [facility]   • Parathyroid exploration  8/10/2016     Procedure: PARATHYROID EXPLORATION with BILATERAL NIMS NERVE monitoring and cervical thymectomy ;  Surgeon: Topher Ramirez M.D.;  Location: SURGERY SAME DAY [facility];  Service:      Family History   Problem Relation Age of Onset   • Depression Mother      History   Smoking status   • Former Smoker -- 0.25 packs/day for 8 years   • Quit date: 11/28/2016   Smokeless tobacco   • Never Used     Comment: Smokes 6 cigarettes daily     Allergies   Allergen Reactions   • Bloodless    • Vancomycin Itching     Pt states itching.     Outpatient Encounter Prescriptions as of 3/17/2017   Medication Sig Dispense Refill   • carvedilol (COREG) 6.25 MG Tab Take 1 Tab by mouth 2 times a day, with meals. 60 Tab 11   • lisinopril (PRINIVIL) 5 MG Tab Take 1 Tab by mouth every day. 30 Tab 3   • calcitRIOL (ROCALTROL) 0.5 MCG Cap Take 1 Cap by mouth 2 Times a Day. 60 Cap 3   • Calcium Carbonate Antacid 1000 MG Chew Tab Take 4,000 mg by mouth every 6 hours.     • [DISCONTINUED] carvedilol (COREG) 3.125 MG Tab Take 1 Tab by mouth 2 times a day, with meals. 60 Tab 3   • benzonatate (TESSALON) 100 MG Cap Take 2 Caps by mouth 2 times a day as needed for Cough. 30 Cap 0     No facility-administered encounter medications on file as of 3/17/2017.     Review of Systems   Constitutional: Negative for fever, chills, weight loss and malaise/fatigue.   HENT: Negative for ear discharge, ear pain, hearing loss and nosebleeds.    Eyes: Negative for blurred vision, double vision, pain and discharge.   Respiratory: Negative for cough and shortness of breath.    Cardiovascular:  "Negative for chest pain, palpitations, orthopnea, claudication, leg swelling and PND.   Gastrointestinal: Negative for nausea, vomiting, abdominal pain, blood in stool and melena.   Genitourinary: Negative for dysuria and hematuria.   Musculoskeletal: Negative for myalgias, joint pain and falls.   Skin: Negative for itching and rash.   Neurological: Negative for dizziness, sensory change, speech change, loss of consciousness and headaches.   Endo/Heme/Allergies: Negative for environmental allergies. Does not bruise/bleed easily.   Psychiatric/Behavioral: Negative for depression, suicidal ideas and hallucinations.        Objective:   /80 mmHg  Pulse 76  Ht 1.702 m (5' 7.01\")  Wt 58.968 kg (130 lb)  BMI 20.36 kg/m2  SpO2 100%    Physical Exam   Constitutional: He is oriented to person, place, and time. He appears well-developed and well-nourished. No distress.   HENT:   Head: Normocephalic and atraumatic.   Eyes: EOM are normal.   Neck: Normal range of motion. No JVD present.   Cardiovascular: Normal rate, regular rhythm, normal heart sounds and intact distal pulses.  Exam reveals no gallop and no friction rub.    No murmur heard.  Bilateral femoral pulses are 2+, bilateral dorsalis pedis pulses are 2+, bilateral posterior tibialis pulses are 2+.   Pulmonary/Chest: No respiratory distress. He has no wheezes. He has no rales. He exhibits no tenderness.   Abdominal: Soft. Bowel sounds are normal. There is no tenderness. There is no rebound and no guarding.   The is no presence of abdominal bruits   Musculoskeletal: Normal range of motion.   Neurological: He is alert and oriented to person, place, and time.   Skin: Skin is warm and dry.   Psychiatric: He has a normal mood and affect.   Nursing note and vitals reviewed.      Assessment:     1. ACC/AHA stage C systolic heart failure (CMS-HCC)  [place] PULMONARY STRESS TESTING,SIMPLE    carvedilol (COREG) 6.25 MG Tab   2. Heart failure, NYHA class 2 (CMS-HCC)  [place] " PULMONARY STRESS TESTING,SIMPLE    carvedilol (COREG) 6.25 MG Tab   3. End stage renal disease (CMS-HCC)  [place] PULMONARY STRESS TESTING,SIMPLE    carvedilol (COREG) 6.25 MG Tab   4. Dialysis patient ([facility])  [place] PULMONARY STRESS TESTING,SIMPLE    carvedilol (COREG) 6.25 MG Tab   5. Dyspnea on exertion  [place] PULMONARY STRESS TESTING,SIMPLE    carvedilol (COREG) 6.25 MG Tab       Medical Decision Making:  Today's Assessment / Status / Plan:     Today, based on physical examination findings, patient is euvolemic. No JVD, lungs are clear to auscultation, no pitting edema in bilateral lower extremities, no ascites.    We will increase carvedilol to 6.25 mg twice a day.    Continue lisinopril 5 mg at this time. Patient is currently getting hemodialysis so there is no contraindication.    We will bring him back in 4 weeks with Toribio and at that time if blood pressure and symptom permit, we will increase his carvedilol to 12.5 mg by mouth twice a day.    We will consider ICD placement for primary prevention in 3 months if his left ventricular systolic function remains less than 35% after optimization of his evidence based heart failure medical regimen.

## 2017-03-20 NOTE — PROGRESS NOTES
6MWT- 438 meters  MLWHF- 55        Reviewed anatomy and physiology of heart failure with patient. Went over heart failure worksheet and patient's individual HF diagnosis, EF, risk factors, general medication classes and indications, as well as personal goals.  Goals: Patient's personal goal is to maintain his current state of health.    Discussed daily weights, sodium restriction, worsening signs and symptoms to report to physician, heart medications, and importance of adherence to medication regimen. Patient v/u to all HF management topics, but did not engage in conversation and did not make any commitments to altering his lifestyle.    Reviewed dietary handouts, advance directive planning handout, and informed patient of HF new appointment follow-up phone call. Minimally reviewed dietary handouts as patient is on dialysis and receives a lot of dietary teaching from the RD at his dialysis unit.     Invited patient and family members/friends to HF support group and encouraged patient to call Heart Failure clinic during normal business hours with any questions.        Patient states full understanding of all information given.     Charla HF RN  [phone]

## 2017-03-31 ENCOUNTER — TELEPHONE (OUTPATIENT)
Dept: MEDICAL GROUP | Facility: MEDICAL CENTER | Age: 27
End: 2017-03-31

## 2017-03-31 NOTE — TELEPHONE ENCOUNTER
Left message for patient to call back regarding NP pre-visit planning. Please transfer call to [phone].

## 2017-04-03 ENCOUNTER — HOSPITAL ENCOUNTER (OUTPATIENT)
Dept: HOSPITAL 8 - OUT | Age: 27
End: 2017-04-03
Attending: SURGERY
Payer: COMMERCIAL

## 2017-04-03 DIAGNOSIS — Z02.9: Primary | ICD-10-CM

## 2017-04-07 ENCOUNTER — TELEPHONE (OUTPATIENT)
Dept: MEDICAL GROUP | Facility: MEDICAL CENTER | Age: 27
End: 2017-04-07

## 2017-04-14 ENCOUNTER — OFFICE VISIT (OUTPATIENT)
Dept: CARDIOLOGY | Facility: MEDICAL CENTER | Age: 27
End: 2017-04-14
Payer: COMMERCIAL

## 2017-04-14 VITALS
DIASTOLIC BLOOD PRESSURE: 58 MMHG | OXYGEN SATURATION: 98 % | BODY MASS INDEX: 20.56 KG/M2 | HEIGHT: 67 IN | HEART RATE: 89 BPM | WEIGHT: 131 LBS | SYSTOLIC BLOOD PRESSURE: 100 MMHG

## 2017-04-14 DIAGNOSIS — Z99.2 DIALYSIS PATIENT (HCC): ICD-10-CM

## 2017-04-14 DIAGNOSIS — I50.20 ACC/AHA STAGE C SYSTOLIC HEART FAILURE (HCC): ICD-10-CM

## 2017-04-14 DIAGNOSIS — N18.6 ESRD (END STAGE RENAL DISEASE) (HCC): ICD-10-CM

## 2017-04-14 DIAGNOSIS — I50.9 HEART FAILURE, NYHA CLASS 2 (HCC): ICD-10-CM

## 2017-04-14 PROCEDURE — 99215 OFFICE O/P EST HI 40 MIN: CPT | Performed by: INTERNAL MEDICINE

## 2017-04-14 ASSESSMENT — ENCOUNTER SYMPTOMS
MYALGIAS: 0
BLURRED VISION: 0
WEIGHT LOSS: 0
DEPRESSION: 0
EYE DISCHARGE: 0
PALPITATIONS: 0
LOSS OF CONSCIOUSNESS: 0
BRUISES/BLEEDS EASILY: 0
HEADACHES: 0
CHILLS: 0
BLOOD IN STOOL: 0
DOUBLE VISION: 0
ABDOMINAL PAIN: 0
SENSORY CHANGE: 0
CLAUDICATION: 0
SHORTNESS OF BREATH: 0
FEVER: 0
FALLS: 0
NAUSEA: 0
ORTHOPNEA: 0
DIZZINESS: 0
SPEECH CHANGE: 0
VOMITING: 0
HALLUCINATIONS: 0
PND: 0
EYE PAIN: 0
COUGH: 0

## 2017-04-14 NOTE — PROGRESS NOTES
Subjective:   Otoniel Wing is a 26 y.o. male who presents today for cardiac care and evaluation in our heart failure clinic after his recent hospital stay. Patient went to the hospital because of shortness of breath and fast heart rates. His rhythm was found to be in sinus tachycardia. Patient also has a history of end-stage renal disease for which he is on hemodialysis. He cannot be placed on transplant list because of social issues. He was found to be having possible viral upper respiratory tract infection. During his hospital stay, patient was also found to have depressed left ventricular systolic function documented to be at 30%. Patient was placed on beta blocker and ACE inhibitor and was discharged home. I am not certain that patient was in the hospital because of heart failure exacerbation.    Patient was able to complete 438 m during his 6 minute walk test. his O2 saturation at baseline was 100% and at the end of the test, the O2 saturation was 99%. he reported 1 level of dyspnea on Iker scale.    Patient is feeling better these days. Does get winded upon walking up inclines or for distance. No symptoms at rest or with daily living activities.    Was not able to tolerate higher doses of Coreg and Lisinopril due to light headedness and not feeling energetic.    Getting HD 3 times a week.    Past Medical History   Diagnosis Date   • Changing skin lesion 8/17/2009   • Renal disorder L arm fistula   • Renal failure    • Hypertension    • CHF (congestive heart failure) (CMS-[facility]) 1/27/2014   • Indigestion    • Anesthesia      PONV-non recently   • Encounter for renal dialysis      Tues, Thurs, Sat   • History of anemia      due to renal function   • Pneumonia 8/2015   • Breath shortness      with chf exacerbation/fluid overload   • Chest pain 11/18/2014   • Pain      bone pain due to parathyroid     Past Surgical History   Procedure Laterality Date   • Av fistula creation  2005     left arm   • Av  fistula revision  10/13/2009     Performed by CHRIS FELIX at SURGERY [facility]S   • Cath placement  10/13/2009     Performed by CHRIS FELIX at SURGERY [facility]   • Appendectomy laparoscopic  11/10/2009     Performed by VLADISLAV PALACIO at SURGERY [facility] ORS   • Av fistula revision  10/28/2013     Performed by Chris Felix M.D. at SURGERY [facility]   • Av fistula creation  12/22/2014     Performed by Chris Felix M.D. at SURGERY [facility] ORS   • Parathyroid exploration  8/10/2016     Procedure: PARATHYROID EXPLORATION with BILATERAL NIMS NERVE monitoring and cervical thymectomy ;  Surgeon: Topher Ramirez M.D.;  Location: SURGERY SAME DAY [facility];  Service:      Family History   Problem Relation Age of Onset   • Depression Mother      History   Smoking status   • Former Smoker -- 0.25 packs/day for 8 years   • Quit date: 11/28/2016   Smokeless tobacco   • Never Used     Comment: Smokes 6 cigarettes daily     Allergies   Allergen Reactions   • Bloodless    • Vancomycin Itching     Pt states itching.     Outpatient Encounter Prescriptions as of 4/14/2017   Medication Sig Dispense Refill   • carvedilol (COREG) 6.25 MG Tab Take 1 Tab by mouth 2 times a day, with meals. 60 Tab 11   • lisinopril (PRINIVIL) 5 MG Tab Take 1 Tab by mouth every day. 30 Tab 3   • calcitRIOL (ROCALTROL) 0.5 MCG Cap Take 1 Cap by mouth 2 Times a Day. 60 Cap 3   • Calcium Carbonate Antacid 1000 MG Chew Tab Take 4,000 mg by mouth every 6 hours.     • benzonatate (TESSALON) 100 MG Cap Take 2 Caps by mouth 2 times a day as needed for Cough. 30 Cap 0     No facility-administered encounter medications on file as of 4/14/2017.     Review of Systems   Constitutional: Negative for fever, chills, weight loss and malaise/fatigue.   HENT: Negative for ear discharge, ear pain, hearing loss and nosebleeds.    Eyes: Negative for blurred vision, double vision, pain and discharge.   Respiratory: Negative for cough and  "shortness of breath.    Cardiovascular: Negative for chest pain, palpitations, orthopnea, claudication, leg swelling and PND.   Gastrointestinal: Negative for nausea, vomiting, abdominal pain, blood in stool and melena.   Genitourinary: Negative for dysuria and hematuria.   Musculoskeletal: Negative for myalgias, joint pain and falls.   Skin: Negative for itching and rash.   Neurological: Negative for dizziness, sensory change, speech change, loss of consciousness and headaches.   Endo/Heme/Allergies: Negative for environmental allergies. Does not bruise/bleed easily.   Psychiatric/Behavioral: Negative for depression, suicidal ideas and hallucinations.        Objective:   /58 mmHg  Pulse 89  Ht 1.702 m (5' 7.01\")  Wt 59.421 kg (131 lb)  BMI 20.51 kg/m2  SpO2 98%    Physical Exam   Constitutional: He is oriented to person, place, and time. He appears well-developed and well-nourished.   HENT:   Head: Normocephalic and atraumatic.   Eyes: EOM are normal.   Neck: Normal range of motion. No JVD present.   Cardiovascular: Normal rate, regular rhythm, normal heart sounds and intact distal pulses.  Exam reveals no gallop and no friction rub.    No murmur heard.  Bilateral femoral pulses are 2+, bilateral dorsalis pedis pulses are 2+, bilateral posterior tibialis pulses are 2+.   Pulmonary/Chest: No respiratory distress. He has no wheezes. He has no rales. He exhibits no tenderness.   Abdominal: Soft. Bowel sounds are normal. There is no tenderness. There is no rebound and no guarding.   The is no presence of abdominal bruits   Musculoskeletal: Normal range of motion.   Neurological: He is alert and oriented to person, place, and time.   Skin: Skin is warm and dry.   Psychiatric: He has a normal mood and affect.   Nursing note and vitals reviewed.      Assessment:     1. ACC/AHA stage C systolic heart failure (CMS-HCC)     2. Heart failure, NYHA class 2 (CMS-HCC)     3. ESRD (end stage renal disease) (CMS-HCC)   "   4. Dialysis patient (CMS-[facility])         Medical Decision Making:  Today's Assessment / Status / Plan:     Today, based on physical examination findings, patient is euvolemic. No JVD, lungs are clear to auscultation, no pitting edema in bilateral lower extremities, no ascites.    At this time, will continue Coreg and Lisinpril at current dose.  Cannot add Spironolactone due to borderline BP.    We will consider ICD placement for primary prevention in 2 months if his left ventricular systolic function remains less than 35% after optimization of his evidence based heart failure medical regimen.      I will see patient back in our Heart Failure Clinic with lab tests and studies results in 4 weeks with Thais Toribio.

## 2017-04-14 NOTE — MR AVS SNAPSHOT
"Otoniel Wing   2017 9:45 AM   Office Visit   MRN: [id number]    Department:  [facility]   Dept Phone:  [phone]    Description:  Male : 1990   Provider:  Kimmy Godinez M.D.           Reason for Visit     Follow-Up           Allergies as of 2017     Allergen Noted Reactions    Bloodless 2014       Vancomycin 10/26/2015   Itching    Pt states itching.      You were diagnosed with     ACC/AHA stage C systolic heart failure (CMS-HCC)   [8465019]       Heart failure, NYHA class 2 (CMS-HCC)   [130794]       ESRD (end stage renal disease) (CMS-HCC)   [473100]       Dialysis patient (CMS-HCC)   [643583]         Vital Signs     Blood Pressure Pulse Height Weight Body Mass Index Oxygen Saturation    100/58 mmHg 89 1.702 m (5' 7.01\") 59.421 kg (131 lb) 20.51 kg/m2 98%    Smoking Status                   Former Smoker           Basic Information     Date Of Birth Sex Race Ethnicity Preferred Language    1990 Male  or   Origin (Hungarian,Moroccan,Scottish,Elio, etc) English      Problem List              ICD-10-CM Priority Class Noted - Resolved    Cystic acne L70.0   2009 - Present    Renal failure N19   2009 - Present    Dialysis patient (CMS-HCC) Z99.2   2012 - Present    HTN (hypertension) I10   2012 - Present    ESRD (end stage renal disease) (CMS-HCC) N18.6   10/28/2013 - Present    CHF (congestive heart failure) (CMS-HCC) I50.9   2014 - Present    Pulmonary edema J81.1   2014 - Present    Anemia of chronic renal failure N18.9, D63.1   2014 - Present    Chest pain R07.9 High  2014 - Present    End stage renal disease (CMS-HCC) N18.6   2014 - Present    Depressive disorder F32.9   2016 - Present    Cardiomyopathy due to hypertension, with heart failure (CMS-HCC) ef 30% I11.0   3/2/2017 - Present    ACC/AHA stage C systolic heart failure (CMS-HCC) I50.20   2017 - Present      Health " Maintenance        Date Due Completion Dates    IMM DTaP/Tdap/Td Vaccine (1 - Tdap) 5/23/2007 ---    IMM PNEUMOCOCCAL 19-64 (ADULT) HIGHEST RISK SERIES (3 of 3 - PPSV23) 4/26/2017 3/1/2017, 11/11/2009            Current Immunizations     13-VALENT PCV PREVNAR 3/1/2017  5:45 AM    Influenza TIV (IM) 10/2/2016, 10/9/2015, 10/18/2014, 11/13/2013    Pneumococcal polysaccharide vaccine (PPSV-23) 11/11/2009 11:15 AM      Below and/or attached are the medications your provider expects you to take. Review all of your home medications and newly ordered medications with your provider and/or pharmacist. Follow medication instructions as directed by your provider and/or pharmacist. Please keep your medication list with you and share with your provider. Update the information when medications are discontinued, doses are changed, or new medications (including over-the-counter products) are added; and carry medication information at all times in the event of emergency situations     Allergies:  BLOODLESS - (reactions not documented)     VANCOMYCIN - Itching               Medications  Valid as of: April 14, 2017 - 10:17 AM    Generic Name Brand Name Tablet Size Instructions for use    Benzonatate (Cap) TESSALON 100 MG Take 2 Caps by mouth 2 times a day as needed for Cough.        Calcitriol (Cap) ROCALTROL 0.5 MCG Take 1 Cap by mouth 2 Times a Day.        Calcium Carbonate Antacid (Chew Tab) Calcium Carbonate Antacid 1000 MG Take 4,000 mg by mouth every 6 hours.        Carvedilol (Tab) COREG 6.25 MG Take 1 Tab by mouth 2 times a day, with meals.        Lisinopril (Tab) PRINIVIL 5 MG Take 1 Tab by mouth every day.        .                 Medicines prescribed today were sent to:     [facility] [place] - [address] [address]    [facility] [facility] [address] [place] [address]    Phone: [phone] Fax: [phone]    Open 24 Hours?: No      Medication refill instructions:       If your prescription bottle indicates you have  medication refills left, it is not necessary to call your provider’s office. Please contact your pharmacy and they will refill your medication.    If your prescription bottle indicates you do not have any refills left, you may request refills at any time through one of the following ways: The online Modumetal system (except Urgent Care), by calling your provider’s office, or by asking your pharmacy to contact your provider’s office with a refill request. Medication refills are processed only during regular business hours and may not be available until the next business day. Your provider may request additional information or to have a follow-up visit with you prior to refilling your medication.   *Please Note: Medication refills are assigned a new Rx number when refilled electronically. Your pharmacy may indicate that no refills were authorized even though a new prescription for the same medication is available at the pharmacy. Please request the medicine by name with the pharmacy before contacting your provider for a refill.           Modumetal Access Code: [email or web address]  Expires: 5/11/2017 12:19 PM    Modumetal  A secure, online tool to manage your health information     [employer]’s Modumetal® is a secure, online tool that connects you to your personalized health information from the privacy of your home -- day or night - making it very easy for you to manage your healthcare. Once the activation process is completed, you can even access your medical information using the Modumetal joshua, which is available for free in the Apple Joshua store or Google Play store.     Modumetal provides the following levels of access (as shown below):   My Chart Features   [facility]  Specialists [facility]  Urgent  Care Non-[facility]  Primary Care  Doctor   Email your healthcare team securely and privately 24/7 X X X    Manage appointments: schedule your next appointment; view details of past/upcoming appointments X       Request prescription refills. X      View recent personal medical records, including lab and immunizations X X X X   View health record, including health history, allergies, medications X X X X   Read reports about your outpatient visits, procedures, consult and ER notes X X X X   See your discharge summary, which is a recap of your hospital and/or ER visit that includes your diagnosis, lab results, and care plan. X X       How to register for Cloakroom:  1. Go to  [email or web address].  2. Click on the Sign Up Now box, which takes you to the New Member Sign Up page. You will need to provide the following information:  a. Enter your Cloakroom Access Code exactly as it appears at the top of this page. (You will not need to use this code after you’ve completed the sign-up process. If you do not sign up before the expiration date, you must request a new code.)   b. Enter your date of birth.   c. Enter your home email address.   d. Click Submit, and follow the next screen’s instructions.  3. Create a Cloakroom ID. This will be your Cloakroom login ID and cannot be changed, so think of one that is secure and easy to remember.  4. Create a Cloakroom password. You can change your password at any time.  5. Enter your Password Reset Question and Answer. This can be used at a later time if you forget your password.   6. Enter your e-mail address. This allows you to receive e-mail notifications when new information is available in Cloakroom.  7. Click Sign Up. You can now view your health information.    For assistance activating your Cloakroom account, call [phone]

## 2017-04-17 ENCOUNTER — HOSPITAL ENCOUNTER (OUTPATIENT)
Dept: HOSPITAL 8 - OUT | Age: 27
Discharge: HOME | End: 2017-04-17
Attending: SURGERY
Payer: COMMERCIAL

## 2017-04-17 VITALS — WEIGHT: 135.58 LBS | HEIGHT: 68 IN | BODY MASS INDEX: 20.55 KG/M2

## 2017-04-17 VITALS — DIASTOLIC BLOOD PRESSURE: 82 MMHG | SYSTOLIC BLOOD PRESSURE: 132 MMHG

## 2017-04-17 DIAGNOSIS — T82.590A: Primary | ICD-10-CM
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
## 2017-04-17 DIAGNOSIS — N18.6: ICD-10-CM

## 2017-04-17 DIAGNOSIS — I50.9: ICD-10-CM

## 2017-04-17 PROCEDURE — 80047 BASIC METABLC PNL IONIZED CA: CPT

## 2017-04-17 PROCEDURE — 36415 COLL VENOUS BLD VENIPUNCTURE: CPT

## 2017-04-17 PROCEDURE — 36832 AV FISTULA REVISION OPEN: CPT

## 2017-06-05 ENCOUNTER — OFFICE VISIT (OUTPATIENT)
Dept: CARDIOLOGY | Facility: MEDICAL CENTER | Age: 27
End: 2017-06-05
Payer: COMMERCIAL

## 2017-06-05 VITALS
HEIGHT: 67 IN | HEART RATE: 82 BPM | BODY MASS INDEX: 21.5 KG/M2 | WEIGHT: 137 LBS | SYSTOLIC BLOOD PRESSURE: 142 MMHG | OXYGEN SATURATION: 98 % | DIASTOLIC BLOOD PRESSURE: 88 MMHG

## 2017-06-05 DIAGNOSIS — N18.6 ESRD (END STAGE RENAL DISEASE) (HCC): ICD-10-CM

## 2017-06-05 DIAGNOSIS — I50.9 HEART FAILURE, NYHA CLASS 1 (HCC): ICD-10-CM

## 2017-06-05 DIAGNOSIS — I50.20 ACC/AHA STAGE C SYSTOLIC HEART FAILURE (HCC): ICD-10-CM

## 2017-06-05 DIAGNOSIS — I43 CARDIOMYOPATHY DUE TO HYPERTENSION, WITH HEART FAILURE (HCC): ICD-10-CM

## 2017-06-05 DIAGNOSIS — I11.0 CARDIOMYOPATHY DUE TO HYPERTENSION, WITH HEART FAILURE (HCC): ICD-10-CM

## 2017-06-05 PROCEDURE — 99214 OFFICE O/P EST MOD 30 MIN: CPT | Performed by: NURSE PRACTITIONER

## 2017-06-05 ASSESSMENT — ENCOUNTER SYMPTOMS
SHORTNESS OF BREATH: 0
PND: 0
PALPITATIONS: 1
DIZZINESS: 0
ABDOMINAL PAIN: 0
FEVER: 0
COUGH: 0
MYALGIAS: 0
ORTHOPNEA: 0
CLAUDICATION: 0

## 2017-06-05 NOTE — MR AVS SNAPSHOT
"Otoniel Wing   2017 10:00 AM   Office Visit   MRN: [id number]    Department:  [facility]   Dept Phone:  [phone]    Description:  Male : 1990   Provider:  DOMINIQUE Garcia           Reason for Visit     Follow-Up           Allergies as of 2017     Allergen Noted Reactions    Bloodless 2014       Vancomycin 10/26/2015   Itching    Pt states itching.      You were diagnosed with     ACC/AHA stage C systolic heart failure (CMS-HCC)   [4138923]       Cardiomyopathy due to hypertension, with heart failure (CMS-HCC)   [0735216]       Heart failure, NYHA class 1 (CMS-HCC)   [609336]       ESRD (end stage renal disease) (CMS-HCC)   [110779]         Vital Signs     Blood Pressure Pulse Height Weight Body Mass Index Oxygen Saturation    142/88 mmHg 82 1.702 m (5' 7\") 62.143 kg (137 lb) 21.45 kg/m2 98%    Smoking Status                   Former Smoker           Basic Information     Date Of Birth Sex Race Ethnicity Preferred Language    1990 Male  or   Origin (Georgian,Anguillan,Maltese,[place] citizen, etc) English      Problem List              ICD-10-CM Priority Class Noted - Resolved    Cystic acne L70.0   2009 - Present    Renal failure N19   2009 - Present    Dialysis patient (CMS-HCC) Z99.2   2012 - Present    HTN (hypertension) I10   2012 - Present    ESRD (end stage renal disease) (CMS-HCC) N18.6   10/28/2013 - Present    CHF (congestive heart failure) (CMS-HCC) I50.9   2014 - Present    Pulmonary edema J81.1   2014 - Present    Anemia of chronic renal failure N18.9, D63.1   2014 - Present    Chest pain R07.9 High  2014 - Present    End stage renal disease (CMS-HCC) N18.6   2014 - Present    Depressive disorder F32.9   2016 - Present    Cardiomyopathy due to hypertension, with heart failure (CMS-HCC) ef 30% I11.0   3/2/2017 - Present    ACC/AHA stage C systolic heart failure (CMS-HCC) I50.20 "   4/14/2017 - Present      Health Maintenance        Date Due Completion Dates    IMM DTaP/Tdap/Td Vaccine (1 - Tdap) 5/23/2007 ---    IMM PNEUMOCOCCAL 19-64 (ADULT) HIGHEST RISK SERIES (3 of 3 - PPSV23) 4/26/2017 3/1/2017, 11/11/2009            Current Immunizations     13-VALENT PCV PREVNAR 3/1/2017  5:45 AM    Influenza TIV (IM) 10/2/2016, 10/9/2015, 10/18/2014, 11/13/2013    Pneumococcal polysaccharide vaccine (PPSV-23) 11/11/2009 11:15 AM      Below and/or attached are the medications your provider expects you to take. Review all of your home medications and newly ordered medications with your provider and/or pharmacist. Follow medication instructions as directed by your provider and/or pharmacist. Please keep your medication list with you and share with your provider. Update the information when medications are discontinued, doses are changed, or new medications (including over-the-counter products) are added; and carry medication information at all times in the event of emergency situations     Allergies:  BLOODLESS - (reactions not documented)     VANCOMYCIN - Itching               Medications  Valid as of: June 05, 2017 - 10:29 AM    Generic Name Brand Name Tablet Size Instructions for use    Calcitriol (Cap) ROCALTROL 0.5 MCG Take 1 Cap by mouth 2 Times a Day.        Calcium Carbonate Antacid (Chew Tab) Calcium Carbonate Antacid 1000 MG Take 4,000 mg by mouth every 6 hours.        Carvedilol (Tab) COREG 6.25 MG Take 1 Tab by mouth 2 times a day, with meals.        Lisinopril (Tab) PRINIVIL 5 MG Take 1 Tab by mouth every day.        .                 Medicines prescribed today were sent to:     [facility] [place] - [address] [place] [address] [facility] [place] [address]    Phone: [phone] Fax: [phone]    Open 24 Hours?: No      Medication refill instructions:       If your prescription bottle indicates you have medication refills left, it is not necessary to call your provider’s  office. Please contact your pharmacy and they will refill your medication.    If your prescription bottle indicates you do not have any refills left, you may request refills at any time through one of the following ways: The online [employer] system (except Urgent Care), by calling your provider’s office, or by asking your pharmacy to contact your provider’s office with a refill request. Medication refills are processed only during regular business hours and may not be available until the next business day. Your provider may request additional information or to have a follow-up visit with you prior to refilling your medication.   *Please Note: Medication refills are assigned a new Rx number when refilled electronically. Your pharmacy may indicate that no refills were authorized even though a new prescription for the same medication is available at the pharmacy. Please request the medicine by name with the pharmacy before contacting your provider for a refill.        Your To Do List     Future Labs/Procedures Complete By [employer] W/O CONT  As directed 6/5/2018         [employer] Access Code: [email or web address]  Expires: 6/9/2017  1:23 PM    [employer]  A secure, online tool to manage your health information     [employer]’s [employer]® is a secure, online tool that connects you to your personalized health information from the privacy of your home -- day or night - making it very easy for you to manage your healthcare. Once the activation process is completed, you can even access your medical information using the [employer] joshua, which is available for free in the Apple Joshua store or Google Play store.     [employer] provides the following levels of access (as shown below):   My Chart Features   [facility]  Specialists [facility]  Urgent  Care Non-[facility]  Primary Care  Doctor   Email your healthcare team securely and privately 24/7 X X X    Manage appointments: schedule your next appointment;  view details of past/upcoming appointments X      Request prescription refills. X      View recent personal medical records, including lab and immunizations X X X X   View health record, including health history, allergies, medications X X X X   Read reports about your outpatient visits, procedures, consult and ER notes X X X X   See your discharge summary, which is a recap of your hospital and/or ER visit that includes your diagnosis, lab results, and care plan. X X       How to register for [email or web address]:  1. Go to  [email or web address].  2. Click on the Sign Up Now box, which takes you to the New Member Sign Up page. You will need to provide the following information:  a. Enter your [email or web address] Access Code exactly as it appears at the top of this page. (You will not need to use this code after you’ve completed the sign-up process. If you do not sign up before the expiration date, you must request a new code.)   b. Enter your date of birth.   c. Enter your home email address.   d. Click Submit, and follow the next screen’s instructions.  3. Create a [email or web address] ID. This will be your [email or web address] login ID and cannot be changed, so think of one that is secure and easy to remember.  4. Create a [email or web address] password. You can change your password at any time.  5. Enter your Password Reset Question and Answer. This can be used at a later time if you forget your password.   6. Enter your e-mail address. This allows you to receive e-mail notifications when new information is available in [email or web address].  7. Click Sign Up. You can now view your health information.    For assistance activating your [email or web address] account, call [phone]

## 2017-06-05 NOTE — PROGRESS NOTES
Subjective:   Otoniel Wing is a 27 y.o. male who presents today for follow up on his heart failure.    Patient of Dr. Godinez. Patient was last seen 4/14/17. Pt has ESRD on dialysis -Tues/Thurs/Sat. Pt had no medication adjustments for his heart failure meds from his last visit. Pt continues on Carvedilol 6.25 mg BID and lisinopril 5 mg daily. Pt has been feeling better since his last visit. Patient denies chest pain, shortness of breath with rest ,ADLs, Exertion, palpitations, dizziness/lightheadedness, orthopnea, PND or Edema.      Pt does not weigh himself regularly.   Past Medical History   Diagnosis Date   • Changing skin lesion 8/17/2009   • Renal disorder L arm fistula   • Renal failure    • Hypertension    • CHF (congestive heart failure) (CMS-[facility]) 1/27/2014   • Indigestion    • Anesthesia      PONV-non recently   • Encounter for renal dialysis      Tues, Thurs, Sat   • History of anemia      due to renal function   • Pneumonia 8/2015   • Breath shortness      with chf exacerbation/fluid overload   • Chest pain 11/18/2014   • Pain      bone pain due to parathyroid     Past Surgical History   Procedure Laterality Date   • Av fistula creation  2005     left arm   • Av fistula revision  10/13/2009     Performed by ANSELMO FELIX at SURGERY [facility]   • Cath placement  10/13/2009     Performed by ANSELMO FELIX at SURGERY [facility]   • Appendectomy laparoscopic  11/10/2009     Performed by VLADISLAV PALACIO at SURGERY [facility]   • Av fistula revision  10/28/2013     Performed by Anselmo Felix M.D. at SURGERY [facility]   • Av fistula creation  12/22/2014     Performed by Anselmo Felix M.D. at SURGERY [facility]   • Parathyroid exploration  8/10/2016     Procedure: PARATHYROID EXPLORATION with BILATERAL NIMS NERVE monitoring and cervical thymectomy ;  Surgeon: Topher Ramirez M.D.;  Location: SURGERY SAME DAY [facility];  Service:      Family History   Problem  "Relation Age of Onset   • Depression Mother      History   Smoking status   • Former Smoker -- 0.25 packs/day for 8 years   • Quit date: 11/28/2016   Smokeless tobacco   • Never Used     Comment: Smokes 6 cigarettes daily     Allergies   Allergen Reactions   • Bloodless    • Vancomycin Itching     Pt states itching.     Outpatient Encounter Prescriptions as of 6/5/2017   Medication Sig Dispense Refill   • carvedilol (COREG) 6.25 MG Tab Take 1 Tab by mouth 2 times a day, with meals. 60 Tab 11   • lisinopril (PRINIVIL) 5 MG Tab Take 1 Tab by mouth every day. 30 Tab 3   • calcitRIOL (ROCALTROL) 0.5 MCG Cap Take 1 Cap by mouth 2 Times a Day. 60 Cap 3   • Calcium Carbonate Antacid 1000 MG Chew Tab Take 4,000 mg by mouth every 6 hours.     • [DISCONTINUED] benzonatate (TESSALON) 100 MG Cap Take 2 Caps by mouth 2 times a day as needed for Cough. 30 Cap 0     No facility-administered encounter medications on file as of 6/5/2017.     Review of Systems   Constitutional: Negative for fever and malaise/fatigue.   Respiratory: Negative for cough and shortness of breath.    Cardiovascular: Positive for palpitations (feelings of palpations during dialysis). Negative for chest pain, orthopnea, claudication, leg swelling and PND.   Gastrointestinal: Negative for abdominal pain.   Musculoskeletal: Negative for myalgias.   Neurological: Negative for dizziness.   All other systems reviewed and are negative.       Objective:   /88 mmHg  Pulse 82  Ht 1.702 m (5' 7\")  Wt 62.143 kg (137 lb)  BMI 21.45 kg/m2  SpO2 98%    Physical Exam   Constitutional: He is oriented to person, place, and time. He appears well-developed and well-nourished.   HENT:   Head: Normocephalic and atraumatic.   Eyes: EOM are normal.   Neck: Normal range of motion. Neck supple. No JVD present.   Cardiovascular: Normal rate, regular rhythm, normal heart sounds and intact distal pulses.    No murmur heard.  Pulmonary/Chest: Effort normal and breath sounds " normal. No respiratory distress. He has no wheezes. He has no rales.   Abdominal: Soft. Bowel sounds are normal.   Musculoskeletal: Normal range of motion. He exhibits no edema.   Neurological: He is alert and oriented to person, place, and time.   Skin: Skin is warm and dry.   Left Upper Arm fistula positive Bruit and Thrill.   Psychiatric: He has a normal mood and affect.   Nursing note and vitals reviewed.    Lab Results   Component Value Date/Time    CHOLESTEROL, 09/05/2013 02:42 PM    * 09/05/2013 02:42 PM    HDL 34* 09/05/2013 02:42 PM    TRIGLYCERIDES 48 09/05/2013 02:42 PM       Lab Results   Component Value Date/Time    SODIUM 136 03/02/2017 03:25 AM    POTASSIUM 4.9 03/02/2017 03:25 AM    CHLORIDE 93* 03/02/2017 03:25 AM    CO2 31 03/02/2017 03:25 AM    GLUCOSE 93 03/02/2017 03:25 AM    BUN 23* 03/02/2017 03:25 AM    CREATININE 5.37* 03/02/2017 03:25 AM     Lab Results   Component Value Date/Time    ALKALINE PHOSPHATASE 167* 02/28/2017 10:10 AM    AST(SGOT) 12 02/28/2017 10:10 AM    ALT(SGPT) 12 02/28/2017 10:10 AM    TOTAL BILIRUBIN 0.6 02/28/2017 10:10 AM      Transthoracic Echo Report 3/2/17  Severely reduced left ventricular systolic function.  Normal left ventricular wall thickness.  Left ventricle is mildly dilated.  Aortic sclerosis without stenosis.  Moderate eccentric tricuspid regurgitation.  Moderate eccentric tricuspid regurgitation.  Right ventricular systolic pressure is estimated to be 35 mmHg.  Compared to the images of the study done 11/19/14 - there has been   worsening of left ventricular ejection fraction.     Assessment:     1. ACC/AHA stage C systolic heart failure (CMS-[facility])  ECHOCARDIOGRAM LTD W/O CONT   2. Cardiomyopathy due to hypertension, with heart failure (CMS-[facility]) ef 30%  ECHOCARDIOGRAM LTD W/O CONT   3. Heart failure, NYHA class 1 (CMS-[facility])  ECHOCARDIOGRAM LTD W/O CONT   4. ESRD (end stage renal disease) (CMS-[facility])         Medical Decision Making:  Today's  Assessment / Status / Plan:   1. HFrEF, Stage C, Class 1: Based on physical examination findings, patient is euvolemic. No JVD, lungs are clear to auscultation, no pitting edema in bilateral lower extremities, no ascites.   -Continue Carvedilol 6.25 mg BID (no med adjustment d/t pt getting dialysis, he is due tomorrow)  -Continue Lisinopril 5 mg Daily  -No Spironolactone d/t Kidney disease  -Reinforced s/sx of worsening heart failure with patient and weight monitoring. Pt verbalizes understanding. Pt to call office or RTC if present.   -Repeat ECHO in 1 month     2, ESRD:  -continue dialysis and follow up with Neph.    FU in clinic in 1 month after Echo with Dr. Godinez. Sooner if needed.    Patient verbalizes understanding and agrees with the plan of care.     Collaborating MD: Alfredo Godinez MD

## 2017-06-29 DIAGNOSIS — I10 ESSENTIAL HYPERTENSION: ICD-10-CM

## 2017-06-29 RX ORDER — LISINOPRIL 5 MG/1
5 TABLET ORAL DAILY
Qty: 90 TAB | Refills: 3 | Status: SHIPPED | OUTPATIENT
Start: 2017-06-29 | End: 2018-05-05 | Stop reason: SDUPTHER

## 2017-07-05 ENCOUNTER — HOSPITAL ENCOUNTER (OUTPATIENT)
Dept: CARDIOLOGY | Facility: MEDICAL CENTER | Age: 27
End: 2017-07-05
Attending: NURSE PRACTITIONER
Payer: COMMERCIAL

## 2017-07-05 DIAGNOSIS — I50.20 ACC/AHA STAGE C SYSTOLIC HEART FAILURE (HCC): ICD-10-CM

## 2017-07-05 DIAGNOSIS — I11.0 CARDIOMYOPATHY DUE TO HYPERTENSION, WITH HEART FAILURE (HCC): ICD-10-CM

## 2017-07-05 DIAGNOSIS — I50.9 HEART FAILURE, NYHA CLASS 1 (HCC): ICD-10-CM

## 2017-07-05 DIAGNOSIS — I43 CARDIOMYOPATHY DUE TO HYPERTENSION, WITH HEART FAILURE (HCC): ICD-10-CM

## 2017-07-05 LAB
LV EJECT FRACT  99904: 40
LV EJECT FRACT MOD 2C 99903: 43.52
LV EJECT FRACT MOD 4C 99902: 53.54
LV EJECT FRACT MOD BP 99901: 54.56

## 2017-07-05 PROCEDURE — 93308 TTE F-UP OR LMTD: CPT

## 2017-07-05 PROCEDURE — 93308 TTE F-UP OR LMTD: CPT | Mod: 26 | Performed by: INTERNAL MEDICINE

## 2017-07-29 ENCOUNTER — HOSPITAL ENCOUNTER (INPATIENT)
Facility: MEDICAL CENTER | Age: 27
LOS: 3 days | DRG: 100 | End: 2017-08-01
Attending: EMERGENCY MEDICINE | Admitting: HOSPITALIST
Payer: COMMERCIAL

## 2017-07-29 ENCOUNTER — RESOLUTE PROFESSIONAL BILLING HOSPITAL PROF FEE (OUTPATIENT)
Dept: HOSPITALIST | Facility: MEDICAL CENTER | Age: 27
End: 2017-07-29
Payer: COMMERCIAL

## 2017-07-29 DIAGNOSIS — N19 RENAL FAILURE: ICD-10-CM

## 2017-07-29 DIAGNOSIS — R56.9 SEIZURE (HCC): ICD-10-CM

## 2017-07-29 DIAGNOSIS — S00.512A ABRASION OF TONGUE, INITIAL ENCOUNTER: ICD-10-CM

## 2017-07-29 PROBLEM — N18.6 ESRD ON HEMODIALYSIS (HCC): Status: ACTIVE | Noted: 2017-07-29

## 2017-07-29 PROBLEM — G40.409 GRAND MAL SEIZURE (HCC): Status: ACTIVE | Noted: 2017-07-29

## 2017-07-29 PROBLEM — I10 HYPERTENSION: Status: ACTIVE | Noted: 2017-07-29

## 2017-07-29 PROBLEM — Z99.2 ESRD ON HEMODIALYSIS (HCC): Status: ACTIVE | Noted: 2017-07-29

## 2017-07-29 LAB
ALBUMIN SERPL BCP-MCNC: 4 G/DL (ref 3.2–4.9)
ALBUMIN/GLOB SERPL: 1 G/DL
ALP SERPL-CCNC: 58 U/L (ref 30–99)
ALT SERPL-CCNC: 11 U/L (ref 2–50)
ANION GAP SERPL CALC-SCNC: 25 MMOL/L (ref 0–11.9)
AST SERPL-CCNC: 12 U/L (ref 12–45)
BASOPHILS # BLD AUTO: 0.4 % (ref 0–1.8)
BASOPHILS # BLD: 0.02 K/UL (ref 0–0.12)
BILIRUB SERPL-MCNC: 0.5 MG/DL (ref 0.1–1.5)
BUN SERPL-MCNC: 39 MG/DL (ref 8–22)
CA-I SERPL-SCNC: 1.1 MMOL/L (ref 1.1–1.3)
CALCIUM SERPL-MCNC: 9.7 MG/DL (ref 8.5–10.5)
CHLORIDE SERPL-SCNC: 92 MMOL/L (ref 96–112)
CO2 SERPL-SCNC: 20 MMOL/L (ref 20–33)
CREAT SERPL-MCNC: 6.64 MG/DL (ref 0.5–1.4)
EKG IMPRESSION: NORMAL
EOSINOPHIL # BLD AUTO: 0.06 K/UL (ref 0–0.51)
EOSINOPHIL NFR BLD: 1.3 % (ref 0–6.9)
ERYTHROCYTE [DISTWIDTH] IN BLOOD BY AUTOMATED COUNT: 47.1 FL (ref 35.9–50)
ETHANOL BLD-MCNC: 0 G/DL
GFR SERPL CREATININE-BSD FRML MDRD: 10 ML/MIN/1.73 M 2
GLOBULIN SER CALC-MCNC: 4.2 G/DL (ref 1.9–3.5)
GLUCOSE SERPL-MCNC: 78 MG/DL (ref 65–99)
HCT VFR BLD AUTO: 29.7 % (ref 42–52)
HGB BLD-MCNC: 9.6 G/DL (ref 14–18)
IMM GRANULOCYTES # BLD AUTO: 0.01 K/UL (ref 0–0.11)
IMM GRANULOCYTES NFR BLD AUTO: 0.2 % (ref 0–0.9)
LYMPHOCYTES # BLD AUTO: 0.97 K/UL (ref 1–4.8)
LYMPHOCYTES NFR BLD: 20.4 % (ref 22–41)
MAGNESIUM SERPL-MCNC: 2.2 MG/DL (ref 1.5–2.5)
MAGNESIUM SERPL-MCNC: 2.2 MG/DL (ref 1.5–2.5)
MCH RBC QN AUTO: 30.7 PG (ref 27–33)
MCHC RBC AUTO-ENTMCNC: 32.3 G/DL (ref 33.7–35.3)
MCV RBC AUTO: 94.9 FL (ref 81.4–97.8)
MONOCYTES # BLD AUTO: 0.24 K/UL (ref 0–0.85)
MONOCYTES NFR BLD AUTO: 5 % (ref 0–13.4)
NEUTROPHILS # BLD AUTO: 3.46 K/UL (ref 1.82–7.42)
NEUTROPHILS NFR BLD: 72.7 % (ref 44–72)
NRBC # BLD AUTO: 0 K/UL
NRBC BLD AUTO-RTO: 0 /100 WBC
PHOSPHATE SERPL-MCNC: 5.1 MG/DL (ref 2.5–4.5)
PLATELET # BLD AUTO: 180 K/UL (ref 164–446)
PMV BLD AUTO: 9.8 FL (ref 9–12.9)
POTASSIUM SERPL-SCNC: 3.9 MMOL/L (ref 3.6–5.5)
PROT SERPL-MCNC: 8.2 G/DL (ref 6–8.2)
RBC # BLD AUTO: 3.13 M/UL (ref 4.7–6.1)
SODIUM SERPL-SCNC: 137 MMOL/L (ref 135–145)
TSH SERPL DL<=0.005 MIU/L-ACNC: 0.92 UIU/ML (ref 0.3–3.7)
WBC # BLD AUTO: 4.8 K/UL (ref 4.8–10.8)

## 2017-07-29 PROCEDURE — 700102 HCHG RX REV CODE 250 W/ 637 OVERRIDE(OP): Performed by: HOSPITALIST

## 2017-07-29 PROCEDURE — 36415 COLL VENOUS BLD VENIPUNCTURE: CPT

## 2017-07-29 PROCEDURE — 770020 HCHG ROOM/CARE - TELE (206)

## 2017-07-29 PROCEDURE — 85025 COMPLETE CBC W/AUTO DIFF WBC: CPT

## 2017-07-29 PROCEDURE — 99285 EMERGENCY DEPT VISIT HI MDM: CPT

## 2017-07-29 PROCEDURE — 99223 1ST HOSP IP/OBS HIGH 75: CPT | Performed by: HOSPITALIST

## 2017-07-29 PROCEDURE — 80307 DRUG TEST PRSMV CHEM ANLYZR: CPT

## 2017-07-29 PROCEDURE — 80053 COMPREHEN METABOLIC PANEL: CPT

## 2017-07-29 PROCEDURE — 82330 ASSAY OF CALCIUM: CPT

## 2017-07-29 PROCEDURE — 93005 ELECTROCARDIOGRAM TRACING: CPT

## 2017-07-29 PROCEDURE — A9270 NON-COVERED ITEM OR SERVICE: HCPCS | Performed by: HOSPITALIST

## 2017-07-29 PROCEDURE — 84100 ASSAY OF PHOSPHORUS: CPT

## 2017-07-29 PROCEDURE — 83735 ASSAY OF MAGNESIUM: CPT

## 2017-07-29 PROCEDURE — 84443 ASSAY THYROID STIM HORMONE: CPT

## 2017-07-29 RX ORDER — HEPARIN SODIUM 5000 [USP'U]/ML
5000 INJECTION, SOLUTION INTRAVENOUS; SUBCUTANEOUS EVERY 8 HOURS
Status: DISCONTINUED | OUTPATIENT
Start: 2017-07-30 | End: 2017-08-01 | Stop reason: HOSPADM

## 2017-07-29 RX ORDER — LORAZEPAM 1 MG/1
0.5 TABLET ORAL EVERY 6 HOURS PRN
Status: DISCONTINUED | OUTPATIENT
Start: 2017-07-29 | End: 2017-08-01 | Stop reason: HOSPADM

## 2017-07-29 RX ORDER — POLYETHYLENE GLYCOL 3350 17 G/17G
1 POWDER, FOR SOLUTION ORAL
Status: DISCONTINUED | OUTPATIENT
Start: 2017-07-29 | End: 2017-08-01 | Stop reason: HOSPADM

## 2017-07-29 RX ORDER — PROMETHAZINE HYDROCHLORIDE 25 MG/1
12.5-25 SUPPOSITORY RECTAL EVERY 4 HOURS PRN
Status: DISCONTINUED | OUTPATIENT
Start: 2017-07-29 | End: 2017-08-01 | Stop reason: HOSPADM

## 2017-07-29 RX ORDER — CARVEDILOL 6.25 MG/1
6.25 TABLET ORAL 2 TIMES DAILY WITH MEALS
Status: DISCONTINUED | OUTPATIENT
Start: 2017-07-29 | End: 2017-08-01 | Stop reason: HOSPADM

## 2017-07-29 RX ORDER — ONDANSETRON 2 MG/ML
4 INJECTION INTRAMUSCULAR; INTRAVENOUS EVERY 4 HOURS PRN
Status: DISCONTINUED | OUTPATIENT
Start: 2017-07-29 | End: 2017-08-01 | Stop reason: HOSPADM

## 2017-07-29 RX ORDER — ONDANSETRON 4 MG/1
4 TABLET, ORALLY DISINTEGRATING ORAL EVERY 4 HOURS PRN
Status: DISCONTINUED | OUTPATIENT
Start: 2017-07-29 | End: 2017-08-01 | Stop reason: HOSPADM

## 2017-07-29 RX ORDER — PROMETHAZINE HYDROCHLORIDE 25 MG/1
12.5-25 TABLET ORAL EVERY 4 HOURS PRN
Status: DISCONTINUED | OUTPATIENT
Start: 2017-07-29 | End: 2017-08-01 | Stop reason: HOSPADM

## 2017-07-29 RX ORDER — ACETAMINOPHEN 325 MG/1
650 TABLET ORAL EVERY 6 HOURS PRN
Status: DISCONTINUED | OUTPATIENT
Start: 2017-07-29 | End: 2017-08-01 | Stop reason: HOSPADM

## 2017-07-29 RX ORDER — LORAZEPAM 2 MG/ML
0.5 INJECTION INTRAMUSCULAR EVERY 6 HOURS PRN
Status: DISCONTINUED | OUTPATIENT
Start: 2017-07-29 | End: 2017-08-01 | Stop reason: HOSPADM

## 2017-07-29 RX ORDER — CINACALCET 60 MG/1
60 TABLET, FILM COATED ORAL DAILY
COMMUNITY
End: 2017-11-28

## 2017-07-29 RX ORDER — AMOXICILLIN 250 MG
2 CAPSULE ORAL 2 TIMES DAILY
Status: DISCONTINUED | OUTPATIENT
Start: 2017-07-29 | End: 2017-08-01 | Stop reason: HOSPADM

## 2017-07-29 RX ORDER — BISACODYL 10 MG
10 SUPPOSITORY, RECTAL RECTAL
Status: DISCONTINUED | OUTPATIENT
Start: 2017-07-29 | End: 2017-08-01 | Stop reason: HOSPADM

## 2017-07-29 RX ORDER — CALCIUM CARBONATE 500 MG/1
4000 TABLET, CHEWABLE ORAL EVERY 6 HOURS
Status: DISCONTINUED | OUTPATIENT
Start: 2017-07-29 | End: 2017-08-01 | Stop reason: HOSPADM

## 2017-07-29 RX ORDER — CINACALCET 30 MG/1
30 TABLET, FILM COATED ORAL DAILY
COMMUNITY
End: 2017-07-29

## 2017-07-29 RX ORDER — DEXTROSE MONOHYDRATE 25 G/50ML
25 INJECTION, SOLUTION INTRAVENOUS
Status: DISCONTINUED | OUTPATIENT
Start: 2017-07-29 | End: 2017-08-01 | Stop reason: HOSPADM

## 2017-07-29 RX ORDER — CALCITRIOL 0.5 UG/1
0.5 CAPSULE, LIQUID FILLED ORAL 2 TIMES DAILY
Status: DISCONTINUED | OUTPATIENT
Start: 2017-07-29 | End: 2017-08-01 | Stop reason: HOSPADM

## 2017-07-29 RX ORDER — LISINOPRIL 5 MG/1
5 TABLET ORAL DAILY
Status: DISCONTINUED | OUTPATIENT
Start: 2017-07-30 | End: 2017-08-01 | Stop reason: HOSPADM

## 2017-07-29 RX ADMIN — CALCITRIOL 0.5 MCG: 0.5 CAPSULE, LIQUID FILLED ORAL at 21:24

## 2017-07-29 RX ADMIN — ANTACID TABLETS 4000 MG: 500 TABLET, CHEWABLE ORAL at 21:25

## 2017-07-29 RX ADMIN — CARVEDILOL 6.25 MG: 6.25 TABLET, FILM COATED ORAL at 21:24

## 2017-07-29 ASSESSMENT — COGNITIVE AND FUNCTIONAL STATUS - GENERAL
MOBILITY SCORE: 24
SUGGESTED CMS G CODE MODIFIER DAILY ACTIVITY: CH
SUGGESTED CMS G CODE MODIFIER MOBILITY: CH
DAILY ACTIVITIY SCORE: 24

## 2017-07-29 ASSESSMENT — PAIN SCALES - GENERAL
PAINLEVEL_OUTOF10: 4
PAINLEVEL_OUTOF10: 0

## 2017-07-29 ASSESSMENT — COPD QUESTIONNAIRES
DURING THE PAST 4 WEEKS HOW MUCH DID YOU FEEL SHORT OF BREATH: NONE/LITTLE OF THE TIME
HAVE YOU SMOKED AT LEAST 100 CIGARETTES IN YOUR ENTIRE LIFE: YES
COPD SCREENING SCORE: 2
DO YOU EVER COUGH UP ANY MUCUS OR PHLEGM?: NO/ONLY WITH OCCASIONAL COLDS OR INFECTIONS

## 2017-07-29 ASSESSMENT — PATIENT HEALTH QUESTIONNAIRE - PHQ9
SUM OF ALL RESPONSES TO PHQ9 QUESTIONS 1 AND 2: 0
1. LITTLE INTEREST OR PLEASURE IN DOING THINGS: NOT AT ALL
2. FEELING DOWN, DEPRESSED, IRRITABLE, OR HOPELESS: NOT AT ALL
SUM OF ALL RESPONSES TO PHQ QUESTIONS 1-9: 0

## 2017-07-29 ASSESSMENT — LIFESTYLE VARIABLES
ALCOHOL_USE: NO
EVER_SMOKED: YES

## 2017-07-29 NOTE — ED NOTES
The Medication Reconciliation process has been completed by interviewing the patient    Allergies have been reviewed  Antibiotic use in 30 days - none    Home Pharmacy:  [facility] [place]

## 2017-07-29 NOTE — PROGRESS NOTES
Received call from PALMIRA Escalante pt came to ER with needles still in lt ua avf. Requested by RN to remove it. This RN removed LT UA AVF needles and hold for 10 mins. Dressing and tapes applied. No sign bleeding. Report to TEJAS Freeman RN. Nursing time charged 30 mins.

## 2017-07-29 NOTE — IP AVS SNAPSHOT
8/1/2017    Otoniel Wing  [address]  [place] [address]    Dear Otoniel:    Replaced by [facility] wants to ensure your discharge home is safe and you or your loved ones have had all of your questions answered regarding your care after you leave the hospital.    Below is a list of resources and contact information should you have any questions regarding your hospital stay, follow-up instructions, or active medical symptoms.    Questions or Concerns Regarding… Contact   Medical Questions Related to Your Discharge  (7 days a week, 8am-5pm) Contact a Nurse Care Coordinator   [phone]   Medical Questions Not Related to Your Discharge  (24 hours a day / 7 days a week)  Contact the Nurse Health Line   [phone]    Medications or Discharge Instructions Refer to your discharge packet   or contact your [facility] Primary Care Provider   [phone]   Follow-up Appointment(s) Schedule your appointment via [employer]   or contact Scheduling [phone]   Billing Review your statement via [employer]  or contact Billing [phone]   Medical Records Review your records via [employer]   or contact Medical Records [phone]     You may receive a telephone call within two days of discharge. This call is to make certain you understand your discharge instructions and have the opportunity to have any questions answered. You can also easily access your medical information, test results and upcoming appointments via the [employer] free online health management tool. You can learn more and sign up at [employer]. For assistance setting up your [employer] account, please call [phone].    Once again, we want to ensure your discharge home is safe and that you have a clear understanding of any next steps in your care. If you have any questions or concerns, please do not hesitate to contact us, we are here for you. Thank you for choosing [facility] for your healthcare needs.    Sincerely,    Your [facility]  Team

## 2017-07-29 NOTE — CONSULTS
[facility] NEPHROLOGY CONSULTATION  Consulting MD: Ti Rubin      Reason for Consultation: Management of ESRD    HPI  26 yo HM -- ESRD on Tue/Thu/Sat dialysis schedule.  While at dialysis, almost done, had witnessed grand mal seizure with loss of consciousness and tongue biting.  Sent to [facility] ER for evaluation.      Had prior similar seizure, no treatment recommended.  Now with second seizure.  Not typical to get grand mal seizures during dialysis.  (this is only patient I have seen have one on dialysis in 27 years).    Doubt electrolyte imbalance related.    While in ER had complete resolution of symptoms.  BP ok.  Neurology evaluation underway.  Consult to manage ESRD    As noted, patient had a near complete treatment.  Labs look ok.    PAST MEDICAL HISTORY:  1.  ESRD, TTS IHD.  2.  Hypertension.  3.  Anemia of CKD.  4.  Renal osteodystrophy.  5.  One prior seizure - negative work up - attributed to electrolyte imbalance    PAST SURGICAL HISTORY:  1.  Appendectomy 2010.  2.  Left AV fistula 2005.  3.  Multiple revisions and resections on left AV fistula.  4.  Parathyroidectomy.    FAMILY HISTORY:  1.  Negative for any kind of CKD or ESRD.  2.  Mother had hypertension and diabetes.  3.  Father was murdered.  4.  Siblings are in good health.    SOCIAL HISTORY:  1.  Single, never  and works as a .  2.  He drinks socially approximately 3 times a week.  3.  Smokes cigarettes occasionally with friends, but nothing regularly.  4.  Denies any illicit drug use.    HOME MEDICATIONS:  Reviewed and documented in chart.    ALLERGIES:  VANCOMYCIN CAUSES ITCHING.    ROS  No shortness of breath, cough,  No chest pain or palpitations.  No nausea, no vomiting, no abdominal pain, no dysuria or  hematuria.  No leg swelling.  No skin rash.  No headache.  No focal weakness    EXAM  Constitutional: Cooperative, Friendly, Patient is awake and alert. No acute respiratory distress. Well  "developed, Well nourished, Non-toxic appearance.  HENT: Normocephalic, Atraumatic, 2 abrasions on each side of his tongue but no lacerations requiring sutures  Eyes: Sclera and conjunctiva clear  Neck:  Non-tender   Cardiovascular: Heart is regular rate and rhythm no murmur, rub or thrill.    Lungs: . No wheezing or crackles. No respiratory distress  Abdomen: Soft, No tenderness  Bowel sounds are present.  Skin: + AVF with needles still in fistula  Back: Non tender with palpation, No CVA tenderness.    Extremities: No edema. Non tender. Left arm has AVF    Labs (CBC): Last 72 Hours  (Last 3 results in the past 72 hours)             07/29/17   0955          WBC  4.8        Neutrophils-Polys  72.70        Basophils  0.40        RBC  3.13        Hemoglobin  9.6        Hematocrit  29.7        MCV  94.9        MCH  30.7        MCHC  32.3        Platelet Count  180        RDW  47.1        MPV  9.8        Neutrophils (Absolute)  3.46        Lymphs (Absolute)  0.97                   - Comment  - Low  - High            Labs (Metabolic): Last 72 Hours  (Last 3 results in the past 72 hours)             07/29/17   0955          Sodium          Potassium          Chloride          Co2          Glucose          Bun          Creatinine          Calcium          AST(SGOT)          ALT(SGPT)          Alkaline Phosphatase          Total Bilirubin          Albumin          Total Protein          Globulin          A-G Ratio          GFR If African American 60 mL/min/1.73 m 2  Lab: M  Abnormal\" class=\"rz_1s\" style=\"padding-left: 2px;\"> 60 mL/min/1.73 m 2  Lab: M  Abnormal\" class=\"rz_1u\" style=\"padding-left: 3px;\">12        GFR If Non African American 60 mL/min/1.73 m 2  Lab: M  Abnormal\" class=\"rz_1s\" style=\"padding-left: 2px;\"> 60 mL/min/1.73 m 2  Lab: M  Abnormal\" class=\"rz_1u\" style=\"padding-left: 3px;\">10        Anion Gap                     - Abnormal              Labs (Additional): Last 72 Hours  (Last 3 results in the past 72 " hours)             07/29/17   0955          INR          Troponin I          Magnesium  2.2        Phosphorus          Lactic Acid          Procalcitonin              IMPRESSION/PLAN    # ESRD  -- s/p adequate dialysis today  -- no additional plan for dialysis today  -- maintain on renal diet  -- no BP/IVs or labs on dialysis arm  -- assume eGFR <10 when dosing all meds  -- monitor chems/hgb routinely  -- have dialysis nurse remove AVF needles    # SEIZURE  -- atypical for dialysis patient - syncope common but seizures not  -- work up and management per primary team/neurology    # ANEMIA  -- chronic due to ESRD  -- no overt bleeding  -- manage with EPOGEN and prn Transfusion

## 2017-07-29 NOTE — ED PROVIDER NOTES
ED Provider Note    Scribed for Prabhu Tinajero M.D. by Rox Montanez. 7/29/2017  10:09 AM    Primary Care Provider: None  Means of arrival: Ambulance  History obtained for: Patient  History limited by: None    CHIEF COMPLAINT  Chief Complaint   Patient presents with   • Seizure       HPI  Otoniel Wing is a 27 y.o. male who presents to the ED by ambulance for a seizure with an onset of today. History of renal failure for 11 years treated with dialysis on Tuesday, Thursday and Saturday.  Patient had 15 minutes left of his 3 hour dialysis today when he experienced a witnessed seizure for 90 seconds. Positive tongue laceration. At this time, he feels improved.  Negative for bowel or bladder incontinence, chest pain, shortness of breath, focal weakness to upper or lower extremities.  Patient missed his dialysis on Thursday. History of a single seizure approximately one year ago reported after dialysis secondary to an electrolyte imbalance.        REVIEW OF SYSTEMS  CONSTITUTIONAL:  Denies fever, chills, weight gain/loss or weakness.  EYES:  Denies photophobia or discharge.   ENT: Positive tongue laceration. Denies sore throat, nose, or ear pain.  CARDIOVASCULAR: Denies chest pain, palpitations or swelling.  RESPIRATORY: Denies cough, shortness of breath or difficulty breathing.  GI:  Denies abdominal pain, nausea, vomiting or diarrhea.  : Denies bowel or bladder incontinence.   MUSCULOSKELETAL:  Denies weakness, joint swelling or back pain.  SKIN:  No rash or bruising.  NEUROLOGIC:  Positive seizure witnessed by the dialysis nurses.  Denies headache, focal weakness or numbness.  PSYCHIATRIC:  Denies depression.    See HPI for further details.  C.      PAST MEDICAL HISTORY  Past Medical History   Diagnosis Date   • Changing skin lesion 8/17/2009   • Renal disorder L arm fistula   • Renal failure    • Hypertension    • CHF (congestive heart failure) (CMS-HCC) 1/27/2014   • Indigestion    • Anesthesia       PONV-non recently   • Encounter for renal dialysis      Tues, Thurs, Sat   • History of anemia      due to renal function   • Pneumonia 8/2015   • Breath shortness      with chf exacerbation/fluid overload   • Chest pain 11/18/2014   • Pain      bone pain due to parathyroid   History of a seizure one year ago.      FAMILY HISTORY  Family History   Problem Relation Age of Onset   • Depression Mother        SOCIAL HISTORY  Patient reports that he quit smoking about 7 months ago. He has never used smokeless tobacco. He reports that he drinks alcohol. He reports that he does not use illicit drugs.      SURGICAL HISTORY  Past Surgical History   Procedure Laterality Date   • Av fistula creation  2005     left arm   • Av fistula revision  10/13/2009     Performed by ANSELMO FELIX at SURGERY [facility]   • Cath placement  10/13/2009     Performed by ANSELMO FELIX at SURGERY [facility]   • Appendectomy laparoscopic  11/10/2009     Performed by VLADISLAV PALACIO at SURGERY [facility]   • Av fistula revision  10/28/2013     Performed by Anselmo Felix M.D. at SURGERY [facility]   • Av fistula creation  12/22/2014     Performed by Anselmo Felix M.D. at SURGERY [facility]   • Parathyroid exploration  8/10/2016     Procedure: PARATHYROID EXPLORATION with BILATERAL NIMS NERVE monitoring and cervical thymectomy ;  Surgeon: Topher Ramirez M.D.;  Location: SURGERY SAME DAY [facility];  Service:        CURRENT MEDICATIONS  Home Medications     Reviewed by Octavio Gage (Pharmacy Tech) on 07/29/17 at 1206  Med List Status: Complete    Medication Last Dose Status    calcitRIOL (ROCALTROL) 0.5 MCG Cap 7/28/2017 Active    Calcium Carbonate Antacid 1000 MG Chew Tab 7/28/2017 Active    carvedilol (COREG) 6.25 MG Tab 7/28/2017 Active    Cinacalcet HCl 60 MG Tab 7/28/2017 Active    lisinopril (PRINIVIL) 5 MG Tab 7/28/2017 Active                ALLERGIES  Allergies   Allergen Reactions   • Bloodless  "   • Vancomycin Itching     Pt states itching.       PHYSICAL EXAM  VITAL SIGNS: /71 mmHg  Pulse 116  Temp(Src) 37.1 °C (98.8 °F)  Resp 17  Ht 1.727 m (5' 8\")  Wt 59.5 kg (131 lb 2.8 oz)  BMI 19.95 kg/m2       Constitutional: Cooperative, Friendly, Patient is awake and alert. No acute respiratory distress. Well developed, Well nourished, Non-toxic appearance.  HENT: Normocephalic, Atraumatic, Bilateral external ears normal, Oropharynx pink moist with no exudates, 2 abrasions on each side of his tongue but no lacerations requiring sutures, Nose patent.  Eyes: PERRLA, EOMI, Sclera and conjunctiva clear, No discharge.   Neck:  Supple no nuchal rigidity, no thyromegaly or mass. Non-tender  Lymphatic: No supraclavicular lymph nodes.   Cardiovascular: Heart is regular rate and rhythm no murmur, rub or thrill.   Thorax & Lungs: Chest is symmetrical, with good breath sounds. No wheezing or crackles. No respiratory distress, No chest tenderness.   Abdomen: Soft, No tenderness no hepatosplenomegaly there is no guarding or rebound, No masses, No pulsatile masses. Bowel sounds are present.  Skin: Warm, Dry, no petechia, purpura, or rash.   Back: Non tender with palpation, No CVA tenderness.   Extremities: No edema. Non tender. Left arm has a dialysis catheter in place which seems to be functioning and good thrill in left arm. IV in right arm.  Musculoskeletal: Good range of motion to wrists, elbows, shoulders, hips, knees, and ankles. Pulses 2+ radially and femorally.  Neurologic: Alert & oriented to person, time, and place.  Strength is 5 over 5 and symmetric in bilateral upper and lower extremities.  Sensory is intact to light touch to face, arms, and legs.  DTRs are symmetrical in biceps brachioradialis, patella and Achilles.   Psychiatric: Normal affect      EKG  09:58- 13 Lead EKG interpreted by me shows sinus tachycardia at 104.  . Axis QRS 47, No ST elevations. Similar to EKG dated 03/01/17. "       LABS  Results for orders placed or performed during the hospital encounter of 07/29/17   CBC WITH DIFFERENTIAL   Result Value Ref Range    WBC 4.8 4.8 - 10.8 K/uL    RBC 3.13 (L) 4.70 - 6.10 M/uL    Hemoglobin 9.6 (L) 14.0 - 18.0 g/dL    Hematocrit 29.7 (L) 42.0 - 52.0 %    MCV 94.9 81.4 - 97.8 fL    MCH 30.7 27.0 - 33.0 pg    MCHC 32.3 (L) 33.7 - 35.3 g/dL    RDW 47.1 35.9 - 50.0 fL    Platelet Count 180 164 - 446 K/uL    MPV 9.8 9.0 - 12.9 fL    Neutrophils-Polys 72.70 (H) 44.00 - 72.00 %    Lymphocytes 20.40 (L) 22.00 - 41.00 %    Monocytes 5.00 0.00 - 13.40 %    Eosinophils 1.30 0.00 - 6.90 %    Basophils 0.40 0.00 - 1.80 %    Immature Granulocytes 0.20 0.00 - 0.90 %    Nucleated RBC 0.00 /100 WBC    Neutrophils (Absolute) 3.46 1.82 - 7.42 K/uL    Lymphs (Absolute) 0.97 (L) 1.00 - 4.80 K/uL    Monos (Absolute) 0.24 0.00 - 0.85 K/uL    Eos (Absolute) 0.06 0.00 - 0.51 K/uL    Baso (Absolute) 0.02 0.00 - 0.12 K/uL    Immature Granulocytes (abs) 0.01 0.00 - 0.11 K/uL    NRBC (Absolute) 0.00 K/uL   COMP METABOLIC PANEL   Result Value Ref Range    Sodium 137 135 - 145 mmol/L    Potassium 3.9 3.6 - 5.5 mmol/L    Chloride 92 (L) 96 - 112 mmol/L    Co2 20 20 - 33 mmol/L    Anion Gap 25.0 (H) 0.0 - 11.9    Glucose 78 65 - 99 mg/dL    Bun 39 (H) 8 - 22 mg/dL    Creatinine 6.64 (HH) 0.50 - 1.40 mg/dL    Calcium 9.7 8.5 - 10.5 mg/dL    AST(SGOT) 12 12 - 45 U/L    ALT(SGPT) 11 2 - 50 U/L    Alkaline Phosphatase 58 30 - 99 U/L    Total Bilirubin 0.5 0.1 - 1.5 mg/dL    Albumin 4.0 3.2 - 4.9 g/dL    Total Protein 8.2 6.0 - 8.2 g/dL    Globulin 4.2 (H) 1.9 - 3.5 g/dL    A-G Ratio 1.0 g/dL   MAGNESIUM   Result Value Ref Range    Magnesium 2.2 1.5 - 2.5 mg/dL   IONIZED CALCIUM   Result Value Ref Range    Ionized Calcium 1.1 1.1 - 1.3 mmol/L   ESTIMATED GFR   Result Value Ref Range    GFR If  12 (A) >60 mL/min/1.73 m 2    GFR If Non African American 10 (A) >60 mL/min/1.73 m 2      All labs reviewed by  me.        COURSE & MEDICAL DECISION MAKING  Pertinent Labs & Imaging studies reviewed. (See chart for details)        10:08 AM Patient seen and examined at bedside. Patient presents for a seizure.  Exam indicates two tongue abrasions which does not require repair; neurologically intact.    Initial orders in the Emergency Department included EKG and laboratory testing: CBC with differential, CMP, estimated GFR, magnesium and ionized calcium. Patient verbalized their understanding and agreement to this plan.    10:57 AM On repeat evaluation, patient is feeling well. Nephrologist is Dr. Rubin.  Lab results were discussed as noted above.    11:05 AM Spoke with Dr. Rubin, Nephrology, regarding the patient's seizure during dialysis. Lab results were discussed.  He states seizures are not commonly associated with dialysis.  Further evaluation is recommended.    11:11 AM Review of the patient's electronic medical records include no prior CT head or EEG completed to evaluate his seizure from one year ago.  History of hyperkalemia.     11:13 AM Recommended the patient be admitted for further evaluation of his seizure.  He has verbalized his agreement to this plan.    11:14 AM Spoke with Dr. Israel, Hospitalist, regarding the patient's seizure from today and one year ago.  Lab results were reviewed.  He will consult and admit the patient for further evaluation and care.        Decision Making:  Patient is a dialysis patient. He states he had a seizure one year ago. He was admitted that time but he signed out against medical advice prior to getting an MRI scan or a EEG. He now had a witnessed seizure while getting dialysis today grand mal lasting 60-90 seconds. Bit his tongue. Upon arrival here in the emergency room. Findings awake alert offers no complaints. His initial workup shows that he has electrolyte imbalances consistent with dialysis but no obvious hyperkalemia. His magnesium and calcium seem to be in reasonable  levels. I discussed the case with the [facility] Hospitalist and we'll admit him to the hospital further care and evaluation for this seizure. I did discuss case also with Dr. Ti Rubin and he suggested we have consultation about his seizures as well.  Explained to the patient we should admit him to the hospital for a possible MRI scan and EEG  and he states he will stay now to have these testing done.    DISPOSITION  Patient will be admitted to Dr. Israel, Hospitalist, in stable condition.      DIAGNOSIS  1. Seizure (CMS-HCC)    2. Renal failure    3. Abrasion of tongue, initial encounter         PLAN  1. Admission [facility] Hospitalist's  2. Continue workup and evaluation for his seizure  3. Continue evaluation and care of his renal failure        The note accurately reflects work and decisions made by me.  Prabhu Tinajero  7/29/2017  5:10 PM     Rox FIERRO (Scribe), am scribing for, and in the presence of, Prabhu Tinajero M.D.    Electronically signed by: Rox Montanez (Scribe), 7/29/2017    Prabhu FIERRO M.D. personally performed the services described in this documentation, as scribed by Rox Montanez in my presence, and it is both accurate and complete.

## 2017-07-29 NOTE — IP AVS SNAPSHOT
" <p align=\"LEFT\"><IMG SRC=\"//[email or web address]$/[email or web address]\" alt=\"Image\" WIDTH=\"50%\" HEIGHT=\"200\" BORDER=\"\"></p>                   Name:Otoniel Wing  Medical Record Number:[id number]  CSN: [id number]    YOB: 1990   Age: 27 y.o.  Sex: male  HT:1.727 m (5' 8\") WT: 63.7 kg (140 lb 6.9 oz)          Admit Date: 7/29/2017     Discharge Date:   Today's Date: 8/1/2017  Attending Doctor:  Familia Gross M.D.                  Allergies:  Bloodless and Vancomycin          Your appointments     Aug 02, 2017 11:40 AM   CARE MANAGER TELEPHONE VISIT with CARE MANAGER   [facility]    [address] [place] [address] [address]  [facility] [id number] [phone]           ***IMPORTANT**** This is a phone visit only. Do not come into the office. The Care Manager will call you at the scheduled time for Care Manager Telephone Visit.            Aug 03, 2017  9:00 AM   Established Patient with DOMINIQUE Sanchez   [facility]    [address]  [place] [id number] [phone]           You will be receiving a confirmation call a few days before your appointment from our automated call confirmation system.            Sep 05, 2017  9:40 AM   New Patient with Patel Munoz M.D.   [facility] [address]    [address]  [place] [id number] [phone]           Please bring Photo ID, Insurance Cards, All Medication Bottles and copies of any legal documents (such as Living Will, Power of ) If speaking a language besides English please bring an adult . Please arrive 30 minutes prior for check in and registration. You will be receiving a confirmation call a few days before your appointment from our automated call confirmation system.            Nov 28, 2017  9:40 AM   New Patient with Roscoe Keys M.D.   [facility] (--)    [address], [address]  [facility] [address] [phone]           Please " bring Photo ID, Insurance Cards, All Medication Bottles and copies of any legal documents (such as Living Will, Power of ) If speaking a language besides English please bring an adult . Please arrive 30 minutes prior for check in and registration. You will be receiving a confirmation call a few days before your appointment from our automated call confirmation system.              Follow-up Information     1. Schedule an appointment as soon as possible for a visit with Risa Colon M.D..    Specialty:  Neurology    Contact information    [address] [address] [phone]           Medication List      Take these Medications        Instructions    calcitRIOL 0.5 MCG Caps   Commonly known as:  ROCALTROL    Take 1 Cap by mouth 2 Times a Day.   Dose:  0.5 mcg       Calcium Carbonate Antacid 1000 MG Chew    Take 4,000 mg by mouth every 6 hours.   Dose:  4000 mg       carvedilol 6.25 MG Tabs   Commonly known as:  COREG    Take 1 Tab by mouth 2 times a day, with meals.   Dose:  6.25 mg       Cinacalcet HCl 60 MG Tabs    Take 60 mg by mouth every day.   Dose:  60 mg       levetiracetam 500 MG Tabs   Commonly known as:  KEPPRA    Take 1 Tab by mouth 2 Times a Day.   Dose:  500 mg       lisinopril 5 MG Tabs   Commonly known as:  PRINIVIL    Take 1 Tab by mouth every day.   Dose:  5 mg

## 2017-07-29 NOTE — IP AVS SNAPSHOT
" Home Care Instructions                                                                                                                  Name:Otoniel Wing  Medical Record Number:[id number]  CSN: [id number]    YOB: 1990   Age: 27 y.o.  Sex: male  HT:1.727 m (5' 8\") WT: 63.7 kg (140 lb 6.9 oz)          Admit Date: 7/29/2017     Discharge Date:   Today's Date: 8/1/2017  Attending Doctor:  Familia Gross M.D.                  Allergies:  Bloodless and Vancomycin            Discharge Instructions       Discharge Instructions    Discharged to home by car with friend. Discharged via wheelchair, hospital escort: Refused.  Special equipment needed: Not Applicable    Be sure to schedule a follow-up appointment with your primary care doctor or any specialists as instructed.     Discharge Plan:   Influenza Vaccine Indication: Not indicated: Previously immunized this influenza season and > 8 years of age    I understand that a diet low in cholesterol, fat, and sodium is recommended for good health. Unless I have been given specific instructions below for another diet, I accept this instruction as my diet prescription.     Special Instructions:   HF Patient Discharge Instructions  · Monitor your weight daily, and maintain a weight chart, to track your weight changes.   · Activity as tolerated, unless your Doctor has ordered otherwise.   · Follow a low fat, low cholesterol, low salt diet unless instructed otherwise by your Doctor. Read the labels on the back of food products and track your intake of fat, cholesterol and salt.   · Fluid Restriction No. If a Fluid Restriction has been ordered by your Doctor, measure fluids with a measuring cup to ensure that you are not exceeding the restriction.   · No smoking.  · Oxygen No. If your Doctor has ordered that you wear Oxygen at home, it is important to wear it as ordered.  · Did you receive an explanation from staff on the importance of taking each of your " medications and why it is necessary to keep taking them unless your doctor says to stop? Yes  · Were all of your questions answered about how to manage your heart failure and what to do if you have increased signs and symptoms after you go home? Yes  · Do you feel like your heart failure care team involved you in the care treatment plan and allowed you to make decisions regarding your care while in the hospital and addressed any discharge needs you might have? Yes    See the educational handout provided at discharge for more information on monitoring your daily weight, activity and diet. This also explains more about Heart Failure, symptoms of a flare-up and some of the tests that you have undergone.     Warning Signs of a Flare-Up include:  · Swelling in the ankles or lower legs.  · Shortness of breath, while at rest, or while doing normal activities.   · Shortness of breath at night when in bed, or coughing in bed.   · Requiring more pillows to sleep at night, or needing to sit up at night to sleep.  · Feeling weak, dizzy or fatigued.     When to call your Doctor:  · Call [facility] seven days a week from 8:00 a.m. to 8:00 p.m. for medical questions [phone].  · Call your Primary Care Physician or Cardiologist if:   · You experience any pain radiating to your jaw or neck.  · You have any difficulty breathing.  · You experience weight gain of 3 lbs in a day or 5 lbs in a week.   · You feel any palpitations or irregular heartbeats.  · You become dizzy or lose consciousness.   If you have had an angiogram or had a pacemaker or AICD placed, and experience:  1. Bleeding, drainage or swelling at the surgical / puncture site.  2. Fever greater than 100.0 F  3. Shock from internal defibrillator.  4. Cool and / or numb extremities.      · Is patient discharged on Warfarin / Coumadin?   No     · Is patient Post Blood Transfusion?  No    Depression / Suicide Risk    As you are discharged from [facility] facility, it is important to learn how to keep safe from harming yourself.    Recognize the warning signs:  · Abrupt changes in personality, positive or negative- including increase in energy   · Giving away possessions  · Change in eating patterns- significant weight changes-  positive or negative  · Change in sleeping patterns- unable to sleep or sleeping all the time   · Unwillingness or inability to communicate  · Depression  · Unusual sadness, discouragement and loneliness  · Talk of wanting to die  · Neglect of personal appearance   · Rebelliousness- reckless behavior  · Withdrawal from people/activities they love  · Confusion- inability to concentrate     If you or a loved one observes any of these behaviors or has concerns about self-harm, here's what you can do:  · Talk about it- your feelings and reasons for harming yourself  · Remove any means that you might use to hurt yourself (examples: pills, rope, extension cords, firearm)  · Get professional help from the community (Mental Health, Substance Abuse, psychological counseling)  · Do not be alone:Call your Safe Contact- someone whom you trust who will be there for you.  · Call your local CRISIS HOTLINE 854-4509 or 872-114-0142  · Call your local Children's Mobile Crisis Response Team [place] (202) 592-6830 or www.[employer]  · Call the toll free National Suicide Prevention Hotlines   · National Suicide Prevention Lifeline 707-418-HUIV (5163)  · National Hope Line Network 800-SUICIDE (975-9071)    Seizure, Adult  A seizure means there is unusual activity in the brain. A seizure can cause changes in attention or behavior. Seizures often cause shaking (convulsions). Seizures often last from 30 seconds to 2 minutes.  HOME CARE   · If you are given medicines, take them exactly as told by your doctor.  · Keep all doctor visits as told.  · Do not swim or drive until your doctor says it is okay.  · Teach others what to do if you have a  seizure. They should:  · Lay you on the ground.  · Put a cushion under your head.  · Loosen any tight clothing around your neck.  · Turn you on your side.  · Stay with you until you get better.  GET HELP RIGHT AWAY IF:   · The seizure lasts longer than 2 to 5 minutes.  · The seizure is very bad.  · The person does not wake up after the seizure.  · The person's attention or behavior changes.  Drive the person to the emergency room or call your local emergency services (911 in [place]  MAKE SURE YOU:   · Understand these instructions.  · Will watch your condition.  · Will get help right away if you are not doing well or get worse.     This information is not intended to replace advice given to you by your health care provider. Make sure you discuss any questions you have with your health care provider.     Document Released: 06/05/2009 Document Revised: 03/11/2013 Document Reviewed: 12/05/2012  [employer] Interactive Patient Education ©2016 [employer].    End-Stage Kidney Disease  The kidneys are two organs that lie on either side of the spine between the middle of the back and the front of the abdomen. The kidneys:   · Remove wastes and extra water from the blood.    · Produce important hormones. These help keep bones strong, regulate blood pressure, and help create red blood cells.    · Balance the fluids and chemicals in the blood and tissues.  End-stage kidney disease occurs when the kidneys are so damaged that they cannot do their job. When the kidneys cannot do their job, life-threatening problems occur. The body cannot stay clean and strong without the help of the kidneys. In end-stage kidney disease, the kidneys cannot get better. You need a new kidney or treatments to do some of the work healthy kidneys do in order to stay alive.  CAUSES   End-stage kidney disease usually occurs when a long-lasting (chronic) kidney disease gets worse. It may also occur after the kidneys are suddenly damaged (acute kidney  injury).   SYMPTOMS   · Swelling (edema) of the legs, ankles, or feet.    · Tiredness (lethargy).    · Nausea or vomiting.    · Confusion.    · Problems with urination, such as:    ¨ Decreased urine production.    ¨ Frequent urination, especially at night.    ¨ Frequent accidents in children who are potty trained.    · Muscle twitches and cramps.    · Persistent itchiness.    · Loss of appetite.    · Headaches.    · Abnormally dark or light skin.    · Numbness in the hands or feet.    · Easy bruising.    · Frequent hiccups.    · Menstruation stops.  DIAGNOSIS   Your health care provider will measure your blood pressure and take some tests. These may include:   · Urine tests.    · Blood tests.    · Imaging tests, such as:    ¨ An ultrasound exam.    ¨ Computed tomography (CT).  · A kidney biopsy.  TREATMENT   There are two treatments for end-stage kidney disease:   · A procedure that removes toxic wastes from the body (dialysis).    · Receiving a new kidney (kidney transplant).  Both of these treatments have serious risks and consequences. Your health care provider will help you determine which treatment is best for you based on your health, age, and other factors. In addition to having dialysis or a kidney transplant, you may need to take medicines to control high blood pressure (hypertension) and cholesterol and to decrease phosphorus levels in your blood.   HOME CARE INSTRUCTIONS  · Follow your prescribed diet.    · Take medicines only as directed by your health care provider.    · Do not take any new medicines (prescription, over-the-counter, or nutritional supplements) unless approved by your health care provider. Many medicines can worsen your kidney damage or need to have the dose adjusted.    · Keep all follow-up visits as directed by your health care provider.  MAKE SURE YOU:  · Understand these instructions.  · Will watch your condition.  · Will get help right away if you are not doing well or get worse.       This information is not intended to replace advice given to you by your health care provider. Make sure you discuss any questions you have with your health care provider.     Document Released: 03/09/2005 Document Revised: 01/08/2016 Document Reviewed: 08/16/2013  [employer] Interactive Patient Education ©2016 [employer]          Your appointments     Aug 02, 2017 11:40 AM   CARE MANAGER TELEPHONE VISIT with CARE MANAGER   [facility]    [address]  [place] [id number] [phone]           ***IMPORTANT**** This is a phone visit only. Do not come into the office. The Care Manager will call you at the scheduled time for Care Manager Telephone Visit.            Aug 03, 2017  9:00 AM   Established Patient with DOMINIQUE Sanchez   [facility])    [address]  [place] [address]   [phone]           You will be receiving a confirmation call a few days before your appointment from our automated call confirmation system.            Sep 05, 2017  9:40 AM   New Patient with Patel Munoz M.D.   [facility] [address]    [address]  [address]  [place] [address] [phone]           Please bring Photo ID, Insurance Cards, All Medication Bottles and copies of any legal documents (such as Living Will, Power of ) If speaking a language besides English please bring an adult . Please arrive 30 minutes prior for check in and registration. You will be receiving a confirmation call a few days before your appointment from our automated call confirmation system.            Nov 28, 2017  9:40 AM   New Patient with Roscoe Keys M.D.   [facility] (--)    [address]  [place] [address]   [phone]           Please bring Photo ID, Insurance Cards, All Medication Bottles and copies of any legal documents (such as Living Will, Power of ) If speaking a language  besides English please bring an adult . Please arrive 30 minutes prior for check in and registration. You will be receiving a confirmation call a few days before your appointment from our automated call confirmation system.              Follow-up Information     1. Schedule an appointment as soon as possible for a visit with Risa Colon M.D..    Specialty:  Neurology    Contact information    [address] [place] [id number] [phone]           Discharge Medication Instructions:    Below are the medications your physician expects you to take upon discharge:    Review all your home medications and newly ordered medications with your doctor and/or pharmacist. Follow medication instructions as directed by your doctor and/or pharmacist.    Please keep your medication list with you and share with your physician.               Medication List      START taking these medications        Instructions    Morning Afternoon Evening Bedtime    levetiracetam 500 MG Tabs   Last time this was given:  1,000 mg on 8/1/2017  9:18 AM   Commonly known as:  KEPPRA   Next Dose Due:  8/1 9pm        Take 1 Tab by mouth 2 Times a Day.   Dose:  500 mg                          CONTINUE taking these medications        Instructions    Morning Afternoon Evening Bedtime    calcitRIOL 0.5 MCG Caps   Last time this was given:  0.5 mcg on 8/1/2017  9:20 AM   Commonly known as:  ROCALTROL   Next Dose Due:  8/1 dinnertime        Take 1 Cap by mouth 2 Times a Day.   Dose:  0.5 mcg                        Calcium Carbonate Antacid 1000 MG Chew   Last time this was given:  4,000 mg on 8/1/2017 12:59 AM   Next Dose Due:  8/1 lunchtime        Take 4,000 mg by mouth every 6 hours.   Dose:  4000 mg                        carvedilol 6.25 MG Tabs   Last time this was given:  6.25 mg on 8/1/2017  9:18 AM   Commonly known as:  COREG   Next Dose Due:  8/1 dinnertime        Take 1 Tab by mouth 2 times a day, with meals.   Dose:  6.25 mg                         Cinacalcet HCl 60 MG Tabs   Next Dose Due:  8/2        Take 60 mg by mouth every day.   Dose:  60 mg                        lisinopril 5 MG Tabs   Last time this was given:  5 mg on 8/1/2017  9:18 AM   Commonly known as:  PRINIVIL   Next Dose Due:  8/2        Take 1 Tab by mouth every day.   Dose:  5 mg                             Where to Get Your Medications      These medications were sent to [facility] [place] - [address]  [address] [place] [address]     Phone:  [phone]    - levetiracetam 500 MG Tabs            Instructions           Diet / Nutrition:    Follow any diet instructions given to you by your doctor or the dietician, including how much salt (sodium) you are allowed each day.    If you are overweight, talk to your doctor about a weight reduction plan.    Activity:    Remain physically active following your doctor's instructions about exercise and activity.    Rest often.     Any time you become even a little tired or short of breath, SIT DOWN and rest.    Worsening Symptoms:    Report any of the following signs and symptoms to the doctor's office immediately:    *Pain of jaw, arm, or neck  *Chest pain not relieved by medication                               *Dizziness or loss of consciousness  *Difficulty breathing even when at rest   *More tired than usual                                       *Bleeding drainage or swelling of surgical site  *Swelling of feet, ankles, legs or stomach                 *Fever (>100ºF)  *Pink or blood tinged sputum  *Weight gain (3lbs/day or 5lbs /week)           *Shock from internal defibrillator (if applicable)  *Palpitations or irregular heartbeats                *Cool and/or numb extremities    Stroke Awareness    Common Risk Factors for Stroke include:    Age  Atrial Fibrillation  Carotid Artery Stenosis  Diabetes Mellitus  Excessive alcohol consumption  High blood pressure  Overweight   Physical  inactivity  Smoking    Warning signs and symptoms of a stroke include:    *Sudden numbness or weakness of the face, arm or leg (especially on one side of the body).  *Sudden confusion, trouble speaking or understanding.  *Sudden trouble seeing in one or both eyes.  *Sudden trouble walking, dizziness, loss of balance or coordination.Sudden severe headache with no known cause.    It is very important to get treatment quickly when a stroke occurs. If you experience any of the above warning signs, call 911 immediately.                   Disclaimer         Quit Smoking / Tobacco Use:    I understand the use of any tobacco products increases my chance of suffering from future heart disease or stroke and could cause other illnesses which may shorten my life. Quitting the use of tobacco products is the single most important thing I can do to improve my health. For further information on smoking / tobacco cessation call a Toll Free Quit Line at 1-817.135.3481 (*National Cancer [place]) or 1-328.762.5645 (American Lung Association) or you can access the web based program at www.lungPatient Communicator.org.    [place] Tobacco Users Help Line:  (210) 118-7503       Toll Free: 1-853.600.8559  Quit Tobacco Program [facility] (548)955-8608    Crisis Hotline:    [place] Crisis Hotline:  6-070-MGZLKKK or 1-821.553.9596    [place] Crisis Hotline:    1-415.983.7827 or 161-990-3014    Discharge Survey:   Thank you for choosing [facility]. We hope we did everything we could to make your hospital stay a pleasant one. You may be receiving a phone survey and we would appreciate your time and participation in answering the questions. Your input is very valuable to us in our efforts to improve our service to our patients and their families.        My signature on this form indicates that:    1. I have reviewed and understand the above information.  2. My questions regarding this information have been answered to my  satisfaction.  3. I have formulated a plan with my discharge nurse to obtain my prescribed medications for home.                  Disclaimer         __________________________________                     __________       ________                       Patient Signature                                                 Date                    Time

## 2017-07-29 NOTE — IP AVS SNAPSHOT
Eurekster Access Code: [email or web address]  Expires: 8/6/2017  4:08 AM    Your email address is not on file at [facility].  Email Addresses are required for you to sign up for Eurekster, please contact [phone] to verify your personal information and to provide your email address prior to attempting to register for [employer]kster.    Otoinel Wing  [address] [place] [address] [address] [place] [id number]    RetiDiagt  A secure, online tool to manage your health information     [facility]’s Eurekster® is a secure, online tool that connects you to your personalized health information from the privacy of your home -- day or night - making it very easy for you to manage your healthcare. Once the activation process is completed, you can even access your medical information using the Eurekster joshua, which is available for free in the Apple Joshua store or Google Play store.     To learn more about Eurekster, visit [email or web address]    There are two levels of access available (as shown below):   My Chart Features  [facility] [place] [facility] Primary Care Doctor [facility]  Specialists [facility]  Urgent  Care Non-[facility] Primary Care Doctor   Email your healthcare team securely and privately 24/7 X X X    Manage appointments: schedule your next appointment; view details of past/upcoming appointments X      Request prescription refills. X      View recent personal medical records, including lab and immunizations X X X X   View health record, including health history, allergies, medications X X X X   Read reports about your outpatient visits, procedures, consult and ER notes X X X X   See your discharge summary, which is a recap of your hospital and/or ER visit that includes your diagnosis, lab results, and care plan X X  X     How to register for RetiDiagt:  Once your e-mail address has been verified, follow the following steps to sign up for RetiDiagt.     1. Go to  [email or web address]  2. Click on the Sign Up Now box, which takes you to the New Member Sign Up  page. You will need to provide the following information:  a. Enter your BiiCode Access Code exactly as it appears at the top of this page. (You will not need to use this code after you’ve completed the sign-up process. If you do not sign up before the expiration date, you must request a new code.)   b. Enter your date of birth.   c. Enter your home email address.   d. Click Submit, and follow the next screen’s instructions.  3. Create a BiiCode ID. This will be your BiiCode login ID and cannot be changed, so think of one that is secure and easy to remember.  4. Create a BiiCode password. You can change your password at any time.  5. Enter your Password Reset Question and Answer. This can be used at a later time if you forget your password.   6. Enter your e-mail address. This allows you to receive e-mail notifications when new information is available in BiiCode.  7. Click Sign Up. You can now view your health information.    For assistance activating your BiiCode account, call [phone]

## 2017-07-29 NOTE — ED NOTES
BIB [facility] to rm 3  Chief Complaint   Patient presents with   • Seizure     witnessed seizure in dialysis this morning     Pt normally gets dialysis Tue, Thurs, Sat, but he missed his last dialysis on Thursday.  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 Pt did bite his tongue, no incontinence.  Pt still has his fistula accessed.  Chart up for ERP.

## 2017-07-29 NOTE — H&P
"HOSPITAL MEDICINE HISTORY/ PHYSICAL    Date & Time note created:    7/29/2017   4:02 PM       Patient ID:   Name: Otoniel Wing  .  YOB: 1990. Age: 27 y.o. male.  MRN: [id number]    PCP : Pcp Pt States None    CC:   Seizure during dialysis    HPI:    Mecca is a very pleasant 27 y.o. male with a past medical history of  end-stage renal disease on hemodialysis Tuesday Thursday and Saturday by [facility], essentially was undergoing dialysis today when patient turned having a grand mal seizure associated with tongue biting. Patient did have postictal confusion but denied having any urinary incontinence. Apparently patient also had a similar episode last year during dialysis. At this time patient is clinically stable and denies having any headache chest pain shortness of breath or nausea vomiting. We will admit the patient for further workup on cause of the seizures.      Review of Systems:    Please see HPI, all other systems were reviewed and are negative (AMA/CMS criteria)              Allergies to Medications  Bloodless and Vancomycin      Past Medical History  1. Previous history of grand mal seizres  2. ESRD on HD T/Th/Sat  3. History of systolic heart failure LVEF 40%    Family History:  Denies any family history of heart disease or renal disease    Social History:  Patient denies using Alcohol, tobacco or Illicit drugs.    Outpatient Medications:    Medication   • lisinopril (PRINIVIL) 5 MG Tab   • carvedilol (COREG) 6.25 MG Tab   • calcitRIOL (ROCALTROL) 0.5 MCG Cap   • Calcium Carbonate Antacid 1000 MG Chew Tab         Physical Exam:   Blood pressure 143/71, pulse 98, temperature 37.1 °C (98.8 °F), resp. rate 17, height 1.727 m (5' 8\"), weight 59.5 kg (131 lb 2.8 oz), SpO2 100 %.  Constitutional:  well developed, well nourished nontoxic appearance.  HENMT: Normocephalic, atraumatic, b/l ears normal, nose normal  Eyes:  EOMI, conjunctiva normal, no " discharge  Neck: no tracheal deviation, supple, no stridor appreciated   Cardiovascular: normal heart rate, normal rhythm, no murmurs, no rubs or gallops; no cyanosis, clubbing or edema  Lungs: Respiratory effort is normal,  breath sounds clear to auscultation bilaterally, no wheezes,No rales no rhonchi appreciated  Abdomen: soft, non-tender, non-distended, no guarding or rebound tenderness, no pulsatile masses noted.   Skin: warm, dry, no erythema and no rash  Extremities:  Distal pulses intact +2.  No cyanosis or clubbing. No edema Noted  No joint deformities, Range of motion appears optimal   Neurologic:  Alert and oriented x3.  Cranial nerves II-XII grossly intact.  No   focal deficits.  Psychiatric: No anxiety or depression      Lab Data Review:    Recent Labs      07/29/17   0955   WBC  4.8   RBC  3.13*   HEMOGLOBIN  9.6*   HEMATOCRIT  29.7*   MCV  94.9   MCH  30.7   RDW  47.1   PLATELETCT  180   MPV  9.8   NEUTSPOLYS  72.70*   LYMPHOCYTES  20.40*   MONOCYTES  5.00   EOSINOPHILS  1.30   BASOPHILS  0.40             Recent Labs      07/29/17   0955  07/29/17   1336   SODIUM  137   --    POTASSIUM  3.9   --    CHLORIDE  92*   --    CO2  20   --    BUN  39*   --    CREATININE  6.64*   --    CALCIUM  9.7   --    MAGNESIUM  2.2  2.2   PHOSPHORUS   --   5.1*   ALBUMIN  4.0   --      Recent Labs      07/29/17   0955   ASTSGOT  12   ALTSGPT  11   TBILIRUBIN  0.5   ALKPHOSPHAT  58   GLOBULIN  4.2*         Imaging/Procedures Review:    MR-BRAIN-W/O    (Results Pending)       EKG:   per my independant read:  HR: 104, mild sinus tachycardia, no ST/T changes      Assessment and Plan:      1. Grand mal seizures   - History of similar presentation 1 year ago which recurred again during dialysis.   - Check MRI of brain.  - EEG pending  - prn IV ativan if seizure activity occurs, we will hold off on anti seizure medications for now.     2. History of systolic heart failure  - Last echocardiogram 7/5/2017 was 40%, improvement  from 30% from March  - Follows up  To cardiology  - Continue Lisinopril and Coreg for cardiac protective measures  - we will not repeat another echocardiogram    3. Anemia  - most likely 2/2 to ESRD  - follow daily cbc.      Prophylaxis: sc heparin  Code: Full code  Dispo: I anticipate hospital length of stay for medical management to be expected to take greater than than 2 midnights    This dictation was created using voice recognition software. The accuracy of the dictation is limited to the abilities of the software. I expect there may be some errors of grammar and possibly content.

## 2017-07-30 PROBLEM — R56.9 SEIZURE (HCC): Status: ACTIVE | Noted: 2017-07-30

## 2017-07-30 LAB
ALBUMIN SERPL BCP-MCNC: 3.7 G/DL (ref 3.2–4.9)
ALBUMIN/GLOB SERPL: 1 G/DL
ALP SERPL-CCNC: 58 U/L (ref 30–99)
ALT SERPL-CCNC: 9 U/L (ref 2–50)
ANION GAP SERPL CALC-SCNC: 16 MMOL/L (ref 0–11.9)
AST SERPL-CCNC: 16 U/L (ref 12–45)
BASOPHILS # BLD AUTO: 0.5 % (ref 0–1.8)
BASOPHILS # BLD: 0.03 K/UL (ref 0–0.12)
BILIRUB SERPL-MCNC: 0.7 MG/DL (ref 0.1–1.5)
BUN SERPL-MCNC: 53 MG/DL (ref 8–22)
CALCIUM SERPL-MCNC: 8.6 MG/DL (ref 8.5–10.5)
CHLORIDE SERPL-SCNC: 92 MMOL/L (ref 96–112)
CO2 SERPL-SCNC: 26 MMOL/L (ref 20–33)
CREAT SERPL-MCNC: 11.12 MG/DL (ref 0.5–1.4)
EOSINOPHIL # BLD AUTO: 0.04 K/UL (ref 0–0.51)
EOSINOPHIL NFR BLD: 0.7 % (ref 0–6.9)
ERYTHROCYTE [DISTWIDTH] IN BLOOD BY AUTOMATED COUNT: 44.5 FL (ref 35.9–50)
GFR SERPL CREATININE-BSD FRML MDRD: 5 ML/MIN/1.73 M 2
GLOBULIN SER CALC-MCNC: 3.7 G/DL (ref 1.9–3.5)
GLUCOSE SERPL-MCNC: 69 MG/DL (ref 65–99)
HCT VFR BLD AUTO: 29.4 % (ref 42–52)
HGB BLD-MCNC: 9.7 G/DL (ref 14–18)
IMM GRANULOCYTES # BLD AUTO: 0.02 K/UL (ref 0–0.11)
IMM GRANULOCYTES NFR BLD AUTO: 0.3 % (ref 0–0.9)
LYMPHOCYTES # BLD AUTO: 1.14 K/UL (ref 1–4.8)
LYMPHOCYTES NFR BLD: 19.7 % (ref 22–41)
MCH RBC QN AUTO: 30.6 PG (ref 27–33)
MCHC RBC AUTO-ENTMCNC: 33 G/DL (ref 33.7–35.3)
MCV RBC AUTO: 92.7 FL (ref 81.4–97.8)
MONOCYTES # BLD AUTO: 0.46 K/UL (ref 0–0.85)
MONOCYTES NFR BLD AUTO: 7.9 % (ref 0–13.4)
NEUTROPHILS # BLD AUTO: 4.1 K/UL (ref 1.82–7.42)
NEUTROPHILS NFR BLD: 70.9 % (ref 44–72)
NRBC # BLD AUTO: 0 K/UL
NRBC BLD AUTO-RTO: 0 /100 WBC
PLATELET # BLD AUTO: 216 K/UL (ref 164–446)
PMV BLD AUTO: 10.3 FL (ref 9–12.9)
POTASSIUM SERPL-SCNC: 4.7 MMOL/L (ref 3.6–5.5)
PROT SERPL-MCNC: 7.4 G/DL (ref 6–8.2)
RBC # BLD AUTO: 3.17 M/UL (ref 4.7–6.1)
SODIUM SERPL-SCNC: 134 MMOL/L (ref 135–145)
WBC # BLD AUTO: 5.8 K/UL (ref 4.8–10.8)

## 2017-07-30 PROCEDURE — 80053 COMPREHEN METABOLIC PANEL: CPT

## 2017-07-30 PROCEDURE — A9270 NON-COVERED ITEM OR SERVICE: HCPCS | Performed by: HOSPITALIST

## 2017-07-30 PROCEDURE — 85025 COMPLETE CBC W/AUTO DIFF WBC: CPT

## 2017-07-30 PROCEDURE — 770020 HCHG ROOM/CARE - TELE (206)

## 2017-07-30 PROCEDURE — 99233 SBSQ HOSP IP/OBS HIGH 50: CPT | Performed by: HOSPITALIST

## 2017-07-30 PROCEDURE — 700102 HCHG RX REV CODE 250 W/ 637 OVERRIDE(OP): Performed by: HOSPITALIST

## 2017-07-30 PROCEDURE — 36415 COLL VENOUS BLD VENIPUNCTURE: CPT

## 2017-07-30 PROCEDURE — 700111 HCHG RX REV CODE 636 W/ 250 OVERRIDE (IP): Performed by: HOSPITALIST

## 2017-07-30 RX ADMIN — STANDARDIZED SENNA CONCENTRATE AND DOCUSATE SODIUM 2 TABLET: 8.6; 5 TABLET, FILM COATED ORAL at 20:37

## 2017-07-30 RX ADMIN — CALCITRIOL 0.5 MCG: 0.5 CAPSULE, LIQUID FILLED ORAL at 20:37

## 2017-07-30 RX ADMIN — ANTACID TABLETS 4000 MG: 500 TABLET, CHEWABLE ORAL at 13:20

## 2017-07-30 RX ADMIN — HEPARIN SODIUM 5000 UNITS: 5000 INJECTION, SOLUTION INTRAVENOUS; SUBCUTANEOUS at 05:18

## 2017-07-30 RX ADMIN — LISINOPRIL 5 MG: 5 TABLET ORAL at 09:24

## 2017-07-30 RX ADMIN — ACETAMINOPHEN 650 MG: 325 TABLET, FILM COATED ORAL at 02:45

## 2017-07-30 RX ADMIN — CARVEDILOL 6.25 MG: 6.25 TABLET, FILM COATED ORAL at 09:24

## 2017-07-30 RX ADMIN — STANDARDIZED SENNA CONCENTRATE AND DOCUSATE SODIUM 2 TABLET: 8.6; 5 TABLET, FILM COATED ORAL at 09:26

## 2017-07-30 RX ADMIN — ACETAMINOPHEN 650 MG: 325 TABLET, FILM COATED ORAL at 17:11

## 2017-07-30 RX ADMIN — HEPARIN SODIUM 5000 UNITS: 5000 INJECTION, SOLUTION INTRAVENOUS; SUBCUTANEOUS at 14:53

## 2017-07-30 RX ADMIN — CARVEDILOL 6.25 MG: 6.25 TABLET, FILM COATED ORAL at 17:06

## 2017-07-30 RX ADMIN — CALCITRIOL 0.5 MCG: 0.5 CAPSULE, LIQUID FILLED ORAL at 09:24

## 2017-07-30 RX ADMIN — ACETAMINOPHEN 650 MG: 325 TABLET, FILM COATED ORAL at 23:11

## 2017-07-30 RX ADMIN — ANTACID TABLETS 4000 MG: 500 TABLET, CHEWABLE ORAL at 17:05

## 2017-07-30 RX ADMIN — ANTACID TABLETS 4000 MG: 500 TABLET, CHEWABLE ORAL at 23:08

## 2017-07-30 RX ADMIN — HEPARIN SODIUM 5000 UNITS: 5000 INJECTION, SOLUTION INTRAVENOUS; SUBCUTANEOUS at 20:37

## 2017-07-30 RX ADMIN — ANTACID TABLETS 4000 MG: 500 TABLET, CHEWABLE ORAL at 05:18

## 2017-07-30 ASSESSMENT — ENCOUNTER SYMPTOMS
DEPRESSION: 0
EYES NEGATIVE: 1
PALPITATIONS: 0
BACK PAIN: 0
BLURRED VISION: 0
EYE PAIN: 0
CONSTITUTIONAL NEGATIVE: 1
DIZZINESS: 0
VOMITING: 0
SORE THROAT: 0
GASTROINTESTINAL NEGATIVE: 1
TINGLING: 0
FEVER: 0
RESPIRATORY NEGATIVE: 1
CARDIOVASCULAR NEGATIVE: 1
INSOMNIA: 0
SHORTNESS OF BREATH: 0
HEADACHES: 0
CHILLS: 0
NECK PAIN: 0
COUGH: 0
NAUSEA: 0
ABDOMINAL PAIN: 0

## 2017-07-30 ASSESSMENT — PAIN SCALES - GENERAL
PAINLEVEL_OUTOF10: 4
PAINLEVEL_OUTOF10: 3
PAINLEVEL_OUTOF10: 3
PAINLEVEL_OUTOF10: 7
PAINLEVEL_OUTOF10: 3
PAINLEVEL_OUTOF10: 8
PAINLEVEL_OUTOF10: 7

## 2017-07-30 NOTE — PROGRESS NOTES
"Patient transported to unit via gurney and safely ambulated to bed. Currently resting comfortably. C/o pain/tenderness tongue, \"4/10\". Secondary to biting during seizure activity. Laceration noted - left lateral side. Ambulating independently. Gait steady. Right AC CDI, +patent/blood return, no erythema/swelling noted. Left arm fistula +bruit/thrill. Verified heart monitor placement, sinus rhythm. Placed call-light within reach. Verified bed wheels locked and seizure protocol initiated. Instructed patient to call for assistance PRN. Hourly rounding performed and will continue to monitor.  "

## 2017-07-30 NOTE — PROGRESS NOTES
2 RN skin check completed.    Generalized scars noted to upper chest/torso. Moisturizer provided.    Scars/scabbing noted to left arm fistula. No erythema/drainage noted. HECTOR.     Bony prominences inspected and intact. No erythema noted.

## 2017-07-30 NOTE — CARE PLAN
Problem: Safety  Goal: Will remain free from injury  Outcome: PROGRESSING AS EXPECTED  Call-light placed within reach. Seizure protocol initiated. Instructed patient to call for assistance PRN.   Goal: Will remain free from falls  Outcome: PROGRESSING AS EXPECTED    Problem: Bowel/Gastric:  Goal: Normal bowel function is maintained or improved  Outcome: PROGRESSING AS EXPECTED  Bowel movement x 1 today. Refused Senna tablets with bedtime medications.

## 2017-07-30 NOTE — PROGRESS NOTES
[facility] NEPHROLOGY PROGRESS NOTE               Author: Ti Rubin Date & Time created: 7/30/2017  10:12 AM     Initial History:  28 yo HM -- ESRD on Tue/Thu/Sat dialysis schedule.  While at dialysis, almost done, had witnessed grand mal seizure with loss of consciousness and tongue biting.  Sent to [facility] ER for evaluation.      Had prior similar seizure, no treatment recommended.  Now with second seizure.  Not typical to get grand mal seizures during dialysis.  (this is only patient I have seen have one on dialysis in 27 years).    Doubt electrolyte imbalance related.    While in ER had complete resolution of symptoms.  BP ok.  Neurology evaluation underway.  Consult to manage ESRD    As noted, patient had a near complete dialysis on day of admission    DAILY NEPHROLOGY SUMMARY  7/29/17 - seen in consultation after dialysis as outpatient.  Seizure workup planned given 2nd time it has happened  7/30/17 - pt feels fine.  Next HD on Tues.  EEG/MRI in works      Review of Systems   Constitutional: Negative.    Eyes: Negative.    Respiratory: Negative.    Cardiovascular: Negative.    Gastrointestinal: Negative.    Skin: Negative.        Physical Exam   Constitutional: He appears well-developed. No distress.   HENT:   Head: Normocephalic.   Eyes: No scleral icterus.   Neck: Normal range of motion.   Cardiovascular: Normal rate.  Exam reveals no friction rub.    Pulmonary/Chest: Effort normal. No respiratory distress.   Abdominal: Soft. There is no tenderness.   Musculoskeletal: Normal range of motion. He exhibits no edema.   Neurological: He is alert. No cranial nerve deficit.   Skin: Skin is warm.   Psychiatric: He has a normal mood and affect.       Labs:        Invalid input(s): [id number]      Recent Labs      07/29/17   0955  07/29/17   1336  07/30/17   0239   SODIUM  137   --   134*   POTASSIUM  3.9   --   4.7   CHLORIDE  92*   --   92*   CO2  20   --   26   BUN  39*   --   53*   CREATININE  6.64*   --    11.12*   MAGNESIUM  2.2  2.2   --    PHOSPHORUS   --   5.1*   --    CALCIUM  9.7   --   8.6     Recent Labs      17   0955  17   0239   ALTSGPT  11  9   ASTSGOT  12  16   ALKPHOSPHAT  58  58   TBILIRUBIN  0.5  0.7   GLUCOSE  78  69     Recent Labs      17   0955  17   0239   RBC  3.13*  3.17*   HEMOGLOBIN  9.6*  9.7*   HEMATOCRIT  29.7*  29.4*   PLATELETCT  180  216     Recent Labs      17   0955  17   0239   WBC  4.8  5.8   NEUTSPOLYS  72.70*  70.90   LYMPHOCYTES  20.40*  19.70*   MONOCYTES  5.00  7.90   EOSINOPHILS  1.30  0.70   BASOPHILS  0.40  0.50   ASTSGOT  12  16   ALTSGPT  11  9   ALKPHOSPHAT  58  58   TBILIRUBIN  0.5  0.7           Hemodynamics:  Temp (24hrs), Av.3 °C (99.2 °F), Min:36.9 °C (98.4 °F), Max:37.9 °C (100.2 °F)  Temperature: 37.1 °C (98.7 °F)  Pulse  Av.3  Min: 79  Max: 116   Blood Pressure: 126/67 mmHg, NIBP: 139/91 mmHg     Respiratory:    Respiration: 18, Pulse Oximetry: 98 %           Fluids:    Intake/Output Summary (Last 24 hours) at 17 1012  Last data filed at 17 0400   Gross per 24 hour   Intake    720 ml   Output      0 ml   Net    720 ml     Weight: 63.9 kg (140 lb 14 oz)  GI/Nutrition:  Orders Placed This Encounter   Procedures   • Diet Order     Standing Status: Standing      Number of Occurrences: 1      Standing Expiration Date:      Order Specific Question:  Diet:     Answer:  Renal [8]     Medical Decision Making, by Problem:  Active Hospital Problems    Diagnosis   • Hypertension [I10]   • Grand mal seizure (CMS-HCC) [G40.409]   • ESRD on hemodialysis (CMS-HCC) [N18.6, Z99.2]     IMPRESSION/PLAN    # ESRD  -- s/p adequate dialysis on   -- no additional plan for dialysis today  -- maintain on renal diet  -- no BP/IVs or labs on dialysis arm  -- assume eGFR <10 when dosing all meds  -- monitor chems/hgb routinely  -- next HD likely on  unless patient gets Gadolinium - if that is needed, will do HD 3 days  consecutively to limit risk of injury    # SEIZURE  -- atypical for dialysis patient - syncope common but seizures not  -- work up and management per primary team/neurology    # ANEMIA  -- chronic due to ESRD  -- no overt bleeding  -- manage with EPOGEN and prn Transfusion                Core Measures

## 2017-07-30 NOTE — PROGRESS NOTES
[facility] Hospitalist Progress Note    Date of Service: 2017    Chief Complaint  27 y.o. male admitted 2017 with   Seizures during dialysis, happened 1 time before again during dialysis, MRI, EEG pending    Interval Problem Update  No events. He feels well.     Consultants/Specialty  none- consider neurology    Disposition  Keep in hospital. Studies pending.         Review of Systems   Constitutional: Negative for fever and chills.   HENT: Negative for sore throat.    Eyes: Negative for blurred vision and pain.   Respiratory: Negative for cough and shortness of breath.    Cardiovascular: Negative for chest pain and palpitations.   Gastrointestinal: Negative for nausea, vomiting and abdominal pain.   Genitourinary: Negative for dysuria and urgency.   Musculoskeletal: Negative for back pain and neck pain.   Skin: Negative for itching and rash.   Neurological: Negative for dizziness, tingling and headaches.   Psychiatric/Behavioral: Negative for depression. The patient does not have insomnia.    All other systems reviewed and are negative.     Physical Exam  Laboratory/Imaging   Hemodynamics  Temp (24hrs), Av.3 °C (99.2 °F), Min:36.9 °C (98.4 °F), Max:37.9 °C (100.2 °F)   Temperature: 36.9 °C (98.4 °F)  Pulse  Av.3  Min: 79  Max: 116    Blood Pressure: 137/72 mmHg, NIBP: 139/91 mmHg      Respiratory      Respiration: 12, Pulse Oximetry: 92 %, O2 Daily Delivery Respiratory : Room Air with O2 Available             Fluids    Intake/Output Summary (Last 24 hours) at 17 0757  Last data filed at 17 0400   Gross per 24 hour   Intake    720 ml   Output      0 ml   Net    720 ml       Nutrition  Orders Placed This Encounter   Procedures   • Diet Order     Standing Status: Standing      Number of Occurrences: 1      Standing Expiration Date:      Order Specific Question:  Diet:     Answer:  Renal [8]     Physical Exam   Constitutional: He is oriented to person, place, and time. He appears  well-developed and well-nourished. No distress.   HENT:   Right Ear: External ear normal.   Left Ear: External ear normal.   Nose: Nose normal.   Eyes: Right eye exhibits no discharge. Left eye exhibits no discharge. No scleral icterus.   Neck: No JVD present. No tracheal deviation present.   Cardiovascular: Normal rate, normal heart sounds and intact distal pulses.    No murmur heard.  Pulmonary/Chest: Effort normal and breath sounds normal. No respiratory distress. He has no wheezes. He has no rales.   Abdominal: Soft. Bowel sounds are normal. He exhibits no distension. There is no tenderness. There is no guarding.   Musculoskeletal: He exhibits no edema or tenderness.   Neurological: He is alert and oriented to person, place, and time.   Skin: Skin is warm and dry. He is not diaphoretic. No erythema.   Psychiatric: He has a normal mood and affect. His behavior is normal.   Nursing note and vitals reviewed.      Recent Labs      07/29/17   0955  07/30/17   0239   WBC  4.8  5.8   RBC  3.13*  3.17*   HEMOGLOBIN  9.6*  9.7*   HEMATOCRIT  29.7*  29.4*   MCV  94.9  92.7   MCH  30.7  30.6   MCHC  32.3*  33.0*   RDW  47.1  44.5   PLATELETCT  180  216   MPV  9.8  10.3     Recent Labs      07/29/17   0955  07/30/17   0239   SODIUM  137  134*   POTASSIUM  3.9  4.7   CHLORIDE  92*  92*   CO2  20  26   GLUCOSE  78  69   BUN  39*  53*   CREATININE  6.64*  11.12*   CALCIUM  9.7  8.6                      Assessment/Plan     Grand mal seizure (CMS-HCC)  Assessment & Plan  No meds. Prn ativan. If neg eeg consider neurology consult.       Core Measures

## 2017-07-31 ENCOUNTER — APPOINTMENT (OUTPATIENT)
Dept: RADIOLOGY | Facility: MEDICAL CENTER | Age: 27
DRG: 100 | End: 2017-07-31
Attending: HOSPITALIST
Payer: COMMERCIAL

## 2017-07-31 LAB
ANION GAP SERPL CALC-SCNC: 15 MMOL/L (ref 0–11.9)
BUN SERPL-MCNC: 71 MG/DL (ref 8–22)
CALCIUM SERPL-MCNC: 8.3 MG/DL (ref 8.5–10.5)
CHLORIDE SERPL-SCNC: 86 MMOL/L (ref 96–112)
CO2 SERPL-SCNC: 28 MMOL/L (ref 20–33)
CREAT SERPL-MCNC: 14.12 MG/DL (ref 0.5–1.4)
GFR SERPL CREATININE-BSD FRML MDRD: 4 ML/MIN/1.73 M 2
GLUCOSE SERPL-MCNC: 83 MG/DL (ref 65–99)
POTASSIUM SERPL-SCNC: 5.8 MMOL/L (ref 3.6–5.5)
SODIUM SERPL-SCNC: 129 MMOL/L (ref 135–145)

## 2017-07-31 PROCEDURE — 80048 BASIC METABOLIC PNL TOTAL CA: CPT

## 2017-07-31 PROCEDURE — 770006 HCHG ROOM/CARE - MED/SURG/GYN SEMI*

## 2017-07-31 PROCEDURE — 700102 HCHG RX REV CODE 250 W/ 637 OVERRIDE(OP): Performed by: HOSPITALIST

## 2017-07-31 PROCEDURE — A9270 NON-COVERED ITEM OR SERVICE: HCPCS | Performed by: HOSPITALIST

## 2017-07-31 PROCEDURE — 70551 MRI BRAIN STEM W/O DYE: CPT

## 2017-07-31 PROCEDURE — 90935 HEMODIALYSIS ONE EVALUATION: CPT

## 2017-07-31 PROCEDURE — 99232 SBSQ HOSP IP/OBS MODERATE 35: CPT | Performed by: HOSPITALIST

## 2017-07-31 PROCEDURE — 5A1D60Z PERFORMANCE OF URINARY FILTRATION, MULTIPLE: ICD-10-PCS | Performed by: HOSPITALIST

## 2017-07-31 PROCEDURE — 36415 COLL VENOUS BLD VENIPUNCTURE: CPT

## 2017-07-31 PROCEDURE — 95951 EEG: CPT | Mod: 52

## 2017-07-31 PROCEDURE — 700111 HCHG RX REV CODE 636 W/ 250 OVERRIDE (IP): Performed by: HOSPITALIST

## 2017-07-31 RX ADMIN — ANTACID TABLETS 4000 MG: 500 TABLET, CHEWABLE ORAL at 12:16

## 2017-07-31 RX ADMIN — LISINOPRIL 5 MG: 5 TABLET ORAL at 08:39

## 2017-07-31 RX ADMIN — STANDARDIZED SENNA CONCENTRATE AND DOCUSATE SODIUM 2 TABLET: 8.6; 5 TABLET, FILM COATED ORAL at 20:33

## 2017-07-31 RX ADMIN — HEPARIN SODIUM 5000 UNITS: 5000 INJECTION, SOLUTION INTRAVENOUS; SUBCUTANEOUS at 05:34

## 2017-07-31 RX ADMIN — HEPARIN SODIUM 5000 UNITS: 5000 INJECTION, SOLUTION INTRAVENOUS; SUBCUTANEOUS at 12:16

## 2017-07-31 RX ADMIN — HEPARIN SODIUM 5000 UNITS: 5000 INJECTION, SOLUTION INTRAVENOUS; SUBCUTANEOUS at 20:33

## 2017-07-31 RX ADMIN — ANTACID TABLETS 4000 MG: 500 TABLET, CHEWABLE ORAL at 19:29

## 2017-07-31 RX ADMIN — ANTACID TABLETS 4000 MG: 500 TABLET, CHEWABLE ORAL at 05:35

## 2017-07-31 RX ADMIN — LORAZEPAM 0.5 MG: 1 TABLET ORAL at 08:40

## 2017-07-31 RX ADMIN — CARVEDILOL 6.25 MG: 6.25 TABLET, FILM COATED ORAL at 19:29

## 2017-07-31 RX ADMIN — ACETAMINOPHEN 650 MG: 325 TABLET, FILM COATED ORAL at 20:33

## 2017-07-31 RX ADMIN — CALCITRIOL 0.5 MCG: 0.5 CAPSULE, LIQUID FILLED ORAL at 08:39

## 2017-07-31 RX ADMIN — CARVEDILOL 6.25 MG: 6.25 TABLET, FILM COATED ORAL at 08:39

## 2017-07-31 RX ADMIN — CALCITRIOL 0.5 MCG: 0.5 CAPSULE, LIQUID FILLED ORAL at 20:33

## 2017-07-31 ASSESSMENT — ENCOUNTER SYMPTOMS
CARDIOVASCULAR NEGATIVE: 1
SORE THROAT: 0
TINGLING: 0
NECK PAIN: 0
RESPIRATORY NEGATIVE: 1
CHILLS: 0
SHORTNESS OF BREATH: 0
VOMITING: 0
INSOMNIA: 0
EYE PAIN: 0
GASTROINTESTINAL NEGATIVE: 1
BLURRED VISION: 0
COUGH: 0
FEVER: 0
PALPITATIONS: 0
ABDOMINAL PAIN: 0
EYES NEGATIVE: 1
BACK PAIN: 0
CONSTITUTIONAL NEGATIVE: 1
HEADACHES: 0
NAUSEA: 0
DIZZINESS: 0
DEPRESSION: 0

## 2017-07-31 ASSESSMENT — PAIN SCALES - GENERAL
PAINLEVEL_OUTOF10: 2
PAINLEVEL_OUTOF10: 4
PAINLEVEL_OUTOF10: 4

## 2017-07-31 NOTE — PROGRESS NOTES
[facility] Hospitalist Progress Note    Date of Service: 2017    Chief Complaint  27 y.o. male admitted 2017 with Seizures during dialysis, happened 1 time before again during dialysis, MRI, EEG pending. Currently not on antiepileptics but likely needs them    Interval Problem Update  Sore muscles today in most muscle beds he went for dialysis today and had his MRI his EEG is still pending    Consultants/Specialty  none- consider neurology      Disposition  Keep in hospital. Studies pending.         Review of Systems   Constitutional: Negative for fever and chills.   HENT: Negative for sore throat.    Eyes: Negative for blurred vision and pain.   Respiratory: Negative for cough and shortness of breath.    Cardiovascular: Negative for chest pain and palpitations.   Gastrointestinal: Negative for nausea, vomiting and abdominal pain.   Genitourinary: Negative for dysuria and urgency.   Musculoskeletal: Negative for back pain and neck pain.   Skin: Negative for itching and rash.   Neurological: Negative for dizziness, tingling and headaches.   Psychiatric/Behavioral: Negative for depression. The patient does not have insomnia.    All other systems reviewed and are negative.     Physical Exam  Laboratory/Imaging   Hemodynamics  Temp (24hrs), Av.9 °C (98.4 °F), Min:36.6 °C (97.8 °F), Max:37.2 °C (98.9 °F)   Temperature: 36.8 °C (98.2 °F)  Pulse  Av.9  Min: 76  Max: 116    Blood Pressure: 100/59 mmHg      Respiratory      Respiration: 16, Pulse Oximetry: 98 %             Fluids    Intake/Output Summary (Last 24 hours) at 17 0819  Last data filed at 17 2336   Gross per 24 hour   Intake    240 ml   Output      0 ml   Net    240 ml       Nutrition  Orders Placed This Encounter   Procedures   • Diet Order     Standing Status: Standing      Number of Occurrences: 1      Standing Expiration Date:      Order Specific Question:  Diet:     Answer:  Renal [8]     Physical Exam   Constitutional: He is  oriented to person, place, and time. He appears well-developed and well-nourished. No distress.   HENT:   Right Ear: External ear normal.   Left Ear: External ear normal.   Nose: Nose normal.   Eyes: Right eye exhibits no discharge. Left eye exhibits no discharge. No scleral icterus.   Neck: No JVD present. No tracheal deviation present.   Cardiovascular: Normal rate, normal heart sounds and intact distal pulses.    No murmur heard.  Pulmonary/Chest: Effort normal and breath sounds normal. No respiratory distress. He has no wheezes. He has no rales.   Abdominal: Soft. Bowel sounds are normal. He exhibits no distension. There is no tenderness. There is no guarding.   Musculoskeletal: He exhibits no edema or tenderness.   Neurological: He is alert and oriented to person, place, and time.   Skin: Skin is warm and dry. He is not diaphoretic. No erythema.   Psychiatric: He has a normal mood and affect. His behavior is normal.   Nursing note and vitals reviewed.      Recent Labs      07/29/17   0955  07/30/17   0239   WBC  4.8  5.8   RBC  3.13*  3.17*   HEMOGLOBIN  9.6*  9.7*   HEMATOCRIT  29.7*  29.4*   MCV  94.9  92.7   MCH  30.7  30.6   MCHC  32.3*  33.0*   RDW  47.1  44.5   PLATELETCT  180  216   MPV  9.8  10.3     Recent Labs      07/29/17   0955  07/30/17   0239  07/31/17   0116   SODIUM  137  134*  129*   POTASSIUM  3.9  4.7  5.8*   CHLORIDE  92*  92*  86*   CO2  20  26  28   GLUCOSE  78  69  83   BUN  39*  53*  71*   CREATININE  6.64*  11.12*  14.12*   CALCIUM  9.7  8.6  8.3*                      Assessment/Plan     Grand mal seizure (CMS-HCC)  Assessment & Plan  No meds. Prn ativan. If neg eeg consider neurology consult.       Core Measures

## 2017-07-31 NOTE — PROGRESS NOTES
Received report from nightshift RN, assumed care of patient. Patient is A&O x 4, bed alarm not indicated. Patient educated on importance of calling if in need of assistance. Verbalizes understanding. Patient declines pain at this time. Patient updated on plan of care, voices no concerns at this time. Will continue to monitor for safety and comfort.

## 2017-07-31 NOTE — PROGRESS NOTES
"Report received at bedside and assumed care at this time. Currently resting comfortably. C/o PIERRE lower extremity achiness, \"3/10\". Patient states it happened previously post-seizure. Laceration noted - left lateral side. Improvement noted in inflammation/swelling. Patient continues to report tenderness. Ambulating independently. Gait steady. Right AC CDI, +patent/blood return, no erythema/swelling noted. Left arm fistula +bruit/thrill. Verified heart monitor placement, sinus rhythm. Placed call-light within reach. Verified bed wheels locked and seizure protocol initiated. Instructed patient to call for assistance PRN. Hourly rounding performed and will continue to monitor.  "

## 2017-07-31 NOTE — CARE PLAN
Problem: Communication  Goal: The ability to communicate needs accurately and effectively will improve  Intervention: Educate patient and significant other/support system about the plan of care, procedures, treatments, medications and allow for questions  Update pt on POC. Answer questions as needed.      Problem: Pain Management  Goal: Pain level will decrease to patient’s comfort goal  Intervention: Follow pain managment plan developed in collaboration with patient and Interdisciplinary Team  Medicating per MAR.

## 2017-07-31 NOTE — EEG PROGRESS NOTE
EEG 07/31/2017 4:45 PM    ROUTINE ELECTROENCEPHALOGRAM REPORT        INDICATION:     ADMITTED 7/29 FOR SZ.  PT AT DIALYSIS, HAD WITNESSED TONIC/CLONIC SZ W/LOSS OF CONSCIOUSNESS & TONGUE BITING.  HX OF ESRD, SYSTOLIC HEART FAILURE LVEF 40%, HX OF SZ (PT STATES SZ'S ONLY HAPPEN DURING DIALYSIS)    TECHNIQUE: 30 channel routine electroencephalogram (EEG) was performed in accordance with the international 10-20 system. The study was reviewed in bipolar and referential montages. The recording examined the patient during wakeful and drowsy state(s).     DESCRIPTION OF THE RECORD:        Background rhythm during awake stage shows well-organized, well-developed, average voltage 10 to 11 hertz alpha activity in the posterior regions.  It blocks with eye opening and it is bilaterally synchronous and symmetrical.  No spike-and-wave discharges or any lateralizing abnormalities are seen. There are RMTD and prolonged theta bursts afterwards, more when the patient is sleepy but also some when the patient is awake. Photic stimulation did not produce any abnormalities. Hyperventilation did not produce any abnormalities.  No abnormalities were found during the procedure. Stage II sleep was achieved.      ACTIVATION PROCEDURES:      Hyperventilation and Photic Stimulation were done    ICTAL AND/OR INTERICTAL FINDINGS:    No focal or generalized epileptiform activity noted.  There are RMTD and prolonged theta bursts afterwards, more when the patient is sleepy but also some when the patient is awake.  No clinical events or seizures were reported or recorded during the study.      EKG: sampling of the EKG recording demonstrated sinus rhythm.        INTERPRETATION:    ________________________________________________________________________    Non-specific cortical dysfunction more over right? ( prolonged theta bursts)    The clinical significance of this EEG abnormality remains uncertain    Follow up in neurology clinic and or prolonged  EEG in the future may be of help    ________________________________________________________________________        RMTD with tails?

## 2017-07-31 NOTE — CARE PLAN
Problem: Safety  Goal: Will remain free from falls  Outcome: PROGRESSING AS EXPECTED  Call-light within reach. Instructed patient to call for assistance PRN.    Problem: Venous Thromboembolism (VTW)/Deep Vein Thrombosis (DVT) Prevention:  Goal: Patient will participate in Venous Thrombosis (VTE)/Deep Vein Thrombosis (DVT)Prevention Measures  Outcome: PROGRESSING AS EXPECTED  Heparin subcutaneous administered as prescribed    Problem: Pain Management  Goal: Pain level will decrease to patient’s comfort goal  Outcome: PROGRESSING AS EXPECTED  Tylenol given per patient request. Administered as prescribed.

## 2017-07-31 NOTE — PROGRESS NOTES
Amina Walden Fall Risk Assessment:     Last Known Fall: Within the last year  Mobility: No limitations  Medications: Cardiovascular or central nervous system meds  Mental Status/LOC/Awareness: Awake, alert, and oriented to date, place, and person  Toileting Needs: No needs  Volume/Electrolyte Status: No problems  Communication/Sensory: No deficits  Behavior: Appropriate behavior  Amina Walden Fall Risk Total: 3  Fall Risk Level: NO RISK    Universal Fall Precautions:  call light/belongings in reach, wheelchairs and assistive devices out of sight, bed in low position and locked, siderails up x 2, use non-slip footwear, adequate lighting, clutter free and spill free environment, educate on level of risk, educate to call for assistance    Fall Risk Level Interventions:          Patient Specific Interventions:     Medication: not applicable  Mental Status/LOC/Awareness: not applicable  Toileting: not applicable  Volume/Electrolyte Status: not applicable  Communication/Sensory: not applicable  Behavioral: not applicable  Mobility: not applicable

## 2017-07-31 NOTE — PROGRESS NOTES
[facility] NEPHROLOGY PROGRESS NOTE               Author: Ti Rubin Date & Time created: 7/31/2017  9:45 AM     Initial History:  26 yo HM -- ESRD on Tue/Thu/Sat dialysis schedule.  While at dialysis, almost done, had witnessed grand mal seizure with loss of consciousness and tongue biting.  Sent to [facility] ER for evaluation.      Had prior similar seizure, no treatment recommended.  Now with second seizure.  Not typical to get grand mal seizures during dialysis.  (this is only patient I have seen have one on dialysis in 27 years).    Doubt electrolyte imbalance related.    While in ER had complete resolution of symptoms.  BP ok.  Neurology evaluation underway.  Consult to manage ESRD    As noted, patient had a near complete dialysis on day of admission    DAILY NEPHROLOGY SUMMARY  7/29/17 - seen in consultation after dialysis as outpatient.  Seizure workup planned given 2nd time it has happened  7/30/17 - pt feels fine.  Next HD on Tues.  EEG/MRI in works  7/31/17 - K up/Na down - not really following renal restrictions right now while here.  Will run extra treatement today - if had a seizure, would be witnessed here      Review of Systems   Constitutional: Negative.    Eyes: Negative.    Respiratory: Negative.    Cardiovascular: Negative.    Gastrointestinal: Negative.    Skin: Negative.        Physical Exam   Constitutional: He appears well-developed. No distress.   HENT:   Head: Normocephalic.   Eyes: No scleral icterus.   Neck: Normal range of motion.   Cardiovascular: Normal rate.  Exam reveals no friction rub.    Pulmonary/Chest: Effort normal. No respiratory distress.   Abdominal: Soft. There is no tenderness.   Musculoskeletal: Normal range of motion. He exhibits no edema.   Neurological: He is alert. No cranial nerve deficit.   Skin: Skin is warm.   Psychiatric: He has a normal mood and affect.       Labs:        Invalid input(s): [id number]      Recent Labs      07/29/17   0955  07/29/17   1336   17   011   SODIUM  137   --   134*  129*   POTASSIUM  3.9   --   4.7  5.8*   CHLORIDE  92*   --   92*  86*   CO2  20   --   26  28   BUN  39*   --   53*  71*   CREATININE  6.64*   --   11.12*  14.12*   MAGNESIUM  2.2  2.2   --    --    PHOSPHORUS   --   5.1*   --    --    CALCIUM  9.7   --   8.6  8.3*     Recent Labs      179  17   011   ALTSGPT  11  9   --    ASTSGOT  12  16   --    ALKPHOSPHAT  58  58   --    TBILIRUBIN  0.5  0.7   --    GLUCOSE  78  69  83     Recent Labs      17   RBC  3.13*  3.17*   HEMOGLOBIN  9.6*  9.7*   HEMATOCRIT  29.7*  29.4*   PLATELETCT  180  216     Recent Labs      17   WBC  4.8  5.8   NEUTSPOLYS  72.70*  70.90   LYMPHOCYTES  20.40*  19.70*   MONOCYTES  5.00  7.90   EOSINOPHILS  1.30  0.70   BASOPHILS  0.40  0.50   ASTSGOT  12  16   ALTSGPT  11  9   ALKPHOSPHAT  58  58   TBILIRUBIN  0.5  0.7           Hemodynamics:  Temp (24hrs), Av.8 °C (98.3 °F), Min:36.6 °C (97.8 °F), Max:37.2 °C (98.9 °F)  Temperature: 36.7 °C (98 °F)  Pulse  Av.5  Min: 76  Max: 116   Blood Pressure: 115/73 mmHg     Respiratory:    Respiration: 16, Pulse Oximetry: 98 %           Fluids:    Intake/Output Summary (Last 24 hours) at 17 0945  Last data filed at 17 2336   Gross per 24 hour   Intake    240 ml   Output      0 ml   Net    240 ml     Weight: 63.7 kg (140 lb 6.9 oz)  GI/Nutrition:  Orders Placed This Encounter   Procedures   • Diet Order     Standing Status: Standing      Number of Occurrences: 1      Standing Expiration Date:      Order Specific Question:  Diet:     Answer:  Renal [8]     Medical Decision Making, by Problem:  Active Hospital Problems    Diagnosis   • Hypertension [I10]   • Grand mal seizure (CMS-[facility]) [G40.409]   • ESRD on hemodialysis (CMS-HCC) [N18.6, Z99.2]     IMPRESSION/PLAN    # ESRD  -- s/p adequate dialysis on   -- plan for dialysis today    -- maintain on renal diet  -- no BP/IVs or labs on dialysis arm  -- assume eGFR <10 when dosing all meds  -- monitor chems/hgb routinely    # SEIZURE  -- atypical for dialysis patient - syncope common but seizures not  -- work up and management per primary team/neurology    # ANEMIA  -- chronic due to ESRD  -- no overt bleeding  -- manage with EPOGEN and prn Transfusion    # LYTES  -- Na low  -- K up  -- diet/fluid related  -- managed with dialysis                Core Measures

## 2017-08-01 VITALS
WEIGHT: 140.43 LBS | OXYGEN SATURATION: 98 % | HEIGHT: 68 IN | SYSTOLIC BLOOD PRESSURE: 142 MMHG | TEMPERATURE: 97.6 F | RESPIRATION RATE: 18 BRPM | HEART RATE: 66 BPM | DIASTOLIC BLOOD PRESSURE: 89 MMHG | BODY MASS INDEX: 21.28 KG/M2

## 2017-08-01 PROBLEM — R56.9 SEIZURE (HCC): Status: RESOLVED | Noted: 2017-07-30 | Resolved: 2017-08-01

## 2017-08-01 PROBLEM — G40.409 GRAND MAL SEIZURE (HCC): Status: RESOLVED | Noted: 2017-07-29 | Resolved: 2017-08-01

## 2017-08-01 LAB
ANION GAP SERPL CALC-SCNC: 11 MMOL/L (ref 0–11.9)
BUN SERPL-MCNC: 32 MG/DL (ref 8–22)
CALCIUM SERPL-MCNC: 9.2 MG/DL (ref 8.5–10.5)
CHLORIDE SERPL-SCNC: 95 MMOL/L (ref 96–112)
CO2 SERPL-SCNC: 28 MMOL/L (ref 20–33)
CREAT SERPL-MCNC: 9.12 MG/DL (ref 0.5–1.4)
GFR SERPL CREATININE-BSD FRML MDRD: 7 ML/MIN/1.73 M 2
GLUCOSE SERPL-MCNC: 79 MG/DL (ref 65–99)
POTASSIUM SERPL-SCNC: 4.9 MMOL/L (ref 3.6–5.5)
SODIUM SERPL-SCNC: 134 MMOL/L (ref 135–145)

## 2017-08-01 PROCEDURE — 80048 BASIC METABOLIC PNL TOTAL CA: CPT

## 2017-08-01 PROCEDURE — 700102 HCHG RX REV CODE 250 W/ 637 OVERRIDE(OP): Performed by: HOSPITALIST

## 2017-08-01 PROCEDURE — 99239 HOSP IP/OBS DSCHRG MGMT >30: CPT | Performed by: HOSPITALIST

## 2017-08-01 PROCEDURE — 36415 COLL VENOUS BLD VENIPUNCTURE: CPT

## 2017-08-01 PROCEDURE — A9270 NON-COVERED ITEM OR SERVICE: HCPCS | Performed by: HOSPITALIST

## 2017-08-01 PROCEDURE — 700111 HCHG RX REV CODE 636 W/ 250 OVERRIDE (IP): Performed by: HOSPITALIST

## 2017-08-01 RX ORDER — LEVETIRACETAM 500 MG/1
1000 TABLET ORAL ONCE
Status: COMPLETED | OUTPATIENT
Start: 2017-08-01 | End: 2017-08-01

## 2017-08-01 RX ORDER — LEVETIRACETAM 500 MG/1
500 TABLET ORAL 2 TIMES DAILY
Status: DISCONTINUED | OUTPATIENT
Start: 2017-08-01 | End: 2017-08-01 | Stop reason: HOSPADM

## 2017-08-01 RX ORDER — LEVETIRACETAM 500 MG/1
500 TABLET ORAL 2 TIMES DAILY
Qty: 60 TAB | Refills: 2 | Status: SHIPPED | OUTPATIENT
Start: 2017-08-01 | End: 2017-11-28

## 2017-08-01 RX ADMIN — HEPARIN SODIUM 5000 UNITS: 5000 INJECTION, SOLUTION INTRAVENOUS; SUBCUTANEOUS at 05:17

## 2017-08-01 RX ADMIN — ANTACID TABLETS 4000 MG: 500 TABLET, CHEWABLE ORAL at 00:59

## 2017-08-01 RX ADMIN — CALCITRIOL 0.5 MCG: 0.5 CAPSULE, LIQUID FILLED ORAL at 09:20

## 2017-08-01 RX ADMIN — LISINOPRIL 5 MG: 5 TABLET ORAL at 09:18

## 2017-08-01 RX ADMIN — LEVETIRACETAM 1000 MG: 500 TABLET, FILM COATED ORAL at 09:18

## 2017-08-01 RX ADMIN — CARVEDILOL 6.25 MG: 6.25 TABLET, FILM COATED ORAL at 09:18

## 2017-08-01 ASSESSMENT — PAIN SCALES - GENERAL
PAINLEVEL_OUTOF10: 0

## 2017-08-01 ASSESSMENT — ENCOUNTER SYMPTOMS
RESPIRATORY NEGATIVE: 1
CARDIOVASCULAR NEGATIVE: 1
EYES NEGATIVE: 1
GASTROINTESTINAL NEGATIVE: 1
CONSTITUTIONAL NEGATIVE: 1

## 2017-08-01 NOTE — DISCHARGE SUMMARY
CHIEF COMPLAINT ON ADMISSION  Chief Complaint   Patient presents with   • Seizure     witnessed seizure in dialysis this morning       CODE STATUS  Full Code    HPI & HOSPITAL COURSE  This is a 27 y.o. male here with seizure in dialysis. Neurology was consulted and the patient was admitted, with an EEG done demonstrating likely seizure activity. Neurology recommended outpatient follow up and antiepileptics in the meantime. He was loaded with Keppra and started on PO with a plan to follow up outpatient. He had no further seizure activity.     Therefore, he is discharged in good and stable condition with close outpatient follow-up.      DISCHARGE PROBLEM LIST  Active Problems:    Hypertension POA: Yes    ESRD on hemodialysis (CMS-[facility]) POA: Yes  Resolved Problems:    Grand mal seizure (CMS-[facility]) POA: Yes    Seizure (CMS-[facility]) POA: Yes      FOLLOW UP  Future Appointments  Date Time Provider Department Center   8/2/2017 11:40 AM CARE MANAGER [facility]   8/3/2017 9:00 AM DOMINIQUE [facility]   9/5/2017 9:40 AM Patel Munoz M.D. [address]   11/28/2017 9:40 AM Roscoe Keys M.D. [facility] None     Risa Colon M.D.  [address] [address]  [phone]    Schedule an appointment as soon as possible for a visit        MEDICATIONS ON DISCHARGE   Otoniel Wing   Home Medication Instructions NOELLE:[id number]    Printed on:08/01/17 1423   Medication Information                      calcitRIOL (ROCALTROL) 0.5 MCG Cap  Take 1 Cap by mouth 2 Times a Day.             Calcium Carbonate Antacid 1000 MG Chew Tab  Take 4,000 mg by mouth every 6 hours.             carvedilol (COREG) 6.25 MG Tab  Take 1 Tab by mouth 2 times a day, with meals.             Cinacalcet HCl 60 MG Tab  Take 60 mg by mouth every day.             levetiracetam (KEPPRA) 500 MG Tab  Take 1 Tab by mouth 2 Times a Day.             lisinopril (PRINIVIL) 5 MG Tab  Take 1 Tab by mouth every day.                  DIET  Orders Placed This Encounter   Procedures   • Diet Order     Standing Status: Standing      Number of Occurrences: 1      Standing Expiration Date:      Order Specific Question:  Diet:     Answer:  Renal [8]       ACTIVITY  As tolerated.  Weight bearing as tolerated      CONSULTATIONS  Ti Rubin, nephrology    PROCEDURES  EEG    LABORATORY  Lab Results   Component Value Date/Time    SODIUM 134* 08/01/2017 02:17 AM    POTASSIUM 4.9 08/01/2017 02:17 AM    CHLORIDE 95* 08/01/2017 02:17 AM    CO2 28 08/01/2017 02:17 AM    GLUCOSE 79 08/01/2017 02:17 AM    BUN 32* 08/01/2017 02:17 AM    CREATININE 9.12* 08/01/2017 02:17 AM        Lab Results   Component Value Date/Time    WBC 5.8 07/30/2017 02:39 AM    HEMOGLOBIN 9.7* 07/30/2017 02:39 AM    HEMATOCRIT 29.4* 07/30/2017 02:39 AM    PLATELET COUNT 216 07/30/2017 02:39 AM        Total time of the discharge process exceeds 36 minutes

## 2017-08-01 NOTE — PROGRESS NOTES
Bedside report received, assumed pt care @5009. Pt A&Ox4. He denies any pain. POC discussed, he verbalized understanding. Pt appears steady on his feet. Call light within reach

## 2017-08-01 NOTE — CARE PLAN
Problem: Infection  Goal: Will remain free from infection  Outcome: PROGRESSING AS EXPECTED  Discussed with patient the importance of washing hands before and after eating or using the restroom in order to help prevent infection. Patient accepting of the information. All questions answered at this time.

## 2017-08-01 NOTE — PROGRESS NOTES
HD treatment were ordered by Dr. Rubin, pt was able to tolerate HD treatment without any difficulty, pt was able to pull 2.5 liters of fluids. Gave report to Denice CHAVIS, held pt AVF site for 10 min each, Denice CHAVIS verified that pt AVF site were clean dry and intact, no swelling or redness were noted post tx, +B/T pre and post tx. Pt was stable post tx, denies any complaint of pain and SOB, no fever or any distress were noted post tx. Treatment started at 1527 and ended at 1827, see flow sheets for further details.

## 2017-08-01 NOTE — PROGRESS NOTES
[facility] NEPHROLOGY PROGRESS NOTE               Author: Ti Rubin Date & Time created: 8/1/2017  8:33 AM     Initial History:  26 yo HM -- ESRD on Tue/Thu/Sat dialysis schedule.  While at dialysis, almost done, had witnessed grand mal seizure with loss of consciousness and tongue biting.  Sent to [facility] ER for evaluation.      Had prior similar seizure, no treatment recommended.  Now with second seizure.  Not typical to get grand mal seizures during dialysis.  (this is only patient I have seen have one on dialysis in 27 years).    Doubt electrolyte imbalance related.    While in ER had complete resolution of symptoms.  BP ok.  Neurology evaluation underway.  Consult to manage ESRD    As noted, patient had a near complete dialysis on day of admission    DAILY NEPHROLOGY SUMMARY  7/29/17 - seen in consultation after dialysis as outpatient.  Seizure workup planned given 2nd time it has happened  7/30/17 - pt feels fine.  Next HD on Tues.  EEG/MRI in works  7/31/17 - K up/Na down - not really following renal restrictions right now while here.  Will run extra treatement today - if had a seizure, would be witnessed here  8/1/17 - HD last night - no complications.  No renal barriers to DC.      Review of Systems   Constitutional: Negative.    Eyes: Negative.    Respiratory: Negative.    Cardiovascular: Negative.    Gastrointestinal: Negative.    Skin: Negative.        Physical Exam   Constitutional: He appears well-developed. No distress.   HENT:   Head: Normocephalic.   Eyes: No scleral icterus.   Neck: Normal range of motion.   Cardiovascular: Normal rate.  Exam reveals no friction rub.    Pulmonary/Chest: Effort normal. No respiratory distress.   Abdominal: Soft. There is no tenderness.   Musculoskeletal: Normal range of motion. He exhibits no edema.   Neurological: He is alert. No cranial nerve deficit.   Skin: Skin is warm.   Psychiatric: He has a normal mood and affect.       Labs:        Invalid input(s):  [id number]      Recent Labs      17   0955  17   1336  17   0239  17   0116  17   SODIUM  137   --   134*  129*  134*   POTASSIUM  3.9   --   4.7  5.8*  4.9   CHLORIDE  92*   --   92*  86*  95*   CO2  20   --   26  28  28   BUN  39*   --   53*  71*  32*   CREATININE  6.64*   --   11.12*  14.12*  9.12*   MAGNESIUM  2.2  2.2   --    --    --    PHOSPHORUS   --   5.1*   --    --    --    CALCIUM  9.7   --   8.6  8.3*  9.2     Recent Labs      17   0955  17   0239  17   0116  17   ALTSGPT  11  9   --    --    ASTSGOT  12  16   --    --    ALKPHOSPHAT  58  58   --    --    TBILIRUBIN  0.5  0.7   --    --    GLUCOSE  78  69  83  79     Recent Labs      17   0955  17   RBC  3.13*  3.17*   HEMOGLOBIN  9.6*  9.7*   HEMATOCRIT  29.7*  29.4*   PLATELETCT  180  216     Recent Labs      17   WBC  4.8  5.8   NEUTSPOLYS  72.70*  70.90   LYMPHOCYTES  20.40*  19.70*   MONOCYTES  5.00  7.90   EOSINOPHILS  1.30  0.70   BASOPHILS  0.40  0.50   ASTSGOT  12  16   ALTSGPT  11  9   ALKPHOSPHAT  58  58   TBILIRUBIN  0.5  0.7           Hemodynamics:  Temp (24hrs), Av.1 °C (98.7 °F), Min:36.7 °C (98.1 °F), Max:37.3 °C (99.2 °F)  Temperature: 37.2 °C (99 °F)  Pulse  Av  Min: 76  Max: 116   Blood Pressure: 138/88 mmHg     Respiratory:    Respiration: 18, Pulse Oximetry: 96 %           Fluids:    Intake/Output Summary (Last 24 hours) at 17 0801  Last data filed at 17 6204   Gross per 24 hour   Intake    500 ml   Output   2500 ml   Net  -2000 ml     Weight: 63.7 kg (140 lb 6.9 oz)  GI/Nutrition:  Orders Placed This Encounter   Procedures   • Diet Order     Standing Status: Standing      Number of Occurrences: 1      Standing Expiration Date:      Order Specific Question:  Diet:     Answer:  Renal [8]     Medical Decision Making, by Problem:  Active Hospital Problems    Diagnosis   • Hypertension [I10]   •  Grand mal seizure (CMS-HCC) [G40.409]   • ESRD on hemodialysis (CMS-[facility]) [N18.6, Z99.2]     IMPRESSION/PLAN    # ESRD  -- s/p adequate dialysis on 7/29 & 7/31  -- next HD on 8/3  -- maintain on renal diet  -- no BP/IVs or labs on dialysis arm  -- assume eGFR <10 when dosing all meds  -- monitor chems/hgb routinely    # SEIZURE  -- atypical for dialysis patient - syncope common but seizures not  -- work up and management per primary team/neurology    # ANEMIA  -- chronic due to ESRD  -- no overt bleeding  -- manage with EPOGEN and prn Transfusion    # LYTES  -- Na low  -- K up  -- diet/fluid related  -- managed with dialysis                Core Measures

## 2017-08-01 NOTE — PROGRESS NOTES
Amina Walden Fall Risk Assessment:     Last Known Fall: Within the last year  Mobility: No limitations  Medications: Cardiovascular or central nervous system meds  Mental Status/LOC/Awareness: Awake, alert, and oriented to date, place, and person  Toileting Needs: No needs  Volume/Electrolyte Status: No problems  Communication/Sensory: No deficits  Behavior: Appropriate behavior  Amina Walden Fall Risk Total: 3  Fall Risk Level: NO RISK    Universal Fall Precautions:  call light/belongings in reach, bed in low position and locked, wheelchairs and assistive devices out of sight, siderails up x 2, use non-slip footwear, adequate lighting, clutter free and spill free environment, educate on level of risk, educate to call for assistance    Fall Risk Level Interventions:          Patient Specific Interventions:     Medication: review medications with patient and family and assess for medications that can be discontinued or dosage decreased  Mental Status/LOC/Awareness: utilize bed/chair fall alarm and reinforce the use of call light  Toileting: provide frquent toileting  Volume/Electrolyte Status: ensure patient remains hydrated, monitor abnormal lab values and ensure IV fluids are appropriate  Communication/Sensory: update plan of care on whiteboard and ensure proper positioning when transferrng/ambulating  Behavioral: administer medication as ordered  Mobility: dangle prior to standing, utilize bed/chair fall alarm and ensure bed is locked and in lowest position

## 2017-08-01 NOTE — PROGRESS NOTES
Received report and assumed care at this time. Patient  transported from dialysis. Upon arrival to unit patient resting comfortably. AxOx4. Denies pain. Laceration left lateral side - Improvement noted in inflammation/swelling. Patient continues to report tenderness. Right AC CDI, +patent/blood return, no erythema/swelling noted. Left arm fistula +bruit/thrill. No drainage/bleeding noted to site. Verified heart monitor placement, sinus rhythm. Vital signs stable. 6 pm medications given. Placed call-light within reach. Verified bed wheels locked and seizure protocol initiated. Instructed patient to call for assistance PRN. Hourly rounding performed and will continue to monitor.

## 2017-08-01 NOTE — DISCHARGE INSTRUCTIONS
Discharge Instructions    Discharged to home by car with friend. Discharged via wheelchair, hospital escort: Refused.  Special equipment needed: Not Applicable    Be sure to schedule a follow-up appointment with your primary care doctor or any specialists as instructed.     Discharge Plan:   Influenza Vaccine Indication: Not indicated: Previously immunized this influenza season and > 8 years of age    I understand that a diet low in cholesterol, fat, and sodium is recommended for good health. Unless I have been given specific instructions below for another diet, I accept this instruction as my diet prescription.     Special Instructions:   HF Patient Discharge Instructions  · Monitor your weight daily, and maintain a weight chart, to track your weight changes.   · Activity as tolerated, unless your Doctor has ordered otherwise.   · Follow a low fat, low cholesterol, low salt diet unless instructed otherwise by your Doctor. Read the labels on the back of food products and track your intake of fat, cholesterol and salt.   · Fluid Restriction No. If a Fluid Restriction has been ordered by your Doctor, measure fluids with a measuring cup to ensure that you are not exceeding the restriction.   · No smoking.  · Oxygen No. If your Doctor has ordered that you wear Oxygen at home, it is important to wear it as ordered.  · Did you receive an explanation from staff on the importance of taking each of your medications and why it is necessary to keep taking them unless your doctor says to stop? Yes  · Were all of your questions answered about how to manage your heart failure and what to do if you have increased signs and symptoms after you go home? Yes  · Do you feel like your heart failure care team involved you in the care treatment plan and allowed you to make decisions regarding your care while in the hospital and addressed any discharge needs you might have? Yes    See the educational handout provided at discharge for more  information on monitoring your daily weight, activity and diet. This also explains more about Heart Failure, symptoms of a flare-up and some of the tests that you have undergone.     Warning Signs of a Flare-Up include:  · Swelling in the ankles or lower legs.  · Shortness of breath, while at rest, or while doing normal activities.   · Shortness of breath at night when in bed, or coughing in bed.   · Requiring more pillows to sleep at night, or needing to sit up at night to sleep.  · Feeling weak, dizzy or fatigued.     When to call your Doctor:  · Call [facility] seven days a week from 8:00 a.m. to 8:00 p.m. for medical questions [phone].  · Call your Primary Care Physician or Cardiologist if:   · You experience any pain radiating to your jaw or neck.  · You have any difficulty breathing.  · You experience weight gain of 3 lbs in a day or 5 lbs in a week.   · You feel any palpitations or irregular heartbeats.  · You become dizzy or lose consciousness.   If you have had an angiogram or had a pacemaker or AICD placed, and experience:  1. Bleeding, drainage or swelling at the surgical / puncture site.  2. Fever greater than 100.0 F  3. Shock from internal defibrillator.  4. Cool and / or numb extremities.      · Is patient discharged on Warfarin / Coumadin?   No     · Is patient Post Blood Transfusion?  No    Depression / Suicide Risk    As you are discharged from this [facility], it is important to learn how to keep safe from harming yourself.    Recognize the warning signs:  · Abrupt changes in personality, positive or negative- including increase in energy   · Giving away possessions  · Change in eating patterns- significant weight changes-  positive or negative  · Change in sleeping patterns- unable to sleep or sleeping all the time   · Unwillingness or inability to communicate  · Depression  · Unusual sadness, discouragement and loneliness  · Talk of wanting to die  · Neglect of  personal appearance   · Rebelliousness- reckless behavior  · Withdrawal from people/activities they love  · Confusion- inability to concentrate     If you or a loved one observes any of these behaviors or has concerns about self-harm, here's what you can do:  · Talk about it- your feelings and reasons for harming yourself  · Remove any means that you might use to hurt yourself (examples: pills, rope, extension cords, firearm)  · Get professional help from the community (Mental Health, Substance Abuse, psychological counseling)  · Do not be alone:Call your Safe Contact- someone whom you trust who will be there for you.  · Call your local CRISIS HOTLINE 792-4011 or 943-574-8510  · Call your local Children's Mobile Crisis Response Team [place] (303) 580-0075 or www.[employer]  · Call the toll free National Suicide Prevention Hotlines   · National Suicide Prevention Lifeline 923-166-LUTV (0750)  · [employer] 800-SUICIDE (194-0309)    Seizure, Adult  A seizure means there is unusual activity in the brain. A seizure can cause changes in attention or behavior. Seizures often cause shaking (convulsions). Seizures often last from 30 seconds to 2 minutes.  HOME CARE   · If you are given medicines, take them exactly as told by your doctor.  · Keep all doctor visits as told.  · Do not swim or drive until your doctor says it is okay.  · Teach others what to do if you have a seizure. They should:  · Lay you on the ground.  · Put a cushion under your head.  · Loosen any tight clothing around your neck.  · Turn you on your side.  · Stay with you until you get better.  GET HELP RIGHT AWAY IF:   · The seizure lasts longer than 2 to 5 minutes.  · The seizure is very bad.  · The person does not wake up after the seizure.  · The person's attention or behavior changes.  Drive the person to the emergency room or call your local emergency services (836 in [place]  MAKE SURE YOU:   · Understand these  instructions.  · Will watch your condition.  · Will get help right away if you are not doing well or get worse.     This information is not intended to replace advice given to you by your health care provider. Make sure you discuss any questions you have with your health care provider.     Document Released: 06/05/2009 Document Revised: 03/11/2013 Document Reviewed: 12/05/2012  [employer] Interactive Patient Education ©2016 [employer]    End-Stage Kidney Disease  The kidneys are two organs that lie on either side of the spine between the middle of the back and the front of the abdomen. The kidneys:   · Remove wastes and extra water from the blood.    · Produce important hormones. These help keep bones strong, regulate blood pressure, and help create red blood cells.    · Balance the fluids and chemicals in the blood and tissues.  End-stage kidney disease occurs when the kidneys are so damaged that they cannot do their job. When the kidneys cannot do their job, life-threatening problems occur. The body cannot stay clean and strong without the help of the kidneys. In end-stage kidney disease, the kidneys cannot get better. You need a new kidney or treatments to do some of the work healthy kidneys do in order to stay alive.  CAUSES   End-stage kidney disease usually occurs when a long-lasting (chronic) kidney disease gets worse. It may also occur after the kidneys are suddenly damaged (acute kidney injury).   SYMPTOMS   · Swelling (edema) of the legs, ankles, or feet.    · Tiredness (lethargy).    · Nausea or vomiting.    · Confusion.    · Problems with urination, such as:    ¨ Decreased urine production.    ¨ Frequent urination, especially at night.    ¨ Frequent accidents in children who are potty trained.    · Muscle twitches and cramps.    · Persistent itchiness.    · Loss of appetite.    · Headaches.    · Abnormally dark or light skin.    · Numbness in the hands or feet.    · Easy bruising.    · Frequent hiccups.     · Menstruation stops.  DIAGNOSIS   Your health care provider will measure your blood pressure and take some tests. These may include:   · Urine tests.    · Blood tests.    · Imaging tests, such as:    ¨ An ultrasound exam.    ¨ Computed tomography (CT).  · A kidney biopsy.  TREATMENT   There are two treatments for end-stage kidney disease:   · A procedure that removes toxic wastes from the body (dialysis).    · Receiving a new kidney (kidney transplant).  Both of these treatments have serious risks and consequences. Your health care provider will help you determine which treatment is best for you based on your health, age, and other factors. In addition to having dialysis or a kidney transplant, you may need to take medicines to control high blood pressure (hypertension) and cholesterol and to decrease phosphorus levels in your blood.   HOME CARE INSTRUCTIONS  · Follow your prescribed diet.    · Take medicines only as directed by your health care provider.    · Do not take any new medicines (prescription, over-the-counter, or nutritional supplements) unless approved by your health care provider. Many medicines can worsen your kidney damage or need to have the dose adjusted.    · Keep all follow-up visits as directed by your health care provider.  MAKE SURE YOU:  · Understand these instructions.  · Will watch your condition.  · Will get help right away if you are not doing well or get worse.     This information is not intended to replace advice given to you by your health care provider. Make sure you discuss any questions you have with your health care provider.     Document Released: 03/09/2005 Document Revised: 01/08/2016 Document Reviewed: 08/16/2013  [employer] Interactive Patient Education ©2016 [employer].

## 2017-08-01 NOTE — PROGRESS NOTES
Pt discharged home. IV dc'd, tip intact. No s/s of infection or bleeding. Discharge instructions discussed, he verbalized understanding and denied questions. Pt ambulated off unit by self. All bedside belongings sent with pt.

## 2017-08-01 NOTE — PROGRESS NOTES
Received report from Racquel rivas RN. Assumed care of patient. Patient is resting in bed with no family present at this time. Patient declines any needs at this time. Bed locked and in the lowest position with call light within reach.

## 2017-08-02 ENCOUNTER — PATIENT OUTREACH (OUTPATIENT)
Dept: HEALTH INFORMATION MANAGEMENT | Facility: OTHER | Age: 27
End: 2017-08-02

## 2017-08-02 NOTE — PROGRESS NOTES
· 8/2/17 at 11:40 AM--Placed discharge outreach phone call to patient s/p hospital discharge 8/1/17.  Received recording stating that pt's voice mailbox is not set up.  No answer, no option to leave voicemail.    · 8/2/17 at 1:51 PM--Second attempt at discharge outreach with pt.  Spoke with pt, discharge outreach completed.

## 2017-11-28 ENCOUNTER — OFFICE VISIT (OUTPATIENT)
Dept: NEUROLOGY | Facility: MEDICAL CENTER | Age: 27
End: 2017-11-28
Payer: COMMERCIAL

## 2017-11-28 VITALS
TEMPERATURE: 97.3 F | DIASTOLIC BLOOD PRESSURE: 58 MMHG | OXYGEN SATURATION: 100 % | RESPIRATION RATE: 18 BRPM | WEIGHT: 134.5 LBS | HEIGHT: 67 IN | HEART RATE: 68 BPM | BODY MASS INDEX: 21.11 KG/M2 | SYSTOLIC BLOOD PRESSURE: 140 MMHG

## 2017-11-28 DIAGNOSIS — G25.3 MYOCLONUS: ICD-10-CM

## 2017-11-28 DIAGNOSIS — Z13.31 SCREENING FOR DEPRESSION: ICD-10-CM

## 2017-11-28 DIAGNOSIS — G40.109 LOCALIZATION-RELATED EPILEPSY (HCC): ICD-10-CM

## 2017-11-28 PROCEDURE — 99215 OFFICE O/P EST HI 40 MIN: CPT | Performed by: PSYCHIATRY & NEUROLOGY

## 2017-11-28 RX ORDER — LEVETIRACETAM 500 MG/1
TABLET ORAL
Qty: 90 TAB | Refills: 11 | Status: SHIPPED | OUTPATIENT
Start: 2017-11-28 | End: 2018-03-29 | Stop reason: SDUPTHER

## 2017-11-28 NOTE — NON-PROVIDER
Neurology Medical Student Note  Note Author: Stacia Bear, Student    Name Otoniel Wing 1990   Age/Sex 27 y.o. male   MRN [id number]   Code Status        Reason for visit  (Principal Problem)     Mr. Wing is a 28 yo right-handed male with history of congestive heart failure and ESRD on dialysis (hemodialysis 3x per week) for 12 years. He is here following a hospital admission for a seizure during dialysis in August. He states he has had two episodes of seizures over the past year both during dialysis. He denies any previous history of trauma or seizures. During the most recent episode he was admitted and an EEG was done that showed likely seizure activity. MRI of the brain done at this admission was normal. He has been taking Keppra since admission but ran out one week ago and has not been able to get it refilled. He states he has not had any seizures since beginning Keppra but has felt like he might have one during dialysis and has interrupted dialysis a couple times when he gets this feeling. He denies any side effects with Keppra. He endorses having sleep issues including sleep paralysis and night terrors that are worst when he sleeps near a door. He gets episodes every few weeks that last about 3-4 days. He has recently started sleeping on the floor of his living room to relieve these episodes. He denies any use of medications for sleep or mood disorders. He denies use of an automobile. He denies tobacco use and endorses EtOH only rarely. He denies use of marijuana or other drugs. He denies any family history of medical conditions including seizure disorders. He takes medication for hypertension and ESRD as listed below. He endorses an allergy to vancomycin.      Current Outpatient Prescriptions:   •  Patiromer Sorbitex Calcium (VELTASSA PO), Take  by mouth., Disp: , Rfl:   •  Sucroferric Oxyhydroxide (VELPHORO) 500 MG Chew Tab, Take  by mouth., Disp: , Rfl:   •   "levetiracetam (KEPPRA) 500 MG Tab, 1 tab po bid, and also on HD day, take an extra tab right after dialysis., Disp: 90 Tab, Rfl: 11  •  lisinopril (PRINIVIL) 5 MG Tab, Take 1 Tab by mouth every day., Disp: 90 Tab, Rfl: 3  •  carvedilol (COREG) 6.25 MG Tab, Take 1 Tab by mouth 2 times a day, with meals., Disp: 60 Tab, Rfl: 11      ROS    Constitutional: denies fever, night sweats, weight loss.    Eyes: denies acute vision change, eye pain or secretion.    Ears, Nose, Mouth, and Throat: denies nasal secretion, nasal bleeding, difficulty swallowing, hearing loss, tinnitus, vertigo, ear pain, oral ulcers or lesions.    Endocrine: denies recent weight changes, heat or cold intolerance, polyuria, polydipsia, polyphagia, abnormal hair growth.  Cardiovascular: denies new onset of chest pain, recent palpitations, lower extremity edema, or dyspnea of exertion.  Pulmonary: denies shortness of breath, new onset of cough, hemoptysis, wheezing, chest pain or flu-like symptoms.    GI: denies nausea, vomiting, diarrhea, GI bleeding, change in appetite, abdominal pain, and change in bowel habits.  : denies urinary incontinence, retention or hematuria.  Heme/oncology: denies history of easy bruising or bleeding. No history of cancer.    Allergy/immunology: denies hives/urticarial.  Dermatologic: denies new rash.  Musculoskeletal: denies joint swelling or pain, muscle pain, neck and back pain.    Neurologic: denies headaches, acute visual changes, facial droopiness, muscle weakness (focal or generalized), paresthesias, anesthesia, ataxia, change in speech or language, seizures, memory loss.  Psychiatric: denies symptoms of depression, anxiety, hallucinations, mood swings or changes, suicidal         Physical Exam       Vitals:    11/28/17 0921   BP: 140/58   Pulse: 68   Resp: 18   Temp: 36.3 °C (97.3 °F)   SpO2: 100%   Weight: 61 kg (134 lb 8 oz)   Height: 1.702 m (5' 7\")     Body mass index is 21.07 kg/m². Weight: 61 kg (134 lb 8 " oz)  Oxygen Therapy:  Pulse Oximetry: 100 %    Physical Exam    General: Patient in no acute distress, pleasant and cooperative.  HEENT: Normocephalic, no signs of acute trauma.    Neck: supple, no meningeal signs or carotid bruits. Scar present on R lateral cheek and neck. There is normal range of motion. No tenderness on exam.    Chest: clear to auscultation. No cough.    CV: RRR, no murmurs.    Skin: no signs of acute rashes or trauma.    Musculoskeletal: joints exhibit full range of motion, without any pain to palpation. There are no signs of joint or muscle swelling. There is no tenderness to deep palpation of muscles.    Psychiatric: No hallucinatory behavior. Denies symptoms of mood change or suicidal ideation. Mood and affect slightly depressed.  Neurological exam:    Mental status, orientation: Awake, alert and fully oriented.    Speech and language: speech is clear and fluent. The patient is able to name, repeat and comprehend.    Memory: There is intact recollection of recent and remote events.    Cranial nerve exam: Pupils are 3-4 mm bilaterally and equally reactive to light and accommodation. Visual fields are intact by confrontation. There is no nystagmus on primary or secondary gaze. Intact full EOM in all directions of gaze. Face appears symmetric. Sensation in the face is intact to light touch. Uvula is midline. Palate elevates symmetrically. Tongue is midline and without any signs of tongue biting or fasciculations. Sternocleidomastoid muscles exhibit is normal strength bilaterally. Shoulder shrug is intact bilaterally.    Motor exam: Strength is 5/5 in all extremities. Tone is normal. No abnormal movements were seen on exam.    Sensory exam: reveals normal sense of light touch, pain, vibration and proprioception in all extremities.    Deep tendon reflexes:  2+ throughout. Plantar responses are flexor. There is no clonus.    Coordination: shows a normal finger-nose-finger. Normal rapidly  alternating movements.    Gait: The patient was able to get up from seated position on first attempt without requiring assistance. Found to be steady when walking. Movements were fluid with normal arm swing. The patient was able to turn without difficulties or tendency to fall. Romberg exam was unremarkable.        Assessment/Plan     #Seizure disorder  -increase Keppra daily dosage and include extra dose following dialysis  -complete 24 hour ambulatory EEG monitoring  -patient instructed to complete ambulatory EEG on day that he does dialysis to ensure monitoring of brain activity during dialysis  -f/u in 4 months to reassess seizure control and current medication regimen    #Sleep disorder  -narcolepsy unlikely due to night terror symptoms  -patient does not take any medications that increase likelihood of night terrors, consider looking into other causes if condition worsens or causes further decrease in quality of life  -instruct patient to continue current sleep habits as they are providing some relief  -continue to monitor for changes or worsening of sleep issues  -consider providing sleep hygiene educational materials in future

## 2017-11-29 NOTE — PROGRESS NOTES
Chief Complaint   Patient presents with   • Establish Care     Seizure        Problem List Items Addressed This Visit     None      Visit Diagnoses     Localization-related epilepsy (CMS-HCC)        Relevant Medications    levetiracetam (KEPPRA) 500 MG Tab    Other Relevant Orders    REFERRAL TO NEURODIAGNOSTICS (EEG,EP,EMG/NCS/DBS) Modality Requested: Ambulatory EEG (24 hrs amb )    Myoclonus        Screening for depression              History of present illness:  Mr. Wing is a 28 yo right-handed male with history of congestive heart failure and ESRD on dialysis (hemodialysis 3x per week) for 12 years. The reason for his CKD is unknown. He is here following a hospital admission for a possible seizure during dialysis in July 2017. He states he has had two episodes of possible seizures over the past year both during dialysis. He as first seen in the office by LEIGH Floyd. He denies any previous history of trauma or seizures. During the most recent episode he was admitted and an EEG was done that showed likely seizure activity. MRI of the brain done at this admission was normal. He has been taking Keppra since admission but ran out one week ago and has not been able to get it refilled. He states he has not had any seizures since beginning Keppra but has felt like he might have one during dialysis and has interrupted dialysis a couple times when he gets this feeling. He denies any side effects with Keppra. He endorses having sleep issues related to night terrors that are worst when he sleeps near a door. This have been present for years. He gets episodes every few weeks that last about 3-4 days. He has recently started sleeping on the floor of his living room to relieve these episodes. He denies any use of medications for sleep or mood disorders. He denies use of an automobile. He denies tobacco use and endorses EtOH only rarely. He denies use of marijuana or other drugs. He denies any family  history of medical conditions including seizure disorders. He takes medication for hypertension and ESRD as listed below. He endorses an allergy to vancomycin.     He would like to resume keppra.     His mood is good and denies suicidal or homicidal ideation.     He does not drive.     There is no h/o stroke, brain tumors, or TBI.         Past medical history:   Past Medical History:   Diagnosis Date   • Anesthesia     PONV-non recently   • Breath shortness     with chf exacerbation/fluid overload   • Changing skin lesion 8/17/2009   • Chest pain 11/18/2014   • CHF (congestive heart failure) (CMS-HCC) 1/27/2014   • Encounter for renal dialysis     Tues, Thurs, Sat   • History of anemia     due to renal function   • Hypertension    • Indigestion    • Pain     bone pain due to parathyroid   • Pneumonia 8/2015   • Renal disorder L arm fistula   • Renal failure        Past surgical history:   Past Surgical History:   Procedure Laterality Date   • PARATHYROID EXPLORATION  8/10/2016    Procedure: PARATHYROID EXPLORATION with BILATERAL NIMS NERVE monitoring and cervical thymectomy ;  Surgeon: Topher Ramirez M.D.;  Location: SURGERY SAME DAY [facility];  Service:    • AV FISTULA CREATION  12/22/2014    Performed by Anselmo Felix M.D. at SURGERY [facility]   • AV FISTULA REVISION  10/28/2013    Performed by Anselmo Felix M.D. at SURGERY [facility]   • APPENDECTOMY LAPAROSCOPIC  11/10/2009    Performed by VLADISLAV PALACIO at SURGERY [facility]   • AV FISTULA REVISION  10/13/2009    Performed by ANSELMO FELIX at SURGERY [facility]   • CATH PLACEMENT  10/13/2009    Performed by ANSELMO FELIX at [facility]   • AV FISTULA CREATION  2005    left arm       Family history:   Family History   Problem Relation Age of Onset   • Depression Mother        Social history:   Social History     Social History   • Marital status: Single     Spouse name: N/A   • Number of children: N/A   • Years of  education: N/A     Occupational History   • Not on file.     Social History Main Topics   • Smoking status: Former Smoker     Packs/day: 0.25     Years: 8.00     Quit date: 11/28/2016   • Smokeless tobacco: Never Used   • Alcohol use Yes      Comment: 3 drinks 2 x per month   • Drug use: No   • Sexual activity: No     Other Topics Concern   • Not on file     Social History Narrative   • No narrative on file       Current medications:   Current Outpatient Prescriptions   Medication   • Patiromer Sorbitex Calcium (VELTASSA PO)   • Sucroferric Oxyhydroxide (VELPHORO) 500 MG Chew Tab   • levetiracetam (KEPPRA) 500 MG Tab   • lisinopril (PRINIVIL) 5 MG Tab   • carvedilol (COREG) 6.25 MG Tab     No current facility-administered medications for this visit.        Medication Allergy:  Allergies   Allergen Reactions   • Bloodless    • Vancomycin Itching     Pt states itching.         ROS     Constitutional:   denies fever, night sweats, weight loss.    Eyes:   denies acute vision change, eye pain or secretion.    Ears, Nose, Mouth, and Throat:   denies nasal secretion, nasal bleeding, difficulty swallowing, hearing loss, tinnitus, vertigo, ear pain, oral ulcers or lesions.    Endocrine:   denies recent weight changes, heat or cold intolerance, polyuria, polydipsia, polyphagia, abnormal hair growth.  Cardiovascular:   denies new onset of chest pain, recent palpitations, lower extremity edema, or dyspnea of exertion.  Pulmonary:   denies shortness of breath, new onset of cough, hemoptysis, wheezing, chest pain or flu-like symptoms.    GI:   denies nausea, vomiting, diarrhea, GI bleeding, change in appetite, abdominal pain, and change in bowel habits.  :   denies urinary incontinence, retention or hematuria.  Heme/oncology:   denies history of easy bruising or bleeding. No history of cancer.    Allergy/immunology  : denies hives/urticarial.  Dermatologic:   denies new rash.  Musculoskeletal:   denies joint swelling or pain,  "muscle pain, neck and back pain.    Neurologic:   denies headaches, acute visual changes, facial droopiness, muscle weakness (focal or generalized), paresthesias, anesthesia, ataxia, change in speech or language, seizures, memory loss.  Psychiatric:   denies symptoms of depression, anxiety, hallucinations, mood swings or changes, suicidal       Physical examination:   Vitals:    11/28/17 0921   BP: 140/58   Pulse: 68   Resp: 18   Temp: 36.3 °C (97.3 °F)   SpO2: 100%   Weight: 61 kg (134 lb 8 oz)   Height: 1.702 m (5' 7\")     General:   Patient in no acute distress, pleasant and cooperative.  HEENT:   Normocephalic, no signs of acute trauma.    Neck:   supple, no meningeal signs or carotid bruits. Scar present on R lateral cheek and neck. There is normal range of motion. No tenderness on exam.    Chest:   clear to auscultation. No cough.    CV:   RRR, no murmurs.    Skin:   no signs of acute rashes or trauma.    Musculoskeletal:   joints exhibit full range of motion, without any pain to palpation. There are no signs of joint or muscle swelling. There is no tenderness to deep palpation of muscles.    Psychiatric:   No hallucinatory behavior. Denies symptoms of mood change or suicidal ideation. Mood and affect slightly depressed.  Neurological exam:      Mental status, orientation:   Awake, alert and fully oriented.    Speech and language:   speech is clear and fluent. The patient is able to name, repeat and comprehend.    Memory:   There is intact recollection of recent and remote events.    Cranial nerve exam:   Pupils are 3-4 mm bilaterally and equally reactive to light and accommodation. Visual fields are intact by confrontation. There is no nystagmus on primary or secondary gaze. Intact full EOM in all directions of gaze. Face appears symmetric. Sensation in the face is intact to light touch. Uvula is midline. Palate elevates symmetrically. Tongue is midline and without any signs of tongue biting or " fasciculations. Sternocleidomastoid muscles exhibit is normal strength bilaterally. Shoulder shrug is intact bilaterally.    Motor exam:   Strength is 5/5 in all extremities. Tone is normal. No abnormal movements were seen on exam.    Sensory exam:   reveals normal sense of light touch, pain, vibration and proprioception in all extremities.    Deep tendon reflexes:    2+ throughout. Plantar responses are flexor. There is no clonus.    Coordination:   shows a normal finger-nose-finger. Normal rapidly alternating movements.    Gait:   The patient was able to get up from seated position on first attempt without requiring assistance. Found to be steady when walking. Movements were fluid with normal arm swing. The patient was able to turn without difficulties or tendency to fall. Romberg exam was unremarkable.        ANCILLARY DATA REVIEWED:       Lab Data Review:  Reviewed in chart.     Records reviewed:   Chart reviewed.      Imaging:   MRI brain wo, 7/29/17:  1. MRI OF THE BRAIN WITHOUT CONTRAST WITHIN NORMAL LIMITS.  2. NO EVIDENCE OF MASS LESION, HETEROTOPIC GRAY MATTER, GROSS CORTICAL DYSPLASIA, OR MESIAL TEMPORAL SCLEROSIS.      EEG, 7/31/17:  Non-specific cortical dysfunction more over right? ( prolonged theta bursts)  The clinical significance of this EEG abnormality remains uncertain  Follow up in neurology clinic and or prolonged EEG in the future may be of help          ASSESSMENT AND PLAN:    1. Localization-related epilepsy (CMS-HCC)  - REFERRAL TO NEURODIAGNOSTICS (EEG,EP,EMG/NCS/DBS) Modality Requested: Ambulatory EEG (24 hrs amb )    2. Myoclonus      3. Screening for depression      CLINICAL DISCUSSION:   Uncertain about nature of spells. For what he describes, some myoclonus and he is confused after but aware of the spells. No h/o GTC seizures. Possible abnormal EEG. Myoclonus can be metabolic, particularly during dialysis. I have no objection he continues on Keppra, as he reports improvement with it,  we will renally adjust to 500 mg q 12 hrs with additional doses right after HD. He agrees to undergo 24 hrs EEG amb.   He does not drive.       FOLLOW-UP:   Return in about 3 months (around 2/28/2018).        EDUCATION AND COUNSELING:  -Education was provided to the patient and/or family regarding diagnosis and prognosis. The chronic and unpredictable nature of the condition were discussed. There is increased risk for additional events, which may carry potential for significant injuries and death. Discussed frequent seizure triggers: sleep deprivation, medication non-compliance, use of illegal drugs/alcohol, stress, and others.   -We reviewed in detail the current antiepileptic regimen. Potential side effects of antiepileptics were discussed at length, including but no limited to: hypersensitivity reactions (rash and others, some of which can be fatal), visual field changes (some of which may be irreversible), glaucoma, diplopia, kidney stones, osteopenia/osteoporosis/bone fractures, hyperthermia/anhydrosis, hyponatremia, tremors/abnormal movements, ataxia, dizziness, fatigue, increased risk for falls, risk for cardiac arrhythmias/syncope, gastrointestinal side effects(hepatitis, pancreatitis, gastritis, ulcers), gingival hypertrophy/bleeding, drowsiness, sedation, anxiety/nervousness, increased risk for suicide, increased risk for depression, and psychosis.   -We also reviewed drug-drug interactions and their potential effect on seizure control and medication side effects.    -Recommend chronic vitamin D supplementation and regular exercise (if not contraindicated).   -Patient/family educated on risk for SUDEP (Sudden Death in Epilepsy). Counseling was provided on the importance of strict medication and follow up compliance. The patient/family understand the risks associated with non-adherence with the medical plan as outlined, including but not limited to an increased risk for breakthrough seizures, which may  contribute to injuries, disability, status epilepticus, and even death.   -Counseling was also provided on potential effects of alcohol and other drugs, which may lower seizure threshold and/or affect the metabolism of antiepileptic drugs. We recommend avoidance of alcohol and illegal drugs.  -Avoid sleep deprivation.   -We extensively discussed the aspects related to safety in drivers who suffer from epilepsy. The patient is encourage to report to the Division of Motor Vehicles of any condition and/or spells related to confusion, disorientation, and/or loss of awareness and/or loss of consciousness; as these may pose a safety issue if they occur while operating a motor vehicle. The patient and/or family are ultimately responsible for exercising caution and abiding to regulations in place. He does not drive.   -Other seizure precautions were discussed at length, including no diving, no skydiving, no climbing or exposure to unprotected heights, no unsupervised swimming, no Jacuzzi or bathing in bathtubs or deep bodies of water. The patient/family have been advised about risks for operating any machinery while suffering from seizures / syncope / epilepsy and/or while taking antiepileptic drugs.     Patient/family agree with plan, as outlined.         Roscoe Keys MD  Medical Director, Epilepsy and Neurodiagnostics.   Clinical  of Neurology [facility].   Diplomate in Neurology, Epilepsy, and Electrodiagnostic Medicine.   Office: [phone]  Fax: [phone]      BILLING DOCUMENTATION:   (a) Counseling:  I spent greater than 50% time face-to-face time of a total of 60 mins visit. Over 50% of the time of the visit today was spent on counseling and or coordination of care wtih the patient/family, with greater than 50% of the total time discussing:   o Diagnostic results, impressions, and/or recommended diagnostic studies, and coordination of care.    o Prognosis.  o Treatment recommendations, including risks, benefits, & alternatives.  o Instructions for treatment/management and/or follow-up.  o Importance of compliance with chosen treatment/management options.  o Risk factor modification.   o Patient & family education.  o Provided business card and/or instructions for follow-up & emergencies.

## 2018-01-01 ENCOUNTER — HOSPITAL ENCOUNTER (EMERGENCY)
Facility: MEDICAL CENTER | Age: 28
End: 2018-10-16
Attending: EMERGENCY MEDICINE
Payer: COMMERCIAL

## 2018-01-01 ENCOUNTER — HOSPITAL ENCOUNTER (OUTPATIENT)
Facility: MEDICAL CENTER | Age: 28
End: 2018-12-10
Attending: SURGERY | Admitting: SURGERY
Payer: COMMERCIAL

## 2018-01-01 VITALS
TEMPERATURE: 97.5 F | HEART RATE: 83 BPM | RESPIRATION RATE: 15 BRPM | DIASTOLIC BLOOD PRESSURE: 75 MMHG | OXYGEN SATURATION: 98 % | HEIGHT: 67 IN | WEIGHT: 139.55 LBS | BODY MASS INDEX: 21.9 KG/M2 | SYSTOLIC BLOOD PRESSURE: 121 MMHG

## 2018-01-01 VITALS
HEIGHT: 67 IN | RESPIRATION RATE: 21 BRPM | DIASTOLIC BLOOD PRESSURE: 86 MMHG | HEART RATE: 96 BPM | OXYGEN SATURATION: 100 % | WEIGHT: 141.31 LBS | TEMPERATURE: 97.5 F | BODY MASS INDEX: 22.18 KG/M2 | SYSTOLIC BLOOD PRESSURE: 175 MMHG

## 2018-01-01 DIAGNOSIS — G89.18 POST-OPERATIVE PAIN: ICD-10-CM

## 2018-01-01 DIAGNOSIS — Z01.810 PRE-OPERATIVE CARDIOVASCULAR EXAMINATION: ICD-10-CM

## 2018-01-01 DIAGNOSIS — Z78.9 PROBLEM WITH VASCULAR ACCESS: ICD-10-CM

## 2018-01-01 DIAGNOSIS — Z01.812 PRE-OPERATIVE LABORATORY EXAMINATION: ICD-10-CM

## 2018-01-01 LAB
ANION GAP SERPL CALC-SCNC: 18 MMOL/L (ref 0–11.9)
BUN SERPL-MCNC: 61 MG/DL (ref 8–22)
CALCIUM SERPL-MCNC: 7.5 MG/DL (ref 8.5–10.5)
CHLORIDE SERPL-SCNC: 103 MMOL/L (ref 96–112)
CO2 SERPL-SCNC: 21 MMOL/L (ref 20–33)
CREAT SERPL-MCNC: 11.9 MG/DL (ref 0.5–1.4)
EKG IMPRESSION: NORMAL
ERYTHROCYTE [DISTWIDTH] IN BLOOD BY AUTOMATED COUNT: 49.3 FL (ref 35.9–50)
GLUCOSE SERPL-MCNC: 84 MG/DL (ref 65–99)
HCT VFR BLD AUTO: 36 % (ref 42–52)
HCT VFR BLD CALC: 32 % (ref 42–52)
HGB BLD-MCNC: 10.9 G/DL (ref 14–18)
HGB BLD-MCNC: 11.3 G/DL (ref 14–18)
MCH RBC QN AUTO: 31.3 PG (ref 27–33)
MCHC RBC AUTO-ENTMCNC: 31.4 G/DL (ref 33.7–35.3)
MCV RBC AUTO: 99.7 FL (ref 81.4–97.8)
PLATELET # BLD AUTO: 169 K/UL (ref 164–446)
PMV BLD AUTO: 10.4 FL (ref 9–12.9)
POTASSIUM BLD-SCNC: 4.5 MMOL/L (ref 3.6–5.5)
POTASSIUM SERPL-SCNC: 4.5 MMOL/L (ref 3.6–5.5)
POTASSIUM SERPL-SCNC: 4.6 MMOL/L (ref 3.6–5.5)
RBC # BLD AUTO: 3.61 M/UL (ref 4.7–6.1)
SODIUM BLD-SCNC: 144 MMOL/L (ref 135–145)
SODIUM SERPL-SCNC: 142 MMOL/L (ref 135–145)
WBC # BLD AUTO: 3.7 K/UL (ref 4.8–10.8)

## 2018-01-01 PROCEDURE — 84295 ASSAY OF SERUM SODIUM: CPT

## 2018-01-01 PROCEDURE — 80048 BASIC METABOLIC PNL TOTAL CA: CPT

## 2018-01-01 PROCEDURE — 93010 ELECTROCARDIOGRAM REPORT: CPT | Performed by: INTERNAL MEDICINE

## 2018-01-01 PROCEDURE — A9270 NON-COVERED ITEM OR SERVICE: HCPCS | Performed by: ANESTHESIOLOGY

## 2018-01-01 PROCEDURE — 160046 HCHG PACU - 1ST 60 MINS PHASE II: Performed by: SURGERY

## 2018-01-01 PROCEDURE — A6402 STERILE GAUZE <= 16 SQ IN: HCPCS | Performed by: SURGERY

## 2018-01-01 PROCEDURE — 160036 HCHG PACU - EA ADDL 30 MINS PHASE I: Performed by: SURGERY

## 2018-01-01 PROCEDURE — 160048 HCHG OR STATISTICAL LEVEL 1-5: Performed by: SURGERY

## 2018-01-01 PROCEDURE — 93005 ELECTROCARDIOGRAM TRACING: CPT

## 2018-01-01 PROCEDURE — 160029 HCHG SURGERY MINUTES - 1ST 30 MINS LEVEL 4: Performed by: SURGERY

## 2018-01-01 PROCEDURE — 85027 COMPLETE CBC AUTOMATED: CPT

## 2018-01-01 PROCEDURE — 160025 RECOVERY II MINUTES (STATS): Performed by: SURGERY

## 2018-01-01 PROCEDURE — 700101 HCHG RX REV CODE 250

## 2018-01-01 PROCEDURE — 501837 HCHG SUTURE CV: Performed by: SURGERY

## 2018-01-01 PROCEDURE — 85014 HEMATOCRIT: CPT

## 2018-01-01 PROCEDURE — 700111 HCHG RX REV CODE 636 W/ 250 OVERRIDE (IP)

## 2018-01-01 PROCEDURE — 84132 ASSAY OF SERUM POTASSIUM: CPT

## 2018-01-01 PROCEDURE — 160009 HCHG ANES TIME/MIN: Performed by: SURGERY

## 2018-01-01 PROCEDURE — 700111 HCHG RX REV CODE 636 W/ 250 OVERRIDE (IP): Performed by: ANESTHESIOLOGY

## 2018-01-01 PROCEDURE — 160035 HCHG PACU - 1ST 60 MINS PHASE I: Performed by: SURGERY

## 2018-01-01 PROCEDURE — 99283 EMERGENCY DEPT VISIT LOW MDM: CPT

## 2018-01-01 PROCEDURE — 160041 HCHG SURGERY MINUTES - EA ADDL 1 MIN LEVEL 4: Performed by: SURGERY

## 2018-01-01 PROCEDURE — 160002 HCHG RECOVERY MINUTES (STAT): Performed by: SURGERY

## 2018-01-01 PROCEDURE — 36415 COLL VENOUS BLD VENIPUNCTURE: CPT

## 2018-01-01 PROCEDURE — 501838 HCHG SUTURE GENERAL: Performed by: SURGERY

## 2018-01-01 PROCEDURE — 700102 HCHG RX REV CODE 250 W/ 637 OVERRIDE(OP): Performed by: ANESTHESIOLOGY

## 2018-01-01 RX ORDER — HYDROMORPHONE HYDROCHLORIDE 1 MG/ML
0.1 INJECTION, SOLUTION INTRAMUSCULAR; INTRAVENOUS; SUBCUTANEOUS
Status: DISCONTINUED | OUTPATIENT
Start: 2018-01-01 | End: 2018-01-01 | Stop reason: HOSPADM

## 2018-01-01 RX ORDER — OXYCODONE HCL 5 MG/5 ML
10 SOLUTION, ORAL ORAL
Status: COMPLETED | OUTPATIENT
Start: 2018-01-01 | End: 2018-01-01

## 2018-01-01 RX ORDER — HYDROCODONE BITARTRATE AND ACETAMINOPHEN 5; 325 MG/1; MG/1
1-2 TABLET ORAL EVERY 4 HOURS PRN
Qty: 10 TAB | Refills: 0 | Status: SHIPPED | OUTPATIENT
Start: 2018-01-01 | End: 2018-01-01

## 2018-01-01 RX ORDER — LIDOCAINE HYDROCHLORIDE 10 MG/ML
INJECTION, SOLUTION INFILTRATION; PERINEURAL
Status: COMPLETED
Start: 2018-01-01 | End: 2018-01-01

## 2018-01-01 RX ORDER — SODIUM CHLORIDE 9 MG/ML
INJECTION, SOLUTION INTRAVENOUS ONCE
Status: COMPLETED | OUTPATIENT
Start: 2018-01-01 | End: 2018-01-01

## 2018-01-01 RX ORDER — HEPARIN SODIUM,PORCINE 1000/ML
VIAL (ML) INJECTION
Status: DISCONTINUED | OUTPATIENT
Start: 2018-01-01 | End: 2018-01-01 | Stop reason: HOSPADM

## 2018-01-01 RX ORDER — ONDANSETRON 2 MG/ML
4 INJECTION INTRAMUSCULAR; INTRAVENOUS
Status: COMPLETED | OUTPATIENT
Start: 2018-01-01 | End: 2018-01-01

## 2018-01-01 RX ORDER — HYDRALAZINE HYDROCHLORIDE 20 MG/ML
5 INJECTION INTRAMUSCULAR; INTRAVENOUS
Status: DISCONTINUED | OUTPATIENT
Start: 2018-01-01 | End: 2018-01-01 | Stop reason: HOSPADM

## 2018-01-01 RX ORDER — SODIUM CHLORIDE 9 MG/ML
INJECTION, SOLUTION INTRAVENOUS CONTINUOUS
Status: DISCONTINUED | OUTPATIENT
Start: 2018-01-01 | End: 2018-01-01 | Stop reason: HOSPADM

## 2018-01-01 RX ORDER — HALOPERIDOL 5 MG/ML
1 INJECTION INTRAMUSCULAR
Status: DISCONTINUED | OUTPATIENT
Start: 2018-01-01 | End: 2018-01-01 | Stop reason: HOSPADM

## 2018-01-01 RX ORDER — OXYCODONE HYDROCHLORIDE 5 MG/1
5 TABLET ORAL
Status: DISCONTINUED | OUTPATIENT
Start: 2018-01-01 | End: 2018-01-01 | Stop reason: HOSPADM

## 2018-01-01 RX ORDER — MEPERIDINE HYDROCHLORIDE 25 MG/ML
6.25 INJECTION INTRAMUSCULAR; INTRAVENOUS; SUBCUTANEOUS
Status: DISCONTINUED | OUTPATIENT
Start: 2018-01-01 | End: 2018-01-01 | Stop reason: HOSPADM

## 2018-01-01 RX ORDER — OXYCODONE HYDROCHLORIDE 5 MG/1
10 TABLET ORAL
Status: DISCONTINUED | OUTPATIENT
Start: 2018-01-01 | End: 2018-01-01 | Stop reason: HOSPADM

## 2018-01-01 RX ORDER — OXYCODONE HCL 5 MG/5 ML
5 SOLUTION, ORAL ORAL
Status: COMPLETED | OUTPATIENT
Start: 2018-01-01 | End: 2018-01-01

## 2018-01-01 RX ORDER — HYDROMORPHONE HYDROCHLORIDE 1 MG/ML
0.2 INJECTION, SOLUTION INTRAMUSCULAR; INTRAVENOUS; SUBCUTANEOUS
Status: DISCONTINUED | OUTPATIENT
Start: 2018-01-01 | End: 2018-01-01 | Stop reason: HOSPADM

## 2018-01-01 RX ORDER — HYDROMORPHONE HYDROCHLORIDE 1 MG/ML
0.4 INJECTION, SOLUTION INTRAMUSCULAR; INTRAVENOUS; SUBCUTANEOUS
Status: DISCONTINUED | OUTPATIENT
Start: 2018-01-01 | End: 2018-01-01 | Stop reason: HOSPADM

## 2018-01-01 RX ORDER — METOPROLOL TARTRATE 1 MG/ML
1 INJECTION, SOLUTION INTRAVENOUS
Status: DISCONTINUED | OUTPATIENT
Start: 2018-01-01 | End: 2018-01-01 | Stop reason: HOSPADM

## 2018-01-01 RX ADMIN — SODIUM CHLORIDE: 9 INJECTION, SOLUTION INTRAVENOUS at 10:31

## 2018-01-01 RX ADMIN — HYDRALAZINE HYDROCHLORIDE 5 MG: 20 INJECTION INTRAMUSCULAR; INTRAVENOUS at 12:41

## 2018-01-01 RX ADMIN — HYDRALAZINE HYDROCHLORIDE 5 MG: 20 INJECTION INTRAMUSCULAR; INTRAVENOUS at 13:36

## 2018-01-01 RX ADMIN — ONDANSETRON 4 MG: 2 INJECTION INTRAMUSCULAR; INTRAVENOUS at 12:24

## 2018-01-01 RX ADMIN — Medication 0.5 ML: at 10:25

## 2018-01-01 RX ADMIN — HYDRALAZINE HYDROCHLORIDE 5 MG: 20 INJECTION INTRAMUSCULAR; INTRAVENOUS at 13:08

## 2018-01-01 RX ADMIN — LIDOCAINE HYDROCHLORIDE 0.5 ML: 10 INJECTION, SOLUTION INFILTRATION; PERINEURAL at 10:25

## 2018-01-01 RX ADMIN — HALOPERIDOL LACTATE 1 MG: 5 INJECTION, SOLUTION INTRAMUSCULAR at 13:22

## 2018-01-01 RX ADMIN — Medication 5 MG: at 12:19

## 2018-01-01 RX ADMIN — FENTANYL CITRATE 50 MCG: 50 INJECTION, SOLUTION INTRAMUSCULAR; INTRAVENOUS at 12:24

## 2018-01-01 ASSESSMENT — PAIN SCALES - GENERAL
PAINLEVEL_OUTOF10: 7
PAINLEVEL_OUTOF10: 4
PAINLEVEL_OUTOF10: 5
PAINLEVEL_OUTOF10: 4
PAINLEVEL_OUTOF10: 0
PAINLEVEL_OUTOF10: 4

## 2018-02-15 ENCOUNTER — HOSPITAL ENCOUNTER (EMERGENCY)
Facility: MEDICAL CENTER | Age: 28
End: 2018-02-15
Attending: EMERGENCY MEDICINE
Payer: COMMERCIAL

## 2018-02-15 VITALS
WEIGHT: 140.65 LBS | BODY MASS INDEX: 21.32 KG/M2 | HEART RATE: 64 BPM | RESPIRATION RATE: 16 BRPM | DIASTOLIC BLOOD PRESSURE: 86 MMHG | OXYGEN SATURATION: 99 % | SYSTOLIC BLOOD PRESSURE: 141 MMHG | TEMPERATURE: 97.1 F | HEIGHT: 68 IN

## 2018-02-15 DIAGNOSIS — I77.0 A-V FISTULA (HCC): ICD-10-CM

## 2018-02-15 PROCEDURE — 99283 EMERGENCY DEPT VISIT LOW MDM: CPT

## 2018-02-15 ASSESSMENT — ENCOUNTER SYMPTOMS
FATIGUE: 0
ABDOMINAL PAIN: 0
BRUISES/BLEEDS EASILY: 0
FEVER: 0
ROS SKIN COMMENTS: PER HPI
DIZZINESS: 0

## 2018-02-15 ASSESSMENT — PAIN SCALES - GENERAL: PAINLEVEL_OUTOF10: 5

## 2018-02-15 NOTE — DISCHARGE INSTRUCTIONS
Please go get dialysis at 0600. IF you have any further problems, pain, fever, expanding fistula return to the ER immediatly

## 2018-02-15 NOTE — ED TRIAGE NOTES
"Otoniel Wing  27 y.o. male  Chief Complaint   Patient presents with   • Vascular Access Problem     Pt. states pain in his right AV fistula. Pt. states that the last time it was accessed was on tuesday. Pt' has bulging and skin tearing to the AV fistula site.     /86   Pulse 66   Temp 36.2 °C (97.1 °F)   Resp 16   Ht 1.727 m (5' 8\")   Wt 63.8 kg (140 lb 10.5 oz)   SpO2 100%   BMI 21.39 kg/m²     Pt amb to triage with steady gait for above complaint. Pt. States 5/10 pain to right AV fistula. Fistula is tight to the skin and red and swollen in areas. Charge RN notified.  Pt is alert and oriented, speaking in full sentences, follows commands and responds appropriately to questions. NAD. Resp are even and unlabored.  Pt placed in lobby. Pt educated on triage process. Pt encouraged to alert staff for any changes.  "

## 2018-02-15 NOTE — ED TRIAGE NOTES
Chief Complaint   Patient presents with   • Vascular Access Problem     Pt. states pain in his right AV fistula. Pt. states that the last time it was accessed was on tuesday. Pt' has bulging and skin tearing to the AV fistula site.     Pt states some small swelling of fistula along with new redness and pain. Pt states 5/10 pain in fistula.  Agree with triage rn

## 2018-02-15 NOTE — ED PROVIDER NOTES
"ED Provider Note    ED Provider Note          CHIEF COMPLAINT  Chief Complaint   Patient presents with   • Vascular Access Problem     Pt. states pain in his right AV fistula. Pt. states that the last time it was accessed was on tuesday. Pt' has bulging and skin tearing to the AV fistula site.       HPI  Otoniel Wing is a 27 y.o. male who presents to the Emergency Department concerns and his fistula. He has an AV fistula on the left arm who he is worried has had some changes overnight. He thinks there is some \"bulging or tearing\" of a certain area of it. He says that he has dialysis in 2 hours. He says it's just been changing a little bit. He denies any fevers or chills. He has not had any bleeding or trauma to his fistula.    REVIEW OF SYSTEMS  Review of Systems   Constitutional: Negative for fatigue and fever.   Gastrointestinal: Negative for abdominal pain.   Skin:        Per HPI   Neurological: Negative for dizziness.   Hematological: Does not bruise/bleed easily.       PAST MEDICAL HISTORY   has a past medical history of Anesthesia; Breath shortness; Changing skin lesion (8/17/2009); Chest pain (11/18/2014); CHF (congestive heart failure) (CMS-[facility]) (1/27/2014); Encounter for renal dialysis; History of anemia; Hypertension; Indigestion; Pain; Pneumonia (8/2015); Renal disorder (L arm fistula); and Renal failure.    SURGICAL HISTORY   has a past surgical history that includes av fistula creation (2005); av fistula revision (10/13/2009); cath placement (10/13/2009); appendectomy laparoscopic (11/10/2009); av fistula revision (10/28/2013); av fistula creation (12/22/2014); and parathyroid exploration (8/10/2016).    SOCIAL HISTORY  Social History   Substance Use Topics   • Smoking status: Former Smoker     Packs/day: 0.25     Years: 8.00     Quit date: 11/28/2016   • Smokeless tobacco: Never Used   • Alcohol use Yes      Comment: 3 drinks 2 x per month      History   Drug Use No       FAMILY " "HISTORY  Family History   Problem Relation Age of Onset   • Depression Mother        CURRENT MEDICATIONS  Reviewed.  See Encounter Summary.     ALLERGIES  Allergies   Allergen Reactions   • Bloodless    • Vancomycin Itching     Pt states itching.       PHYSICAL EXAM  VITAL SIGNS: /86   Pulse 66   Temp 36.2 °C (97.1 °F)   Resp 16   Ht 1.727 m (5' 8\")   Wt 63.8 kg (140 lb 10.5 oz)   SpO2 100%   BMI 21.39 kg/m²   Physical Exam   Constitutional: He is oriented to person, place, and time.   HENT:   Head: Normocephalic and atraumatic.   Eyes: Pupils are equal, round, and reactive to light.   Neck: Normal range of motion.   Cardiovascular: Normal rate.    Pulmonary/Chest: Effort normal.   Abdominal: Soft.   Neurological: He is alert and oriented to person, place, and time.   Skin: Skin is warm. He is not diaphoretic.   Inspection of the fistula shows no active bleeding or significant erythema, there is some skin changes then inflammatory related changes however patient says that this is chronic   Psychiatric: He has a normal mood and affect.             COURSE & MEDICAL DECISION MAKING  Pertinent Labs & Imaging studies reviewed. (See chart for details)    4:08 AM - Patient seen and examined at bedside.         Decision Making:  This is a 27 y.o. year old male who presents with concern of some changes to his fistula. He was saying he thought he had some skin changes of it however I cannot appreciate any significant erythema. There is no pulsatile . There is no bleeding. There is no signs of cellulitis or any sort of abscess. There is some overall skin changes however those are seen in other parts of the fistula that are not bothering him. I don't think it's infected. I was also concern for possible thrombosis however I cannot palpate any sort of cord and there is no significant swelling around it. He is going to get dialysis in 2 hours different this time the plan will be that he can go home and have the " dialysis nurse check his fistula in 2 hours. If there is any concerns between now and then such as increasing swelling, redness or enlargement of it or pain he is to return to the emergency department for evaluation     DISPOSITION:  Patient will be discharged home in stable condition.    FOLLOW UP:  [facility], Emergency Dept  [address] [id number] [phone]    If symptoms worsen      OUTPATIENT MEDICATIONS:  New Prescriptions    No medications on file         FINAL IMPRESSION  1. A-V fistula (CMS-[facility])

## 2018-02-15 NOTE — ED NOTES
Pt ambulatory  Vital signs stable  Pt handed d/c paperwork with understanding stated  Pt states will follow up with dialysis this morning  Pt states safe way home

## 2018-03-27 ENCOUNTER — HOSPITAL ENCOUNTER (EMERGENCY)
Facility: MEDICAL CENTER | Age: 28
End: 2018-03-27
Attending: EMERGENCY MEDICINE
Payer: COMMERCIAL

## 2018-03-27 ENCOUNTER — APPOINTMENT (OUTPATIENT)
Dept: RADIOLOGY | Facility: MEDICAL CENTER | Age: 28
End: 2018-03-27
Attending: EMERGENCY MEDICINE
Payer: COMMERCIAL

## 2018-03-27 VITALS
TEMPERATURE: 97.3 F | SYSTOLIC BLOOD PRESSURE: 175 MMHG | HEIGHT: 67 IN | HEART RATE: 66 BPM | DIASTOLIC BLOOD PRESSURE: 79 MMHG | BODY MASS INDEX: 22.76 KG/M2 | WEIGHT: 145 LBS | OXYGEN SATURATION: 100 % | RESPIRATION RATE: 16 BRPM

## 2018-03-27 DIAGNOSIS — R55 NEAR SYNCOPE: ICD-10-CM

## 2018-03-27 LAB
ALBUMIN SERPL BCP-MCNC: 3.8 G/DL (ref 3.2–4.9)
ALBUMIN/GLOB SERPL: 1 G/DL
ALP SERPL-CCNC: 53 U/L (ref 30–99)
ALT SERPL-CCNC: 17 U/L (ref 2–50)
ANION GAP SERPL CALC-SCNC: 13 MMOL/L (ref 0–11.9)
APTT PPP: 33.7 SEC (ref 24.7–36)
AST SERPL-CCNC: 15 U/L (ref 12–45)
BASOPHILS # BLD AUTO: 0.3 % (ref 0–1.8)
BASOPHILS # BLD: 0.01 K/UL (ref 0–0.12)
BILIRUB SERPL-MCNC: 0.5 MG/DL (ref 0.1–1.5)
BUN SERPL-MCNC: 55 MG/DL (ref 8–22)
CALCIUM SERPL-MCNC: 9.2 MG/DL (ref 8.5–10.5)
CHLORIDE SERPL-SCNC: 92 MMOL/L (ref 96–112)
CO2 SERPL-SCNC: 27 MMOL/L (ref 20–33)
CREAT SERPL-MCNC: 8.46 MG/DL (ref 0.5–1.4)
EKG IMPRESSION: NORMAL
EOSINOPHIL # BLD AUTO: 0.04 K/UL (ref 0–0.51)
EOSINOPHIL NFR BLD: 1 % (ref 0–6.9)
ERYTHROCYTE [DISTWIDTH] IN BLOOD BY AUTOMATED COUNT: 41.7 FL (ref 35.9–50)
GLOBULIN SER CALC-MCNC: 3.7 G/DL (ref 1.9–3.5)
GLUCOSE SERPL-MCNC: 81 MG/DL (ref 65–99)
HCT VFR BLD AUTO: 32.9 % (ref 42–52)
HGB BLD-MCNC: 11.1 G/DL (ref 14–18)
IMM GRANULOCYTES # BLD AUTO: 0.01 K/UL (ref 0–0.11)
IMM GRANULOCYTES NFR BLD AUTO: 0.3 % (ref 0–0.9)
INR PPP: 1.09 (ref 0.87–1.13)
LYMPHOCYTES # BLD AUTO: 0.98 K/UL (ref 1–4.8)
LYMPHOCYTES NFR BLD: 25.7 % (ref 22–41)
MCH RBC QN AUTO: 30.1 PG (ref 27–33)
MCHC RBC AUTO-ENTMCNC: 33.7 G/DL (ref 33.7–35.3)
MCV RBC AUTO: 89.2 FL (ref 81.4–97.8)
MONOCYTES # BLD AUTO: 0.26 K/UL (ref 0–0.85)
MONOCYTES NFR BLD AUTO: 6.8 % (ref 0–13.4)
NEUTROPHILS # BLD AUTO: 2.52 K/UL (ref 1.82–7.42)
NEUTROPHILS NFR BLD: 65.9 % (ref 44–72)
NRBC # BLD AUTO: 0 K/UL
NRBC BLD-RTO: 0 /100 WBC
PLATELET # BLD AUTO: 126 K/UL (ref 164–446)
PMV BLD AUTO: 11 FL (ref 9–12.9)
POTASSIUM SERPL-SCNC: 3.7 MMOL/L (ref 3.6–5.5)
PROT SERPL-MCNC: 7.5 G/DL (ref 6–8.2)
PROTHROMBIN TIME: 13.8 SEC (ref 12–14.6)
RBC # BLD AUTO: 3.69 M/UL (ref 4.7–6.1)
SODIUM SERPL-SCNC: 132 MMOL/L (ref 135–145)
WBC # BLD AUTO: 3.8 K/UL (ref 4.8–10.8)

## 2018-03-27 PROCEDURE — 80053 COMPREHEN METABOLIC PANEL: CPT

## 2018-03-27 PROCEDURE — 85730 THROMBOPLASTIN TIME PARTIAL: CPT

## 2018-03-27 PROCEDURE — 93005 ELECTROCARDIOGRAM TRACING: CPT | Performed by: EMERGENCY MEDICINE

## 2018-03-27 PROCEDURE — 99284 EMERGENCY DEPT VISIT MOD MDM: CPT

## 2018-03-27 PROCEDURE — 85610 PROTHROMBIN TIME: CPT

## 2018-03-27 PROCEDURE — 70450 CT HEAD/BRAIN W/O DYE: CPT

## 2018-03-27 PROCEDURE — 85025 COMPLETE CBC W/AUTO DIFF WBC: CPT

## 2018-03-27 PROCEDURE — 36415 COLL VENOUS BLD VENIPUNCTURE: CPT

## 2018-03-27 ASSESSMENT — PAIN SCALES - GENERAL
PAINLEVEL_OUTOF10: 0

## 2018-03-27 NOTE — ED TRIAGE NOTES
"Pt BIB EMS from dialysis. While 2 hours into dialysis pt became lightheaded and had \"glowing\" vision. Pt believes to much fluid was taken off. similar symptoms during last dialysis treatment that resolved after 2 hours.      "

## 2018-03-27 NOTE — DISCHARGE INSTRUCTIONS
Near-Syncope  Introduction  Near-syncope is when you suddenly become weak or dizzy, or you feel like you might pass out (faint). During an episode of near-syncope, you may:  · Feel dizzy or light-headed.  · Feel nauseous.  · See all white or all black in your field of vision.  · Have cold, clammy skin.  This condition is caused by a sudden decrease in blood flow to the brain. This decrease can result from various causes, but most of those causes are not dangerous. However, near-syncope can be a sign of a serious medical problem, so it is important to seek medical care.  If you fainted, get medical help right away.Call your local emergency services (911 in the [place] Do not drive yourself to the hospital.  Follow these instructions at home:  Pay attention to any changes in your symptoms. Take these actions to help with your condition:  · Have someone stay with you until you feel stable.  · Do not drive, use machinery, or play sports until your health care provider says it is okay.  · Keep all follow-up visits as told by your health care provider. This is important.  · If you start to feel like you might faint, lie down right away and raise (elevate) your feet above the level of your heart. Breathe deeply and steadily. Wait until all of the symptoms have passed.  · Drink enough fluid to keep your urine clear or pale yellow.  · If you are taking blood pressure or heart medicine, get up slowly and take several minutes to sit and then stand. This can reduce dizziness.  · Take over-the-counter and prescription medicines only as told by your health care provider.  Get help right away if:  · You have a severe headache.  · You have unusual pain in your chest, abdomen, or back.  · You are bleeding from your mouth or rectum, or you have black or tarry stool.  · You have a very fast or irregular heartbeat (palpitations).  · You faint once or repeatedly.  · You have a seizure.  · You are confused.  · You have trouble  walking.  · You have severe weakness.  · You have vision problems.  These symptoms may represent a serious problem that is an emergency. Do not wait to see if your symptoms will go away. Get medical help right away. Call your local emergency services (911 in the [place] Do not drive yourself to the hospital.   This information is not intended to replace advice given to you by your health care provider. Make sure you discuss any questions you have with your health care provider.  Document Released: 12/18/2006 Document Revised: 05/25/2017 Document Reviewed: 08/31/2016  © 2017 [employer]

## 2018-03-27 NOTE — ED PROVIDER NOTES
ED Provider Note    CHIEF COMPLAINT  Chief Complaint   Patient presents with   • Cloudy Vision   • Lightheadedness       HPI  Otoniel Wing is a 27 y.o. male with a history of end-stage renal disease on hemodialysis, congestive heart failure, hypertension, who presents with folliculitis, dizziness, blurry vision while at dialysis today. The patient says this happened once before after dialysis. He says it feels like he is about ready to pass out. He denies any headache, chest pain, back pain, or abdominal pain. He has had no fever, chills, sore throat, cough, vomiting, or diarrhea.    REVIEW OF SYSTEMS  See HPI for further details. All other systems are negative.     PAST MEDICAL HISTORY  Past Medical History:   Diagnosis Date   • Anesthesia     PONV-non recently   • Breath shortness     with chf exacerbation/fluid overload   • Changing skin lesion 8/17/2009   • Chest pain 11/18/2014   • CHF (congestive heart failure) (CMS-[facility]) 1/27/2014   • Encounter for renal dialysis     Tues, Thurs, Sat   • History of anemia     due to renal function   • Hypertension    • Indigestion    • Pain     bone pain due to parathyroid   • Pneumonia 8/2015   • Renal disorder L arm fistula   • Renal failure        FAMILY HISTORY  Family History   Problem Relation Age of Onset   • Depression Mother        SOCIAL HISTORY  Social History     Social History   • Marital status: Single     Spouse name: N/A   • Number of children: N/A   • Years of education: N/A     Social History Main Topics   • Smoking status: Former Smoker     Packs/day: 0.25     Years: 8.00     Quit date: 11/28/2016   • Smokeless tobacco: Never Used   • Alcohol use Yes      Comment: 3 drinks 2 x per month   • Drug use: No   • Sexual activity: No     Other Topics Concern   • Not on file     Social History Narrative   • No narrative on file       SURGICAL HISTORY  Past Surgical History:   Procedure Laterality Date   • PARATHYROID EXPLORATION  8/10/2016     "Procedure: PARATHYROID EXPLORATION with BILATERAL NIMS NERVE monitoring and cervical thymectomy ;  Surgeon: Topher Ramirez M.D.;  Location: SURGERY SAME DAY [facility];  Service:    • AV FISTULA CREATION  12/22/2014    Performed by Anselmo Felix M.D. at SURGERY [facility] ORS   • AV FISTULA REVISION  10/28/2013    Performed by Anselmo Felix M.D. at SURGERY [facility] ORS   • APPENDECTOMY LAPAROSCOPIC  11/10/2009    Performed by VLADISLAV PALACIO at SURGERY [facility] ORS   • AV FISTULA REVISION  10/13/2009    Performed by ANSELMO FELIX at SURGERY [facility] ORS   • CATH PLACEMENT  10/13/2009    Performed by ANSELMO FELIX at SURGERY [facility] ORS   • AV FISTULA CREATION  2005    left arm       CURRENT MEDICATIONS  Home Medications     Reviewed by Madisyn Joaquin R.N. (Registered Nurse) on 03/27/18 at 1055  Med List Status: Partial   Medication Last Dose Status   levetiracetam (KEPPRA) 500 MG Tab 3/27/2018 Active   lisinopril (PRINIVIL) 5 MG Tab 3/27/2018 Active                ALLERGIES  Allergies   Allergen Reactions   • Bloodless    • Vancomycin Itching     Pt states itching.       PHYSICAL EXAM  VITAL SIGNS: BP (!) 175/79   Pulse 61   Temp 36.3 °C (97.3 °F)   Resp 14   Ht 1.702 m (5' 7\")   Wt 65.8 kg (145 lb)   BMI 22.71 kg/m²   Constitutional: Awake, alert, in no acute distress, Non-toxic appearance.   HENT: Atraumatic. Bilateral external ears normal, mucous membranes moist, throat nonerythematous without exudates, nose is normal.  Eyes: PERRL, EOMI, conjunctiva moist, noninjected. No nystagmus or photophobia.  Neck: Nontender, Normal range of motion, No nuchal rigidity, No stridor.   Lymphatic: No lymphadenopathy noted.   Cardiovascular: Regular rate and rhythm, no murmurs, rubs, gallops.  Thorax & Lungs:  Good breath sounds bilaterally, no wheezes, rales, or retractions.  No chest tenderness.  Abdomen: Bowel sounds normal, Soft, nontender, nondistended, no rebound, guarding, " masses.  Back: No CVA or spinal tenderness.  Extremities: Intact distal pulses, No edema, No tenderness. There is a fistula today left upper extremity with a good pulse and thrill. No active bleeding. A dressing is present.  Skin: Warm, Dry, No rashes.   Musculoskeletal: No joint swelling or tenderness.  Neurologic: Alert & oriented x 3, cranial nerves II through XII intact, sensory and motor function normal. No focal deficits.   Psychiatric: Affect normal, Judgment normal, Mood normal.     EKG  12-lead EKG shows normal sinus rhythm, rate of 71, left internal hypertrophy, normal intervals, normal axis, no acute ST wave elevations, mild ST depressions in leads V5, V6, no pathologic Q waves, no evidence for an acute injury or ischemic pattern on my reading, in comparison to a previous EKG from July, 2017, the ST depressions were present previously, there are no acute changes.        RADIOLOGY/PROCEDURES  CT-HEAD W/O   Final Result      No acute intracranial findings.               COURSE & MEDICAL DECISION MAKING  Pertinent Labs & Imaging studies reviewed. (See chart for details)  The patient presents with the above complaints. He was on the hypertensive on arrival.  He is given oral fluids.  Orthostatic vital signs show no significant decrease in blood pressure or pulse from supine to sitting to standing.  CT scan without contrast was negative for any acute intracranial findings. CBC shows white count 3800, hemoglobin 11.1, normal differential, chemistry profile shows a BUN of 55, creatinine 8.46, otherwise unremarkable.  On recheck, the patient denied any further lightheadedness or dizziness. He has no signs of an infection, no fever, no elevated white blood cell count. CT scan head without contrast showed no acute findings. His anemia is chronic and actually his hemoglobin is improved from previous lab work.  The patient was able to ambulate without difficulty. He will be discharged with instructions to drink clear  fluids, return to the ER for any worsening symptoms, headache, dizziness, vomiting, fever, any other problems.    FINAL IMPRESSION  1. Near syncope  2.   3.         Electronically signed by: Ravi Laurent, 3/27/2018 11:20 AM

## 2018-03-28 ENCOUNTER — PATIENT OUTREACH (OUTPATIENT)
Dept: HEALTH INFORMATION MANAGEMENT | Facility: OTHER | Age: 28
End: 2018-03-28

## 2018-03-29 ENCOUNTER — OFFICE VISIT (OUTPATIENT)
Dept: NEUROLOGY | Facility: MEDICAL CENTER | Age: 28
End: 2018-03-29
Payer: COMMERCIAL

## 2018-03-29 VITALS
OXYGEN SATURATION: 98 % | TEMPERATURE: 97 F | SYSTOLIC BLOOD PRESSURE: 138 MMHG | BODY MASS INDEX: 22.29 KG/M2 | DIASTOLIC BLOOD PRESSURE: 82 MMHG | WEIGHT: 142 LBS | HEART RATE: 76 BPM | RESPIRATION RATE: 16 BRPM | HEIGHT: 67 IN

## 2018-03-29 DIAGNOSIS — Z13.31 SCREENING FOR DEPRESSION: ICD-10-CM

## 2018-03-29 DIAGNOSIS — G25.3 MYOCLONUS: ICD-10-CM

## 2018-03-29 DIAGNOSIS — R56.9 SEIZURES (HCC): ICD-10-CM

## 2018-03-29 PROCEDURE — 99214 OFFICE O/P EST MOD 30 MIN: CPT | Performed by: PSYCHIATRY & NEUROLOGY

## 2018-03-29 RX ORDER — LEVETIRACETAM 500 MG/1
TABLET ORAL
Qty: 90 TAB | Refills: 11 | Status: SHIPPED | OUTPATIENT
Start: 2018-03-29

## 2018-03-29 ASSESSMENT — PATIENT HEALTH QUESTIONNAIRE - PHQ9: CLINICAL INTERPRETATION OF PHQ2 SCORE: 0

## 2018-03-29 NOTE — PROGRESS NOTES
Chief Complaint   Patient presents with   • Follow-Up     Localization-related epilepsy        Problem List Items Addressed This Visit     None      Visit Diagnoses     Myoclonus        Relevant Medications    levETIRAcetam (KEPPRA) 500 MG Tab    Other Relevant Orders    REFERRAL TO NEURODIAGNOSTICS (EEG,EP,EMG/NCS/DBS) Modality Requested: Ambulatory EEG (24 hrs amb eeg)    Seizures (CMS-HCC)        Relevant Medications    levETIRAcetam (KEPPRA) 500 MG Tab    Other Relevant Orders    REFERRAL TO NEURODIAGNOSTICS (EEG,EP,EMG/NCS/DBS) Modality Requested: Ambulatory EEG (24 hrs amb eeg)    Screening for depression              Interim history:  Otoniel Wing returns in follow up. He has been doing well, denies any further spells. States Keppra is working, takes 500 mg bid and an additional 500 mg post HD days.     Not depressed or suicidal.     Does not drive.     States he is compliant and denies any side effects from medication.         Past medical history:   Past Medical History:   Diagnosis Date   • Anesthesia     PONV-non recently   • Breath shortness     with chf exacerbation/fluid overload   • Changing skin lesion 8/17/2009   • Chest pain 11/18/2014   • CHF (congestive heart failure) (CMS-HCC) 1/27/2014   • Encounter for renal dialysis     Tues, Thurs, Sat   • History of anemia     due to renal function   • Hypertension    • Indigestion    • Pain     bone pain due to parathyroid   • Pneumonia 8/2015   • Renal disorder L arm fistula   • Renal failure        Past surgical history:   Past Surgical History:   Procedure Laterality Date   • PARATHYROID EXPLORATION  8/10/2016    Procedure: PARATHYROID EXPLORATION with BILATERAL NIMS NERVE monitoring and cervical thymectomy ;  Surgeon: Topher Ramirez M.D.;  Location: SURGERY SAME DAY [facility];  Service:    • AV FISTULA CREATION  12/22/2014    Performed by Chris Felix M.D. at SURGERY [facility]   • AV FISTULA REVISION  10/28/2013     Performed by Anselmo Felix M.D. at SURGERY [facility] ORS   • APPENDECTOMY LAPAROSCOPIC  11/10/2009    Performed by VLADISLAV PALACIO at SURGERY [facility] ORS   • AV FISTULA REVISION  10/13/2009    Performed by ANSELMO FELIX at SURGERY [facility] ORS   • CATH PLACEMENT  10/13/2009    Performed by ANSELMO FELIX at SURGERY [facility] ORS   • AV FISTULA CREATION  2005    left arm       Family history:   Family History   Problem Relation Age of Onset   • Depression Mother        Social history:   Social History     Social History   • Marital status: Single     Spouse name: N/A   • Number of children: N/A   • Years of education: N/A     Occupational History   • Not on file.     Social History Main Topics   • Smoking status: Former Smoker     Packs/day: 0.25     Years: 8.00     Quit date: 11/28/2016   • Smokeless tobacco: Never Used   • Alcohol use Yes      Comment: 3 drinks 2 x per month   • Drug use: No   • Sexual activity: No     Other Topics Concern   • Not on file     Social History Narrative   • No narrative on file       Current medications:   Current Outpatient Prescriptions   Medication   • levETIRAcetam (KEPPRA) 500 MG Tab   • lisinopril (PRINIVIL) 5 MG Tab     No current facility-administered medications for this visit.        Medication Allergy:  Allergies   Allergen Reactions   • Bloodless    • Vancomycin Itching     Pt states itching.       Review of systems:   Constitutional:   denies fever, night sweats, weight loss.    Eyes:   denies acute vision change, eye pain or secretion.    Ears, Nose, Mouth, and Throat:   denies nasal secretion, nasal bleeding, difficulty swallowing, hearing loss, tinnitus, vertigo, ear pain, oral ulcers or lesions.    Endocrine:   denies recent weight changes, heat or cold intolerance, polyuria, polydipsia, polyphagia, abnormal hair growth.  Cardiovascular:   denies new onset of chest pain, recent palpitations, lower extremity edema, or dyspnea of  "exertion.  Pulmonary:   denies shortness of breath, new onset of cough, hemoptysis, wheezing, chest pain or flu-like symptoms.    GI:   denies nausea, vomiting, diarrhea, GI bleeding, change in appetite, abdominal pain, and change in bowel habits.  :   denies urinary incontinence, retention or hematuria.  Heme/oncology:   denies history of easy bruising or bleeding. No history of cancer.    Allergy/immunology  : denies hives/urticarial.  Dermatologic:   denies new rash.  Musculoskeletal:   denies joint swelling or pain, muscle pain, neck and back pain.    Neurologic:   denies headaches, acute visual changes, facial droopiness, muscle weakness (focal or generalized), paresthesias, anesthesia, ataxia, change in speech or language, seizures, memory loss.  Psychiatric:   denies symptoms of depression, anxiety, hallucinations, mood swings or changes, suicidal     Physical examination:   Vitals:    03/29/18 0929   BP: 138/82   Pulse: 76   Resp: 16   Temp: 36.1 °C (97 °F)   SpO2: 98%   Weight: 64.4 kg (142 lb)   Height: 1.702 m (5' 7\")     General:   Patient in no acute distress, pleasant and cooperative.  HEENT:   Normocephalic, no signs of acute trauma.    Neck:   supple, no meningeal signs or carotid bruits. Scar present on R lateral cheek and neck. There is normal range of motion. No tenderness on exam.    Chest:   clear to auscultation. No cough.    CV:   RRR, no murmurs.    Skin:   no signs of acute rashes or trauma.    Musculoskeletal:   joints exhibit full range of motion, without any pain to palpation. There are no signs of joint or muscle swelling. There is no tenderness to deep palpation of muscles.    Psychiatric:   No hallucinatory behavior. Denies symptoms of mood change or suicidal ideation. Mood and affect slightly depressed.  Neurological exam:       Mental status, orientation:   Awake, alert and fully oriented.    Speech and language:   speech is clear and fluent. The patient is able to name, repeat and " comprehend.    Memory:   There is intact recollection of recent and remote events.    Cranial nerve exam:   Pupils are 3-4 mm bilaterally and equally reactive to light and accommodation. Visual fields are intact by confrontation. There is no nystagmus on primary or secondary gaze. Intact full EOM in all directions of gaze. Face appears symmetric. Sensation in the face is intact to light touch. Uvula is midline. Palate elevates symmetrically. Tongue is midline and without any signs of tongue biting or fasciculations. Sternocleidomastoid muscles exhibit is normal strength bilaterally. Shoulder shrug is intact bilaterally.    Motor exam:   Strength is 5/5 in all extremities. Tone is normal. No abnormal movements were seen on exam.    Sensory exam:   reveals normal sense of light touch, pain, vibration and proprioception in all extremities.    Deep tendon reflexes:    2+ throughout. Plantar responses are flexor. There is no clonus.    Coordination:   shows a normal finger-nose-finger. Normal rapidly alternating movements.    Gait:   The patient was able to get up from seated position on first attempt without requiring assistance. Found to be steady when walking. Movements were fluid with normal arm swing. The patient was able to turn without difficulties or tendency to fall. Romberg exam was unremarkable.            ANCILLARY DATA REVIEWED:     Lab Data Review:  Reviewed in chart.     Records reviewed:   Chart reviewed.        Imaging:   MRI brain wo, 7/29/17:  1. MRI OF THE BRAIN WITHOUT CONTRAST WITHIN NORMAL LIMITS.  2. NO EVIDENCE OF MASS LESION, HETEROTOPIC GRAY MATTER, GROSS CORTICAL DYSPLASIA, OR MESIAL TEMPORAL SCLEROSIS.        EEG, 7/31/17:  Non-specific cortical dysfunction more over right? ( prolonged theta bursts)  The clinical significance of this EEG abnormality remains uncertain  Follow up in neurology clinic and or prolonged EEG in the future may be of help           ASSESSMENT AND PLAN:    1.  Myoclonus  - levETIRAcetam (KEPPRA) 500 MG Tab; 1 tab po bid, and also on HD day, take an extra tab right after dialysis.  Dispense: 90 Tab; Refill: 11  - REFERRAL TO NEURODIAGNOSTICS (EEG,EP,EMG/NCS/DBS) Modality Requested: Ambulatory EEG (24 hrs amb eeg)    2. Seizures (CMS-HCC)  - REFERRAL TO NEURODIAGNOSTICS (EEG,EP,EMG/NCS/DBS) Modality Requested: Ambulatory EEG (24 hrs amb eeg)    3. Screening for depression          CLINICAL DISCUSSION:   Uncertain about nature of spells. For what he describes, some myoclonus and he is confused after but aware of the spells. No h/o GTC seizures. Possible abnormal EEG. Myoclonus can be metabolic, particularly during dialysis. I have no objection he continues on Keppra, as he reports improvement with it. Keppra has been renally adjusted to 500 mg q 12 hrs with additional doses right after HD. He agrees to undergo 24 hrs EEG amb, which will be done during one of his HD days to attempt to capture spells.   He does not drive.         FOLLOW-UP:   Return in about 3-6 months, with amb eeg.         EDUCATION AND COUNSELING:  -Education was provided to the patient and/or family regarding diagnosis and prognosis. The chronic and unpredictable nature of the condition were discussed. There is increased risk for additional events, which may carry potential for significant injuries and death. Discussed frequent seizure triggers: sleep deprivation, medication non-compliance, use of illegal drugs/alcohol, stress, and others.   -We reviewed in detail the current antiepileptic regimen. Potential side effects of antiepileptics were discussed at length, including but no limited to: hypersensitivity reactions (rash and others, some of which can be fatal), visual field changes (some of which may be irreversible), glaucoma, diplopia, kidney stones, osteopenia/osteoporosis/bone fractures, hyperthermia/anhydrosis, hyponatremia, tremors/abnormal movements, ataxia, dizziness, fatigue, increased risk  for falls, risk for cardiac arrhythmias/syncope, gastrointestinal side effects(hepatitis, pancreatitis, gastritis, ulcers), gingival hypertrophy/bleeding, drowsiness, sedation, anxiety/nervousness, increased risk for suicide, increased risk for depression, and psychosis.   -We also reviewed drug-drug interactions and their potential effect on seizure control and medication side effects.    -Recommend chronic vitamin D supplementation and regular exercise (if not contraindicated).   -Patient/family educated on risk for SUDEP (Sudden Death in Epilepsy). Counseling was provided on the importance of strict medication and follow up compliance. The patient/family understand the risks associated with non-adherence with the medical plan as outlined, including but not limited to an increased risk for breakthrough seizures, which may contribute to injuries, disability, status epilepticus, and even death.   -Counseling was also provided on potential effects of alcohol and other drugs, which may lower seizure threshold and/or affect the metabolism of antiepileptic drugs. We recommend avoidance of alcohol and illegal drugs.  -Avoid sleep deprivation.   -We extensively discussed the aspects related to safety in drivers who suffer from epilepsy. The patient is encourage to report to the Division of Motor Vehicles of any condition and/or spells related to confusion, disorientation, and/or loss of awareness and/or loss of consciousness; as these may pose a safety issue if they occur while operating a motor vehicle. The patient and/or family are ultimately responsible for exercising caution and abiding to regulations in place. He does not drive.   -Other seizure precautions were discussed at length, including no diving, no skydiving, no climbing or exposure to unprotected heights, no unsupervised swimming, no Jacuzzi or bathing in bathtubs or deep bodies of water. The patient/family have been advised about risks for operating any  machinery while suffering from seizures / syncope / epilepsy and/or while taking antiepileptic drugs.      Patient agrees with plan, as outlined.      The patient understands and agrees that due to the complexity of his/her diagnosis, results of any testing and further recommendations will typically be discussed/made during a face to face encounter in my office. The patient and/or family further understands it is their responsibility to keep proper follow up.       Roscoe Keys MD   Epilepsy and Neurodiagnostics.   Clinical  of Neurology [facility].   Diplomate in Neurology, Epilepsy, and Electrodiagnostic Medicine.   Office: [phone]  Fax: [phone]      o BILLING DOCUMENTATION:   o Counseling:  I spent greater than 50% time face-to-face time of a total of 35 mins visit. Over 50% of the time of the visit today was spent on counseling and or coordination of care wtih the patient/family, with greater than 50% of the total time discussing:   o Diagnostic results, impressions, and/or recommended diagnostic studies, and coordination of care.   o Prognosis.  o Treatment recommendations, including risks, benefits, & alternatives.  o Instructions for treatment/management and/or follow-up.  o Importance of compliance with chosen treatment/management options.  o Risk factor modification.   o Patient & family education.  o Provided business card and/or instructions for follow-up & emergencies.

## 2018-04-16 ENCOUNTER — HOSPITAL ENCOUNTER (OUTPATIENT)
Dept: HOSPITAL 8 - OUT | Age: 28
End: 2018-04-16
Attending: SURGERY
Payer: COMMERCIAL

## 2018-04-16 VITALS — SYSTOLIC BLOOD PRESSURE: 165 MMHG | DIASTOLIC BLOOD PRESSURE: 92 MMHG

## 2018-04-16 VITALS — HEIGHT: 67 IN | WEIGHT: 144.4 LBS | BODY MASS INDEX: 22.66 KG/M2

## 2018-04-16 DIAGNOSIS — I50.9: ICD-10-CM

## 2018-04-16 DIAGNOSIS — N18.6: ICD-10-CM

## 2018-04-16 DIAGNOSIS — I12.0: Primary | ICD-10-CM

## 2018-04-16 PROCEDURE — 36821 AV FUSION DIRECT ANY SITE: CPT

## 2018-04-16 PROCEDURE — 36415 COLL VENOUS BLD VENIPUNCTURE: CPT

## 2018-05-05 DIAGNOSIS — I10 ESSENTIAL HYPERTENSION: ICD-10-CM

## 2018-05-07 RX ORDER — LISINOPRIL 5 MG/1
TABLET ORAL
Qty: 90 TAB | Refills: 0 | Status: SHIPPED | OUTPATIENT
Start: 2018-05-07 | End: 2018-07-18 | Stop reason: SDUPTHER

## 2018-07-09 ENCOUNTER — APPOINTMENT (OUTPATIENT)
Dept: NEUROLOGY | Facility: MEDICAL CENTER | Age: 28
End: 2018-07-09
Payer: COMMERCIAL

## 2018-07-10 ENCOUNTER — APPOINTMENT (OUTPATIENT)
Dept: NEUROLOGY | Facility: MEDICAL CENTER | Age: 28
End: 2018-07-10
Payer: COMMERCIAL

## 2018-07-18 ENCOUNTER — OFFICE VISIT (OUTPATIENT)
Dept: CARDIOLOGY | Facility: MEDICAL CENTER | Age: 28
End: 2018-07-18
Payer: COMMERCIAL

## 2018-07-18 VITALS
HEART RATE: 74 BPM | BODY MASS INDEX: 22.13 KG/M2 | SYSTOLIC BLOOD PRESSURE: 164 MMHG | DIASTOLIC BLOOD PRESSURE: 102 MMHG | WEIGHT: 141 LBS | OXYGEN SATURATION: 98 % | HEIGHT: 67 IN

## 2018-07-18 DIAGNOSIS — N18.6 END STAGE RENAL DISEASE (HCC): ICD-10-CM

## 2018-07-18 DIAGNOSIS — I50.20 ACC/AHA STAGE C SYSTOLIC HEART FAILURE (HCC): ICD-10-CM

## 2018-07-18 DIAGNOSIS — Z99.2 DIALYSIS PATIENT (HCC): ICD-10-CM

## 2018-07-18 DIAGNOSIS — I50.9 HEART FAILURE, NYHA CLASS 2 (HCC): ICD-10-CM

## 2018-07-18 DIAGNOSIS — R06.09 DYSPNEA ON EXERTION: ICD-10-CM

## 2018-07-18 DIAGNOSIS — I10 ESSENTIAL HYPERTENSION: ICD-10-CM

## 2018-07-18 PROCEDURE — 99214 OFFICE O/P EST MOD 30 MIN: CPT | Performed by: NURSE PRACTITIONER

## 2018-07-18 RX ORDER — LISINOPRIL 5 MG/1
TABLET ORAL
Qty: 90 TAB | Refills: 3 | Status: SHIPPED | OUTPATIENT
Start: 2018-07-18 | End: 2019-01-01 | Stop reason: DRUGHIGH

## 2018-07-18 RX ORDER — ISOTRETINOIN 20 MG/1
40 CAPSULE ORAL SEE ADMIN INSTRUCTIONS
COMMUNITY
End: 2019-01-01

## 2018-07-18 RX ORDER — CARVEDILOL 6.25 MG/1
6.25 TABLET ORAL 2 TIMES DAILY WITH MEALS
Qty: 180 TAB | Refills: 3 | Status: SHIPPED | OUTPATIENT
Start: 2018-07-18 | End: 2019-01-01 | Stop reason: DRUGHIGH

## 2018-07-18 ASSESSMENT — MINNESOTA LIVING WITH HEART FAILURE QUESTIONNAIRE (MLHF)
SWELLING IN ANKLES OR LEGS: 0
HAVING TO SIT OR LIE DOWN DURING THE DAY: 2
FEELING LIKE A BURDEN TO FAMILY AND FRIENDS: 0
TOTAL_SCORE: 18
MAKING YOU SHORT OF BREATH: 2
LOSS OF SELF CONTROL IN YOUR LIFE: 0
MAKING YOU WORRY: 0
DIFFICULTY TO CONCENTRATE OR REMEMBERING THINGS: 0
DIFFICULTY SOCIALIZING WITH FAMILY OR FRIENDS: 0
MAKING YOU STAY IN A HOSPITAL: 0
TIRED, FATIGUED OR LOW ON ENERGY: 3
DIFFICULTY WITH SEXUAL ACTIVITIES: 0
GIVING YOU SIDE EFFECTS FROM TREATMENTS: 0
DIFFICULTY WITH RECREATIONAL PASTIMES, SPORTS, HOBBIES: 0
DIFFICULTY SLEEPING WELL AT NIGHT: 1
WORKING AROUND THE HOUSE OR YARD DIFFICULT: 1
MAKING YOU FEEL DEPRESSED: 0
EATING LESS FOODS YOU LIKE: 3
COSTING YOU MONEY FOR MEDICAL CARE: 3
DIFFICULTY GOING AWAY FROM HOME: 0
DIFFICULTY WORKING TO EARN A LIVING: 3
WALKING ABOUT OR CLIMBING STAIRS DIFFICULT: 0

## 2018-07-18 ASSESSMENT — 6 MINUTE WALK TEST (6MWT): TOTAL DISTANCE WALKED (METERS): 0

## 2018-07-18 ASSESSMENT — ENCOUNTER SYMPTOMS
COUGH: 0
PALPITATIONS: 0
MYALGIAS: 0
PND: 0
CLAUDICATION: 0
ORTHOPNEA: 0
ABDOMINAL PAIN: 0
DIZZINESS: 0
FEVER: 0
SHORTNESS OF BREATH: 0

## 2018-07-18 NOTE — PROGRESS NOTES
Chief Complaint   Patient presents with   • Follow-Up       Subjective:   Otoniel Wing is a 28 y.o. male who presents today for follow-up on his heart failure.    Patient Dr. Godinez.  Patient was last seen in clinic on 6/5/2017.  Patient was sent for repeat echo since his last visit.    For his symptoms, patient has been feeling well. He denies chest pain, shortness of breath, palpitations, dizziness/lightheadedness, orthopnea, PND or Edema.     Patient did have a new fistula placement in approximately May of this last year on his right forearm.    Patient continues to receive dialysis 3 times a week.    Patient reports he ran out of his carvedilol 2 days ago.    Additonally, patient has the following medical problems:    -ESRD-Dialysis T/Th/Sat    -HTN  Past Medical History:   Diagnosis Date   • Anesthesia     PONV-non recently   • Breath shortness     with chf exacerbation/fluid overload   • Changing skin lesion 8/17/2009   • Chest pain 11/18/2014   • CHF (congestive heart failure) ([facility]) 1/27/2014   • Encounter for renal dialysis     Tues, Thurs, Sat   • History of anemia     due to renal function   • Hypertension    • Indigestion    • Pain     bone pain due to parathyroid   • Pneumonia 8/2015   • Renal disorder L arm fistula   • Renal failure      Past Surgical History:   Procedure Laterality Date   • AV FISTULA CREATION Right 05/2018   • PARATHYROID EXPLORATION  8/10/2016    Procedure: PARATHYROID EXPLORATION with BILATERAL NIMS NERVE monitoring and cervical thymectomy ;  Surgeon: Topher Ramirez M.D.;  Location: SURGERY SAME DAY [facility];  Service:    • AV FISTULA CREATION  12/22/2014    Performed by Chris Felix M.D. at SURGERY [facility]   • AV FISTULA REVISION  10/28/2013    Performed by Chris Felix M.D. at SURGERY [facility]   • APPENDECTOMY LAPAROSCOPIC  11/10/2009    Performed by VLADISLAV PALACIO at SURGERY [facility]   • AV FISTULA REVISION  10/13/2009    Performed  by ANSELMO DEE at SURGERY [facility]   • CATH PLACEMENT  10/13/2009    Performed by ANSELMO DEE at SURGERY [facility]   • AV FISTULA CREATION  2005    left arm     Family History   Problem Relation Age of Onset   • Depression Mother    • No Known Problems Sister    • No Known Problems Sister    • No Known Problems Sister      Social History     Social History   • Marital status: Single     Spouse name: N/A   • Number of children: N/A   • Years of education: N/A     Occupational History   • Not on file.     Social History Main Topics   • Smoking status: Former Smoker     Packs/day: 0.25     Years: 8.00     Quit date: 11/28/2016   • Smokeless tobacco: Never Used   • Alcohol use Yes      Comment: 1x per month   • Drug use: No   • Sexual activity: No     Other Topics Concern   • Not on file     Social History Narrative   • No narrative on file     Allergies   Allergen Reactions   • Bloodless    • Vancomycin Itching     Pt states itching.     Outpatient Encounter Prescriptions as of 7/18/2018   Medication Sig Dispense Refill   • calcium acetate (PHOS-LO) 667 MG Cap Take 1,334 mg by mouth 3 times a day, with meals. Taking 6 capsules per day.     • isotretinoin (MYORISAN) 20 MG capsule Take 20 mg by mouth every day.     • carvedilol (COREG) 6.25 MG Tab Take 1 Tab by mouth 2 times a day, with meals. 180 Tab 3   • lisinopril (PRINIVIL) 5 MG Tab TAKE ONE TABLET BY MOUTH ONCE DAILY 90 Tab 3   • levETIRAcetam (KEPPRA) 500 MG Tab 1 tab po bid, and also on HD day, take an extra tab right after dialysis. 90 Tab 11   • [DISCONTINUED] lisinopril (PRINIVIL) 5 MG Tab TAKE ONE TABLET BY MOUTH ONCE DAILY 90 Tab 0   • [DISCONTINUED] carvedilol (COREG) 6.25 MG Tab TAKE ONE TABLET BY MOUTH TWICE DAILY WITH MEALS 90 Tab 0     No facility-administered encounter medications on file as of 7/18/2018.      Review of Systems   Constitutional: Negative for fever and malaise/fatigue.   Respiratory: Negative for cough and shortness  "of breath.    Cardiovascular: Negative for chest pain, palpitations, orthopnea, claudication, leg swelling and PND.   Gastrointestinal: Negative for abdominal pain.   Musculoskeletal: Negative for myalgias.   Neurological: Negative for dizziness.   All other systems reviewed and are negative.       Objective:   BP (!) 164/102   Pulse 74   Ht 1.702 m (5' 7\")   Wt 64 kg (141 lb)   SpO2 98%   BMI 22.08 kg/m²     Physical Exam   Constitutional: He is oriented to person, place, and time. He appears well-developed and well-nourished.   HENT:   Head: Normocephalic and atraumatic.   Eyes: EOM are normal. Pupils are equal, round, and reactive to light.   Neck: Normal range of motion. Neck supple. No JVD present.   Cardiovascular: Normal rate, regular rhythm and normal heart sounds.    Pulmonary/Chest: Effort normal and breath sounds normal. No respiratory distress. He has no wheezes. He has no rales.   Abdominal: Soft. Bowel sounds are normal.   Musculoskeletal: He exhibits no edema.   Neurological: He is alert and oriented to person, place, and time.   Skin: Skin is warm and dry.   Left upper arm Fistula-no bruit or thrill  Right Forearm AV Fistula with bruit and thrill   Psychiatric: He has a normal mood and affect. His behavior is normal.   Vitals reviewed.    Lab Results   Component Value Date/Time    CHOLSTRLTOT 156 09/05/2013 02:42 PM     (H) 09/05/2013 02:42 PM    HDL 34 (A) 09/05/2013 02:42 PM    TRIGLYCERIDE 48 09/05/2013 02:42 PM       Lab Results   Component Value Date/Time    SODIUM 132 (L) 03/27/2018 11:38 AM    POTASSIUM 3.7 03/27/2018 11:38 AM    CHLORIDE 92 (L) 03/27/2018 11:38 AM    CO2 27 03/27/2018 11:38 AM    GLUCOSE 81 03/27/2018 11:38 AM    BUN 55 (H) 03/27/2018 11:38 AM    CREATININE 8.46 (HH) 03/27/2018 11:38 AM    CREATININE 7.5 (HH) 06/11/2007 11:50 PM     Lab Results   Component Value Date/Time    ALKPHOSPHAT 53 03/27/2018 11:38 AM    ASTSGOT 15 03/27/2018 11:38 AM    ALTSGPT 17 " 03/27/2018 11:38 AM    TBILIRUBIN 0.5 03/27/2018 11:38 AM      Transthoracic Echo Report 3/2/17  Severely reduced left ventricular systolic function.  Normal left ventricular wall thickness.  Left ventricle is mildly dilated.  Aortic sclerosis without stenosis.  Moderate eccentric tricuspid regurgitation.  Moderate eccentric tricuspid regurgitation.  Right ventricular systolic pressure is estimated to be 35 mmHg.  Compared to the images of the study done 11/19/14 - there has been   worsening of left ventricular ejection fraction.     Transthoracic Echo Report 7/5/2017-results reviewed with patient  Limited study.  Left ventricular ejection fraction is visually estimated to be 40%.    Assessment:     1. ACC/AHA stage C systolic heart failure (HCC)  carvedilol (COREG) 6.25 MG Tab   2. Heart failure, NYHA class 2 (HCC)  carvedilol (COREG) 6.25 MG Tab   3. End stage renal disease (HCC)  carvedilol (COREG) 6.25 MG Tab   4. Dialysis patient (HCC)  carvedilol (COREG) 6.25 MG Tab   5. Dyspnea on exertion  carvedilol (COREG) 6.25 MG Tab   6. Essential hypertension  lisinopril (PRINIVIL) 5 MG Tab       Medical Decision Making:  Today's Assessment / Status / Plan:   1. HFrEF, Stage C, Class 1, LVEF 40%: Based on physical examination findings, patient is euvolemic. No JVD, lungs are clear to auscultation, no pitting edema in bilateral lower extremities, no ascites.  -Continue/restart carvedilol 6.25 mg twice a day  -Continue lisinopril 5 mg daily  -No Spironolactone due to end-stage renal disease  -Reinforced s/sx of worsening heart failure with patient and weight monitoring. Pt verbalizes understanding. Pt to call office or RTC if present.     2. HTN: BP elevated in clinic today, likely due to being out of Carvedilol, carvedilol refilled  -Recommendations per above    3.  End-stage renal disease:  -Continue dialysis    FU in clinic in 6 weeks with Dr. Godinez. Sooner if needed.    Patient verbalizes understanding and agrees with  the plan of care.     Collaborating MD: Nitin Hendrickson MD

## 2018-08-09 ENCOUNTER — APPOINTMENT (OUTPATIENT)
Dept: NEUROLOGY | Facility: MEDICAL CENTER | Age: 28
End: 2018-08-09
Payer: COMMERCIAL

## 2018-08-29 ENCOUNTER — OFFICE VISIT (OUTPATIENT)
Dept: CARDIOLOGY | Facility: MEDICAL CENTER | Age: 28
End: 2018-08-29
Payer: COMMERCIAL

## 2018-08-29 VITALS
DIASTOLIC BLOOD PRESSURE: 84 MMHG | WEIGHT: 140 LBS | HEART RATE: 90 BPM | BODY MASS INDEX: 21.97 KG/M2 | HEIGHT: 67 IN | SYSTOLIC BLOOD PRESSURE: 140 MMHG | OXYGEN SATURATION: 99 %

## 2018-08-29 DIAGNOSIS — I50.20 ACC/AHA STAGE C SYSTOLIC HEART FAILURE (HCC): ICD-10-CM

## 2018-08-29 DIAGNOSIS — I10 HTN (HYPERTENSION), MALIGNANT: ICD-10-CM

## 2018-08-29 DIAGNOSIS — R06.09 DYSPNEA ON EXERTION: ICD-10-CM

## 2018-08-29 DIAGNOSIS — I50.9 HEART FAILURE, NYHA CLASS 2 (HCC): ICD-10-CM

## 2018-08-29 DIAGNOSIS — Z99.2 DIALYSIS PATIENT (HCC): ICD-10-CM

## 2018-08-29 DIAGNOSIS — N18.6 END STAGE RENAL DISEASE (HCC): ICD-10-CM

## 2018-08-29 PROCEDURE — 99215 OFFICE O/P EST HI 40 MIN: CPT | Performed by: INTERNAL MEDICINE

## 2018-08-29 ASSESSMENT — ENCOUNTER SYMPTOMS
COUGH: 0
FALLS: 0
CHILLS: 0
BLOOD IN STOOL: 0
DEPRESSION: 0
PND: 0
SPEECH CHANGE: 0
SHORTNESS OF BREATH: 1
LOSS OF CONSCIOUSNESS: 0
EYE DISCHARGE: 0
BRUISES/BLEEDS EASILY: 0
EYE PAIN: 0
CLAUDICATION: 0
PALPITATIONS: 0
DIZZINESS: 0
MYALGIAS: 0
ABDOMINAL PAIN: 0
SENSORY CHANGE: 0
BLURRED VISION: 0
HEADACHES: 0
DOUBLE VISION: 0
NAUSEA: 0
ORTHOPNEA: 0
FEVER: 0
HALLUCINATIONS: 0
WEIGHT LOSS: 0
VOMITING: 0

## 2018-08-29 NOTE — PROGRESS NOTES
Chief Complaint   Patient presents with   • CHF (Chronic)       Subjective:   Otoniel Wing is a 28 y.o. male who presents today for cardiac care and management in our heart failure clinic for heart failure. Patient also has a history of end-stage renal disease for which he is on hemodialysis. He cannot be placed on transplant list because of social issues. He was found to be having possible viral upper respiratory tract infection. During his hospital stay 1.5 years ago, patient was also found to have depressed left ventricular systolic function documented to be at 30%.      Patient was able to complete 438 m during his 6 minute walk test. his O2 saturation at baseline was 100% and at the end of the test, the O2 saturation was 99%. he reported 1 level of dyspnea on Iker scale.     Patient is feeling better these days. Does get winded upon walking up inclines or for distance. No symptoms at rest or with daily living activities.     Was not able to tolerate higher doses of Coreg and Lisinopril due to light headedness and not feeling energetic.     Getting HD 3 times a week.    Past Medical History:   Diagnosis Date   • Anesthesia     PONV-non recently   • Breath shortness     with chf exacerbation/fluid overload   • Changing skin lesion 8/17/2009   • Chest pain 11/18/2014   • CHF (congestive heart failure) (HCC) 1/27/2014   • Encounter for renal dialysis     Tues, Thurs, Sat   • History of anemia     due to renal function   • Hypertension    • Indigestion    • Pain     bone pain due to parathyroid   • Pneumonia 8/2015   • Renal disorder L arm fistula   • Renal failure      Past Surgical History:   Procedure Laterality Date   • AV FISTULA CREATION Right 05/2018   • PARATHYROID EXPLORATION  8/10/2016    Procedure: PARATHYROID EXPLORATION with BILATERAL NIMS NERVE monitoring and cervical thymectomy ;  Surgeon: Topher Ramirez M.D.;  Location: SURGERY SAME DAY [facility];  Service:    • AV FISTULA  CREATION  12/22/2014    Performed by Anselmo Felix M.D. at SURGERY [facility]   • AV FISTULA REVISION  10/28/2013    Performed by Anselmo Felix M.D. at SURGERY [facility]   • APPENDECTOMY LAPAROSCOPIC  11/10/2009    Performed by VLADISLAV PALACIO at SURGERY [facility]   • AV FISTULA REVISION  10/13/2009    Performed by ANSELMO FELIX at SURGERY [facility]   • CATH PLACEMENT  10/13/2009    Performed by ANSELMO FELIX at SURGERY [facility]   • AV FISTULA CREATION  2005    left arm     Family History   Problem Relation Age of Onset   • Depression Mother    • No Known Problems Sister    • No Known Problems Sister    • No Known Problems Sister      Social History     Social History   • Marital status: Single     Spouse name: N/A   • Number of children: N/A   • Years of education: N/A     Occupational History   • Not on file.     Social History Main Topics   • Smoking status: Former Smoker     Packs/day: 0.25     Years: 8.00     Quit date: 11/28/2016   • Smokeless tobacco: Never Used   • Alcohol use Yes      Comment: 1x per month   • Drug use: No   • Sexual activity: No     Other Topics Concern   • Not on file     Social History Narrative   • No narrative on file     Allergies   Allergen Reactions   • Bloodless    • Vancomycin Itching     Pt states itching.     Outpatient Encounter Prescriptions as of 8/29/2018   Medication Sig Dispense Refill   • calcium acetate (PHOS-LO) 667 MG Cap Take 1,334 mg by mouth 3 times a day, with meals. Taking 6 capsules per day.     • isotretinoin (MYORISAN) 20 MG capsule Take 20 mg by mouth every day.     • carvedilol (COREG) 6.25 MG Tab Take 1 Tab by mouth 2 times a day, with meals. 180 Tab 3   • lisinopril (PRINIVIL) 5 MG Tab TAKE ONE TABLET BY MOUTH ONCE DAILY 90 Tab 3   • levETIRAcetam (KEPPRA) 500 MG Tab 1 tab po bid, and also on HD day, take an extra tab right after dialysis. 90 Tab 11     No facility-administered encounter medications on file as of  "8/29/2018.      Review of Systems   Constitutional: Negative for chills, fever, malaise/fatigue and weight loss.   HENT: Negative for ear discharge, ear pain, hearing loss and nosebleeds.    Eyes: Negative for blurred vision, double vision, pain and discharge.   Respiratory: Positive for shortness of breath. Negative for cough.    Cardiovascular: Negative for chest pain, palpitations, orthopnea, claudication, leg swelling and PND.   Gastrointestinal: Negative for abdominal pain, blood in stool, melena, nausea and vomiting.   Genitourinary: Negative for dysuria and hematuria.   Musculoskeletal: Negative for falls, joint pain and myalgias.   Skin: Negative for itching and rash.   Neurological: Negative for dizziness, sensory change, speech change, loss of consciousness and headaches.   Endo/Heme/Allergies: Negative for environmental allergies. Does not bruise/bleed easily.   Psychiatric/Behavioral: Negative for depression, hallucinations and suicidal ideas.        Objective:   /84   Pulse 90   Ht 1.702 m (5' 7\")   Wt 63.5 kg (140 lb)   SpO2 99%   BMI 21.93 kg/m²     Physical Exam   Constitutional: He is oriented to person, place, and time. No distress.   HENT:   Head: Normocephalic and atraumatic.   Right Ear: External ear normal.   Left Ear: External ear normal.   Eyes: Right eye exhibits no discharge. Left eye exhibits no discharge.   Neck: No JVD present. No thyromegaly present.   Cardiovascular: Normal rate, regular rhythm, normal heart sounds and intact distal pulses.  Exam reveals no gallop and no friction rub.    No murmur heard.  Pulmonary/Chest: Breath sounds normal. No respiratory distress.   Abdominal: Bowel sounds are normal. He exhibits no distension. There is no tenderness.   Musculoskeletal: He exhibits no edema or tenderness.   Neurological: He is alert and oriented to person, place, and time. No cranial nerve deficit.   Skin: Skin is warm and dry. He is not diaphoretic.   Psychiatric: He " has a normal mood and affect. His behavior is normal.   Nursing note and vitals reviewed.      Assessment:     1. ACC/AHA stage C systolic heart failure (HCC)     2. Heart failure, NYHA class 2 (HCC)     3. End stage renal disease (HCC)     4. HTN (hypertension), malignant     5. Dyspnea on exertion     6. Dialysis patient (HCC)         Medical Decision Making:  Today's Assessment / Status / Plan:   Patient has high mortality rate given cardiac condition along with social issues.    Blood pressure is high because patient did not take his carvedilol today.    Cont current medications at current dose.     Limited options overall.    Poor insight into his medical problems.    Last hospitalization for HF was 08/2017.    Will continue to closely monitor for side effects of patient's high risk medication(s) including liver, renal function and electrolytes.    I will see patient back in our Heart Failure Clinic with lab tests and studies results in 12 weeks.    I thank you for referring patient to our Heart Failure Clinic today.

## 2018-10-16 NOTE — ED TRIAGE NOTES
".  Chief Complaint   Patient presents with   • Vascular Access Problem     left upper arm.      Reports scab and bleeding to skin at fistula site d/t \"thinning skin\". Pt received dialysis today. Dressing in place. Pt has new fistula to right fa.   "

## 2018-10-16 NOTE — ED NOTES
Assumed care of pt from waiting room. Pt ambulatory to room with steady gait.  Fall precautions in place. Call bell in reach.   Reported fistula bleeding controlled at present. Bandage CDI.    Awaiting MD eval/orders. Ongoing monitoring.

## 2018-10-16 NOTE — ED PROVIDER NOTES
ED Provider Note    CHIEF COMPLAINT  Chief Complaint   Patient presents with   • Vascular Access Problem     left upper arm.        HPI  Otoniel Wing is a 28 y.o. male who presents with some subtle bruising with an ulcer approximately 1 x 1 cm on his left upper extremity at site of his fistula. The patient has a complex extensive medical history is of the blow. He goes to dialysis Tuesday Thursday and Saturday. He went to dialysis this morning. The area in question is not actually where his fistula was accessed today. He has a small ulcer that they were concerned about but he did not have any pulsatile or active bleeding today. He has no other complaints at this time  REVIEW OF SYSTEMS  See HPI for further details. No high fevers chills night was weight loss numbness tingling weakness All other systems are negative.     PAST MEDICAL HISTORY  Past Medical History:   Diagnosis Date   • Pneumonia 8/2015   • Chest pain 11/18/2014   • CHF (congestive heart failure) ([facility]) 1/27/2014   • Changing skin lesion 8/17/2009   • Anesthesia     PONV-non recently   • Breath shortness     with chf exacerbation/fluid overload   • Encounter for renal dialysis     Tues, Thurs, Sat   • History of anemia     due to renal function   • Hypertension    • Indigestion    • Pain     bone pain due to parathyroid   • Renal disorder L arm fistula   • Renal failure        FAMILY HISTORY  No history of bleeding disorder    SOCIAL HISTORY  Social History     Social History   • Marital status: Single     Spouse name: N/A   • Number of children: N/A   • Years of education: N/A     Social History Main Topics   • Smoking status: Former Smoker     Packs/day: 0.25     Years: 8.00     Quit date: 11/28/2016   • Smokeless tobacco: Never Used   • Alcohol use Yes      Comment: 1x per month   • Drug use: No   • Sexual activity: No     Other Topics Concern   • Not on file     Social History Narrative   • No narrative on file   No IV drugs  "    SURGICAL HISTORY  Past Surgical History:   Procedure Laterality Date   • AV FISTULA CREATION Right 05/2018   • PARATHYROID EXPLORATION  8/10/2016    Procedure: PARATHYROID EXPLORATION with BILATERAL NIMS NERVE monitoring and cervical thymectomy ;  Surgeon: Topher Ramirez M.D.;  Location: SURGERY SAME DAY [facility];  Service:    • AV FISTULA CREATION  12/22/2014    Performed by Anselmo Felix M.D. at SURGERY [facility]   • AV FISTULA REVISION  10/28/2013    Performed by Anselmo Felix M.D. at SURGERY [facility]   • APPENDECTOMY LAPAROSCOPIC  11/10/2009    Performed by VLADISLAV PALACIO at SURGERY [facility]   • AV FISTULA REVISION  10/13/2009    Performed by ANSELMO FELIX at SURGERY [facility]   • CATH PLACEMENT  10/13/2009    Performed by ANSELMO FELIX at SURGERY [facility]   • AV FISTULA CREATION  2005    left arm       CURRENT MEDICATIONS  Home Medications    **Home medications have not yet been reviewed for this encounter**     No current facility-administered medications for this encounter.     Current Outpatient Prescriptions:   •  calcium acetate (PHOS-LO) 667 MG Cap, Take 1,334 mg by mouth 3 times a day, with meals. Taking 6 capsules per day., Disp: , Rfl:   •  isotretinoin (MYORISAN) 20 MG capsule, Take 20 mg by mouth every day., Disp: , Rfl:   •  carvedilol (COREG) 6.25 MG Tab, Take 1 Tab by mouth 2 times a day, with meals., Disp: 180 Tab, Rfl: 3  •  lisinopril (PRINIVIL) 5 MG Tab, TAKE ONE TABLET BY MOUTH ONCE DAILY, Disp: 90 Tab, Rfl: 3  •  levETIRAcetam (KEPPRA) 500 MG Tab, 1 tab po bid, and also on HD day, take an extra tab right after dialysis., Disp: 90 Tab, Rfl: 11      ALLERGIES  Allergies   Allergen Reactions   • Bloodless    • Vancomycin Itching     Pt states itching.       PHYSICAL EXAM  VITAL SIGNS: /78   Pulse 80   Temp 36.2 °C (97.2 °F)   Resp 16   Ht 1.702 m (5' 7\")   Wt 63.3 kg (139 lb 8.8 oz)   SpO2 98%   BMI 21.86 kg/m²  Room air O2: " 98    Constitutional: Well developed, Well nourished, No acute distress, Non-toxic appearance.   HENT: Normocephalic, Atraumatic, Bilateral external ears normal, Oropharynx moist, No oral exudates, Nose normal.   Eyes: PERRLA, EOMI, Conjunctiva normal, No discharge.   Neck: Normal range of motion, No tenderness, Supple, No stridor.   Cardiovascular: Normal heart rate, Normal rhythm, No murmurs, No rubs, No gallops.   Thorax & Lungs: Normal breath sounds, No respiratory distress, No wheezing, No chest tenderness.   Abdomen: Bowel sounds normal, Soft, No tenderness, No masses, No pulsatile masses.   Skin: Warm, Dry, No erythema, No rash.   Back: No tenderness, No CVA tenderness.   Extremities: Left upper extremity is notable for a significantly bulbous fistula. There is a 1 x 1 cm area of ulceration with slow oozing but no active arterial bleeding. Neurologic: Alert & oriented x 3, Normal motor function, Normal sensory function, No focal deficits noted.   Psychiatric: Affect normal, Judgment normal, Mood normal.       COURSE & MEDICAL DECISION MAKING  Pertinent Labs & Imaging studies reviewed. (See chart for details)  Observe the patient and recheck in 3 times were. Now her. He did not have any active bleeding. I'm most concerned about this ulcer that appears to be not healing. I gave the patient supplies for dressing changes and I applied a nonadhesive dressing with antibiotic coated non-adhesive dressing and some cold and says that the tape does not cause increasing skin breakdown. I will provide the patient was supplies for dressing changes once daily and recommended he return as needed for new or worsening symptoms    FINAL IMPRESSION  1. Oozing from left upper extremity AV fistula with skin ulceration         Electronically signed by: Michael Thomas, 10/16/2018 11:59 AM

## 2018-10-16 NOTE — ED NOTES
Discharged home. Pt given instruction on dressing and wound care. Follow up with vascular surgeon.

## 2018-12-10 NOTE — DISCHARGE INSTRUCTIONS
ACTIVITY: Rest and take it easy for the first 24 hours.  A responsible adult is recommended to remain with you during that time.  It is normal to feel sleepy.  We encourage you to not do anything that requires balance, judgment or coordination.    MILD FLU-LIKE SYMPTOMS ARE NORMAL. YOU MAY EXPERIENCE GENERALIZED MUSCLE ACHES, THROAT IRRITATION, HEADACHE AND/OR SOME NAUSEA.    FOR 24 HOURS DO NOT:  Drive, operate machinery or run household appliances.  Drink beer or alcoholic beverages.   Make important decisions or sign legal documents.    SPECIAL INSTRUCTIONS:     AV Fistula D/C instructions:     * Remove ace wrap 2 hours post-procedure if present.     1. DIET: Upon discharge from the hospital you may resume your normal preoperative diet. Depending on how you are feeling and whether you have nausea or not, you may wish to stay with a bland diet for the first few days. However, you can advance this as quickly as you feel ready.     2. ACTIVITIES: After discharge from the hospital, you may resume full routine activities. However, there should be no heavy lifting (greater than 15 pounds) and no strenuous activities until after your follow-up visit. Otherwise, routine activities of daily living are acceptable.     3. DRIVING: You may drive whenever you are off pain medications and are able to perform the activities needed to drive, i.e. turning, bending, twisting, etc.     4. BATHING: You may get the wound wet at any time after leaving the hospital. You may shower, but do not submerge in a bath for at least a week. Dressings may come off after 48 hours. Leave steri strips on until they fall off.  It may be necessary to trim the edges.     5. BOWEL FUNCTION: Constipation is common after an operation, especially with pain medications. The combination of pain medication and decreased activity level can cause constipation in otherwise normal patients. If you feel this is occurring, take a laxative (Milk of Magnesia,  Ex-Lax, Senokot, etc.) until the problem has resolved.     6. PAIN MEDICATION: You will be given a prescription for pain medication at discharge. Please take these as directed. It is important to remember not to take medications on an empty stomach as this may cause nausea.     7.CALL IF YOU HAVE: (1) Fevers to more than 1010 F, (2) Unusual chest or leg pain, (3) Drainage or fluid from incision that may be foul smelling, increased tenderness or soreness at the wound or the wound edges are no longer together, redness or swelling at the incision site. Please do not hesitate to call with any other questions.     8. APPOINTMENT: Follow up [facility] as instructed.    DIET: See above instructions, #1. To avoid nausea, slowly advance diet as tolerated, avoiding spicy or greasy foods for the first day.  Add more substantial food to your diet according to your physician's instructions.  Babies can be fed formula or breast milk as soon as they are hungry.  INCREASE FLUIDS AND FIBER TO AVOID CONSTIPATION.    FOLLOW-UP APPOINTMENT:  A follow-up appointment should be arranged with your doctor in 1-2 weeks or as instructed by physician if different; call to schedule.    You should CALL YOUR PHYSICIAN if you develop:  Fever greater than 101 degrees F.  Pain not relieved by medication, or persistent nausea or vomiting.  Excessive bleeding (blood soaking through dressing) or unexpected drainage from the wound.  Extreme redness or swelling around the incision site, drainage of pus or foul smelling drainage.  Inability to urinate or empty your bladder within 8 hours.  Problems with breathing or chest pain.    You should call 911 if you develop problems with breathing or chest pain.  If you are unable to contact your doctor or surgical center, you should go to the nearest emergency room or urgent care center.  Physician's telephone #: [phone]    If any questions arise, call your doctor.  If your doctor is not  available, please feel free to call the Surgical Center at [phone].  The Center is open Monday through Friday from 7AM to 7PM.  You can also call the HEALTH HOTLINE open 24 hours/day, 7 days/week and speak to a nurse at [phone], or toll free at [phone].    A registered nurse may call you a few days after your surgery to see how you are doing after your procedure.    MEDICATIONS: Resume taking daily medication.  Take prescribed pain medication with food.  If no medication is prescribed, you may take non-aspirin pain medication if needed.  PAIN MEDICATION CAN BE VERY CONSTIPATING.  Take a stool softener or laxative such as senokot, pericolace, or milk of magnesia if needed.    Prescription given for Norco (Pain).  Last pain medication given at 1222.    If your physician has prescribed pain medication that includes Acetaminophen (Tylenol), do not take additional Acetaminophen (Tylenol) while taking the prescribed medication.    Depression / Suicide Risk    As you are discharged from this [facility] facility, it is important to learn how to keep safe from harming yourself.    Recognize the warning signs:  · Abrupt changes in personality, positive or negative- including increase in energy   · Giving away possessions  · Change in eating patterns- significant weight changes-  positive or negative  · Change in sleeping patterns- unable to sleep or sleeping all the time   · Unwillingness or inability to communicate  · Depression  · Unusual sadness, discouragement and loneliness  · Talk of wanting to die  · Neglect of personal appearance   · Rebelliousness- reckless behavior  · Withdrawal from people/activities they love  · Confusion- inability to concentrate     If you or a loved one observes any of these behaviors or has concerns about self-harm, here's what you can do:  · Talk about it- your feelings and reasons for harming yourself  · Remove any means that you might use to hurt yourself (examples:  pills, rope, extension cords, firearm)  · Get professional help from the community (Mental Health, Substance Abuse, psychological counseling)  · Do not be alone:Call your Safe Contact- someone whom you trust who will be there for you.  · Call your local CRISIS HOTLINE 924-6751 or 370-596-9793  · Call your local Children's Mobile Crisis Response Team [place] (434) 413-4787 or www.[employer]  · Call the toll free National Suicide Prevention Hotlines   · National Suicide Prevention Lifeline 336-752-GQES (6657)  · National Hope Line Network 800-SUICIDE (875-2786)

## 2018-12-10 NOTE — OR NURSING
Patient A&Ox4 and cooperative. Pt nausea resolved and pain a 4/10 and tolerable. Pt BP decreasing, medicating per MAR. Pt has dressing to left upper arm that is covered with ace bandage. Dressing is CDI. VSS and tolerating water without issue. Will continue to monitor.

## 2018-12-10 NOTE — H&P
Date of Service:  12/10/2018      Dialysis Access History and Physical     PCP: Pcp Pt States None    CC:  Need for ligation of left sided AVF. He has a functioning right AVF.    HPI: This is a 28 y.o. male with chronic kidney disease who needs ligation of left AVF.    ROS: As above. The remainder of a complete review of systems is negative in all systems except as noted.    PMHx:  Active Ambulatory Problems     Diagnosis Date Noted   • Cystic acne 08/12/2009   • Renal failure 08/17/2009   • Dialysis patient ([facility]) 01/25/2012   • HTN (hypertension) 01/25/2012   • ESRD (end stage renal disease) (CMS-HCC) 10/28/2013   • CHF (congestive heart failure) ([facility]) 01/27/2014   • Pulmonary edema 01/28/2014   • Anemia of chronic renal failure 01/28/2014   • Chest pain 11/18/2014   • End stage renal disease ([facility]) 12/22/2014   • Depressive disorder 12/28/2016   • Cardiomyopathy due to hypertension, with heart failure (CMS-HCC) ef 30% 03/02/2017   • ACC/AHA stage C systolic heart failure ([facility]) 04/14/2017   • Hypertension 07/29/2017   • ESRD on hemodialysis ([facility]) 07/29/2017     Resolved Ambulatory Problems     Diagnosis Date Noted   • Changing skin lesion 08/17/2009   • Hyperkalemia 04/26/2012   • PNA (pneumonia) 11/18/2014   • Volume overload 11/19/2014   • Hyperparathyroidism due to renal insufficiency ([facility]) 08/10/2016   • SVT (supraventricular tachycardia) ([facility]) 02/28/2017   • Hypokalemia 03/01/2017   • Upper respiratory tract infection 03/01/2017   • Grand mal seizure ([facility]) 07/29/2017   • Seizure ([facility]) 07/30/2017     Past Medical History:   Diagnosis Date   • Anemia    • Breath shortness    • Changing skin lesion 8/17/2009   • Chest pain 11/18/2014   • CHF (congestive heart failure) ([facility]) 1/27/2014   • Encounter for renal dialysis    • ESRF (end stage renal failure) ([facility])    • HF (heart failure), systolic ([facility])    • History of anemia    • Hypertension    • Pneumonia 8/2015       SHx:  Social History     Social History   •  "Marital status: Single     Spouse name: N/A   • Number of children: N/A   • Years of education: N/A     Occupational History   • Not on file.     Social History Main Topics   • Smoking status: Former Smoker     Packs/day: 0.50     Years: 5.00     Types: Cigarettes     Quit date: 11/28/2016   • Smokeless tobacco: Never Used   • Alcohol use Yes      Comment: 2-3/per month   • Drug use: No   • Sexual activity: No     Other Topics Concern   • Not on file     Social History Narrative   • No narrative on file       FHx:  family history includes Depression in his mother; No Known Problems in his sister, sister, and sister.    Allergies:  Allergies   Allergen Reactions   • Vancomycin Itching     Pt states itching.       Medications:  No current facility-administered medications on file prior to encounter.      Current Outpatient Prescriptions on File Prior to Encounter   Medication Sig Dispense Refill   • calcium acetate (PHOS-LO) 667 MG Cap Take 1,334 mg by mouth 3 times a day, with meals.     • isotretinoin (MYORISAN) 20 MG capsule Take 40 mg by mouth See Admin Instructions. 40 mg twice daily on Tue, Thurs, Sat. 40 mg once daily on Sun, Mon, Wed, Fri     • carvedilol (COREG) 6.25 MG Tab Take 1 Tab by mouth 2 times a day, with meals. 180 Tab 3   • lisinopril (PRINIVIL) 5 MG Tab TAKE ONE TABLET BY MOUTH ONCE DAILY 90 Tab 3   • levETIRAcetam (KEPPRA) 500 MG Tab 1 tab po bid, and also on HD day, take an extra tab right after dialysis. 90 Tab 11       Objective Exam:  Vitals:    12/10/18 1009 12/10/18 1017   BP:  (!) 175/86   Pulse:  85   Resp:  18   Temp:  36.1 °C (96.9 °F)   TempSrc:  Temporal   SpO2:  97%   Weight: 64.1 kg (141 lb 5 oz)    Height: 1.702 m (5' 7\")          General: Awake, alert  HEENT: Normocephalic, atraumatic  Chest: Clear  CV: RRR  Abd: Soft  Neuro: Intact  Skin: Intact  Ext:  Left and Right AVF    Laboratory--reviewed personally and are as follows:  Lab Results   Component Value Date/Time    WBC 3.7 (L) " 12/07/2018 09:05 AM    RBC 3.61 (L) 12/07/2018 09:05 AM    HEMOGLOBIN 11.3 (L) 12/07/2018 09:05 AM    HEMATOCRIT 36.0 (L) 12/07/2018 09:05 AM    MCV 99.7 (H) 12/07/2018 09:05 AM    MCH 31.3 12/07/2018 09:05 AM    MCHC 31.4 (L) 12/07/2018 09:05 AM    MPV 10.4 12/07/2018 09:05 AM    NEUTSPOLYS 65.90 03/27/2018 11:38 AM    LYMPHOCYTES 25.70 03/27/2018 11:38 AM    MONOCYTES 6.80 03/27/2018 11:38 AM    EOSINOPHILS 1.00 03/27/2018 11:38 AM    BASOPHILS 0.30 03/27/2018 11:38 AM    HYPOCHROMIA 1+ 03/31/2006 09:30 AM    ANISOCYTOSIS 1+ 04/24/2007 11:00 AM      Lab Results   Component Value Date/Time    SODIUM 142 12/07/2018 09:05 AM    POTASSIUM 4.5 12/07/2018 09:05 AM    CHLORIDE 103 12/07/2018 09:05 AM    CO2 21 12/07/2018 09:05 AM    GLUCOSE 84 12/07/2018 09:05 AM    BUN 61 (H) 12/07/2018 09:05 AM    CREATININE 11.90 (HH) 12/07/2018 09:05 AM    CREATININE 7.5 (HH) 06/11/2007 11:50 PM      Lab Results   Component Value Date/Time    PROTHROMBTM 13.8 03/27/2018 11:38 AM    INR 1.09 03/27/2018 11:38 AM            MDM:  28 y.o. male here for ligation left AVF.    Assessment/Plan:  Active Problems:    * No active hospital problems. *  Resolved Problems:    * No resolved hospital problems. *      Planned Procedure:  Left AVF ligation

## 2018-12-10 NOTE — OR NURSING
Rec pt from PACU at 1355.  Oriented to room.  States pain 4/10.  Ace wrap on left arm, fingers warm with brisk cap refill.  At 1405, removed ace wrap per MD order.  Gauze and tegaderm on upper arm with slight pink shadowing.  No swelling noted.  Assisted OOB to dress and into recliner.  Tolerated well.  Waiting for ride.

## 2018-12-10 NOTE — OP REPORT
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    ASSISTANT - LEIGH Diggs    TYPE OF ANESTHESIA - General     ANESTHESIOLOGIST  - [facility]      SPECIMENS - aneurysm       PROCEDURE IN DETAIL -patient was taken to the operating suite placed in the supine position.  Patient's left arm was circumferentially prepped and draped in sterile fashion peer an ellipse incision was made longitudinally over the degenerated aneurysmal cephalic vein fistula.  Incision was carried up to the near the arteriovenous anastomosis.  The proximal portion of the fistula was circumferentially dissected and isolated.  A hemostat was placed across the fistula and it was transected.  3-0 Prolene vertical mattress and running sutures were used to oversew the proximal portion of the AV fistula.  Next using electrocautery and blunt dissection the aneurysm itself was resected.  Electrocautery was used to dissected the aneurysm circumferentially with extending the incisions proximally up toward the deltoid bicep groove.  The entire aneurysmal fistula was dissected circumferentially and mobilized and the distal aspect was transected and oversewn using 3-0 Prolene suture in vertical mattress in a running fashion.  The area was irrigated hemostasis was assured 3-0 Vicryl subcutaneous sutures were placed followed by skin staples.  Sterile dressings were applied and the patient's arm was wrapped using an Ace bandage.      Chris Felix M.D.

## 2018-12-11 NOTE — OR NURSING
Patient discharged home with family member. Verbalized understanding of discharge instructions. Transported to [facility] via wheelchair without incident.

## 2019-01-01 ENCOUNTER — TELEPHONE (OUTPATIENT)
Dept: CARDIOLOGY | Facility: MEDICAL CENTER | Age: 29
End: 2019-01-01

## 2019-01-01 ENCOUNTER — HOME CARE VISIT (OUTPATIENT)
Dept: HOSPICE | Facility: HOSPICE | Age: 29
End: 2019-01-01
Payer: COMMERCIAL

## 2019-01-01 ENCOUNTER — HOSPITAL ENCOUNTER (OUTPATIENT)
Facility: MEDICAL CENTER | Age: 29
End: 2019-10-12
Attending: EMERGENCY MEDICINE | Admitting: HOSPITALIST
Payer: COMMERCIAL

## 2019-01-01 ENCOUNTER — HOSPITAL ENCOUNTER (OUTPATIENT)
Facility: MEDICAL CENTER | Age: 29
End: 2019-06-06
Attending: PLASTIC SURGERY | Admitting: PLASTIC SURGERY
Payer: COMMERCIAL

## 2019-01-01 ENCOUNTER — HOSPITAL ENCOUNTER (OUTPATIENT)
Dept: LAB | Facility: MEDICAL CENTER | Age: 29
End: 2019-03-13
Attending: NURSE PRACTITIONER
Payer: COMMERCIAL

## 2019-01-01 ENCOUNTER — OFFICE VISIT (OUTPATIENT)
Dept: URGENT CARE | Facility: CLINIC | Age: 29
End: 2019-01-01
Payer: COMMERCIAL

## 2019-01-01 ENCOUNTER — HOSPICE ADMISSION (OUTPATIENT)
Dept: HOSPICE | Facility: HOSPICE | Age: 29
End: 2019-01-01
Payer: COMMERCIAL

## 2019-01-01 ENCOUNTER — APPOINTMENT (OUTPATIENT)
Dept: RADIOLOGY | Facility: IMAGING CENTER | Age: 29
End: 2019-01-01
Attending: PHYSICIAN ASSISTANT
Payer: COMMERCIAL

## 2019-01-01 ENCOUNTER — ANESTHESIA EVENT (OUTPATIENT)
Dept: SURGERY | Facility: MEDICAL CENTER | Age: 29
End: 2019-01-01
Payer: COMMERCIAL

## 2019-01-01 ENCOUNTER — ANESTHESIA (OUTPATIENT)
Dept: SURGERY | Facility: MEDICAL CENTER | Age: 29
End: 2019-01-01
Payer: COMMERCIAL

## 2019-01-01 ENCOUNTER — HOSPITAL ENCOUNTER (EMERGENCY)
Facility: MEDICAL CENTER | Age: 29
End: 2019-03-19
Attending: EMERGENCY MEDICINE
Payer: COMMERCIAL

## 2019-01-01 ENCOUNTER — OFFICE VISIT (OUTPATIENT)
Dept: CARDIOLOGY | Facility: MEDICAL CENTER | Age: 29
End: 2019-01-01
Payer: COMMERCIAL

## 2019-01-01 ENCOUNTER — APPOINTMENT (OUTPATIENT)
Dept: RADIOLOGY | Facility: MEDICAL CENTER | Age: 29
End: 2019-01-01
Attending: EMERGENCY MEDICINE
Payer: COMMERCIAL

## 2019-01-01 ENCOUNTER — TELEPHONE (OUTPATIENT)
Dept: NEUROLOGY | Facility: MEDICAL CENTER | Age: 29
End: 2019-01-01

## 2019-01-01 VITALS
DIASTOLIC BLOOD PRESSURE: 90 MMHG | TEMPERATURE: 97.8 F | SYSTOLIC BLOOD PRESSURE: 138 MMHG | WEIGHT: 140 LBS | BODY MASS INDEX: 21.97 KG/M2 | OXYGEN SATURATION: 96 % | HEIGHT: 67 IN | HEART RATE: 100 BPM | RESPIRATION RATE: 16 BRPM

## 2019-01-01 VITALS
TEMPERATURE: 98 F | SYSTOLIC BLOOD PRESSURE: 109 MMHG | HEIGHT: 67 IN | WEIGHT: 140.43 LBS | DIASTOLIC BLOOD PRESSURE: 72 MMHG | HEART RATE: 76 BPM | BODY MASS INDEX: 22.04 KG/M2 | OXYGEN SATURATION: 97 % | RESPIRATION RATE: 16 BRPM

## 2019-01-01 VITALS
BODY MASS INDEX: 21.94 KG/M2 | SYSTOLIC BLOOD PRESSURE: 174 MMHG | HEART RATE: 111 BPM | HEIGHT: 67 IN | WEIGHT: 139.8 LBS | OXYGEN SATURATION: 96 % | DIASTOLIC BLOOD PRESSURE: 128 MMHG

## 2019-01-01 VITALS — DIASTOLIC BLOOD PRESSURE: 80 MMHG | HEART RATE: 112 BPM | SYSTOLIC BLOOD PRESSURE: 160 MMHG | RESPIRATION RATE: 16 BRPM

## 2019-01-01 VITALS — DIASTOLIC BLOOD PRESSURE: 80 MMHG | HEART RATE: 84 BPM | RESPIRATION RATE: 20 BRPM | SYSTOLIC BLOOD PRESSURE: 144 MMHG

## 2019-01-01 VITALS
OXYGEN SATURATION: 95 % | SYSTOLIC BLOOD PRESSURE: 167 MMHG | WEIGHT: 136.24 LBS | HEIGHT: 67 IN | TEMPERATURE: 98.2 F | HEART RATE: 100 BPM | RESPIRATION RATE: 18 BRPM | DIASTOLIC BLOOD PRESSURE: 108 MMHG | BODY MASS INDEX: 21.38 KG/M2

## 2019-01-01 VITALS
OXYGEN SATURATION: 99 % | WEIGHT: 140 LBS | SYSTOLIC BLOOD PRESSURE: 168 MMHG | HEART RATE: 102 BPM | RESPIRATION RATE: 16 BRPM | DIASTOLIC BLOOD PRESSURE: 88 MMHG | BODY MASS INDEX: 21.97 KG/M2 | HEIGHT: 67 IN | TEMPERATURE: 97.4 F

## 2019-01-01 VITALS
WEIGHT: 145 LBS | DIASTOLIC BLOOD PRESSURE: 86 MMHG | SYSTOLIC BLOOD PRESSURE: 141 MMHG | HEART RATE: 90 BPM | OXYGEN SATURATION: 26 % | RESPIRATION RATE: 16 BRPM | BODY MASS INDEX: 21.98 KG/M2 | TEMPERATURE: 96.1 F | HEIGHT: 68 IN

## 2019-01-01 VITALS — HEART RATE: 80 BPM | DIASTOLIC BLOOD PRESSURE: 100 MMHG | SYSTOLIC BLOOD PRESSURE: 150 MMHG | RESPIRATION RATE: 20 BRPM

## 2019-01-01 VITALS — HEART RATE: 104 BPM | RESPIRATION RATE: 18 BRPM | DIASTOLIC BLOOD PRESSURE: 96 MMHG | SYSTOLIC BLOOD PRESSURE: 150 MMHG

## 2019-01-01 VITALS — RESPIRATION RATE: 16 BRPM | DIASTOLIC BLOOD PRESSURE: 70 MMHG | SYSTOLIC BLOOD PRESSURE: 144 MMHG | HEART RATE: 106 BPM

## 2019-01-01 VITALS — DIASTOLIC BLOOD PRESSURE: 80 MMHG | HEART RATE: 78 BPM | RESPIRATION RATE: 20 BRPM | SYSTOLIC BLOOD PRESSURE: 120 MMHG

## 2019-01-01 VITALS — RESPIRATION RATE: 18 BRPM

## 2019-01-01 DIAGNOSIS — G89.18 ACUTE POST-OPERATIVE PAIN: ICD-10-CM

## 2019-01-01 DIAGNOSIS — R00.0 HEART RATE FAST: ICD-10-CM

## 2019-01-01 DIAGNOSIS — J06.9 VIRAL UPPER RESPIRATORY TRACT INFECTION: ICD-10-CM

## 2019-01-01 DIAGNOSIS — Z99.2 ESRD ON HEMODIALYSIS (HCC): ICD-10-CM

## 2019-01-01 DIAGNOSIS — R06.09 DYSPNEA ON EXERTION: ICD-10-CM

## 2019-01-01 DIAGNOSIS — Z51.5 HOSPICE CARE PATIENT: ICD-10-CM

## 2019-01-01 DIAGNOSIS — N18.6 ESRD ON HEMODIALYSIS (HCC): ICD-10-CM

## 2019-01-01 DIAGNOSIS — Z01.812 PRE-OPERATIVE LABORATORY EXAMINATION: ICD-10-CM

## 2019-01-01 DIAGNOSIS — N18.6 END STAGE RENAL DISEASE (HCC): ICD-10-CM

## 2019-01-01 DIAGNOSIS — N18.9 CHRONIC RENAL FAILURE, UNSPECIFIED CKD STAGE: ICD-10-CM

## 2019-01-01 DIAGNOSIS — Z99.2 DIALYSIS PATIENT (HCC): ICD-10-CM

## 2019-01-01 DIAGNOSIS — I10 ESSENTIAL HYPERTENSION: ICD-10-CM

## 2019-01-01 DIAGNOSIS — J20.9 ACUTE BRONCHITIS, UNSPECIFIED ORGANISM: ICD-10-CM

## 2019-01-01 DIAGNOSIS — I50.9 HEART FAILURE, NYHA CLASS 2 (HCC): ICD-10-CM

## 2019-01-01 DIAGNOSIS — J06.9 ACUTE URI: ICD-10-CM

## 2019-01-01 DIAGNOSIS — I50.20 ACC/AHA STAGE C SYSTOLIC HEART FAILURE (HCC): ICD-10-CM

## 2019-01-01 DIAGNOSIS — I47.20 VENTRICULAR TACHYCARDIA (HCC): ICD-10-CM

## 2019-01-01 LAB
ANION GAP SERPL CALC-SCNC: 17 MMOL/L (ref 0–11.9)
ANION GAP SERPL CALC-SCNC: 18 MMOL/L (ref 0–11.9)
BLOOD CULTURE HOLD CXBCH: NORMAL
BUN BLD-MCNC: 43 MG/DL (ref 8–22)
BUN SERPL-MCNC: 78 MG/DL (ref 8–22)
BUN SERPL-MCNC: 98 MG/DL (ref 8–22)
CA-I BLD ISE-SCNC: 1.04 MMOL/L (ref 1.1–1.3)
CALCIUM SERPL-MCNC: 8.5 MG/DL (ref 8.5–10.5)
CALCIUM SERPL-MCNC: 9 MG/DL (ref 8.5–10.5)
CHLORIDE BLD-SCNC: 97 MMOL/L (ref 96–112)
CHLORIDE SERPL-SCNC: 100 MMOL/L (ref 96–112)
CHLORIDE SERPL-SCNC: 100 MMOL/L (ref 96–112)
CO2 BLD-SCNC: 25 MMOL/L (ref 20–33)
CO2 SERPL-SCNC: 20 MMOL/L (ref 20–33)
CO2 SERPL-SCNC: 22 MMOL/L (ref 20–33)
CREAT BLD-MCNC: 8.5 MG/DL (ref 0.5–1.4)
CREAT SERPL-MCNC: 11.33 MG/DL (ref 0.5–1.4)
CREAT SERPL-MCNC: 12.11 MG/DL (ref 0.5–1.4)
EKG IMPRESSION: NORMAL
EKG IMPRESSION: NORMAL
ERYTHROCYTE [DISTWIDTH] IN BLOOD BY AUTOMATED COUNT: 46.3 FL (ref 35.9–50)
GLUCOSE BLD-MCNC: 87 MG/DL (ref 65–99)
GLUCOSE SERPL-MCNC: 88 MG/DL (ref 65–99)
GLUCOSE SERPL-MCNC: 94 MG/DL (ref 65–99)
HCT VFR BLD AUTO: 37.7 % (ref 42–52)
HCT VFR BLD CALC: 37 % (ref 42–52)
HGB BLD-MCNC: 12.3 G/DL (ref 14–18)
HGB BLD-MCNC: 12.6 G/DL (ref 14–18)
INT CON NEG: NEGATIVE
INT CON POS: POSITIVE
MAGNESIUM SERPL-MCNC: 2.4 MG/DL (ref 1.5–2.5)
MCH RBC QN AUTO: 31.6 PG (ref 27–33)
MCHC RBC AUTO-ENTMCNC: 32.6 G/DL (ref 33.7–35.3)
MCV RBC AUTO: 96.9 FL (ref 81.4–97.8)
PLATELET # BLD AUTO: 144 K/UL (ref 164–446)
PMV BLD AUTO: 10.4 FL (ref 9–12.9)
POTASSIUM BLD-SCNC: 4.4 MMOL/L (ref 3.6–5.5)
POTASSIUM SERPL-SCNC: 4.9 MMOL/L (ref 3.6–5.5)
POTASSIUM SERPL-SCNC: 6.7 MMOL/L (ref 3.6–5.5)
RBC # BLD AUTO: 3.89 M/UL (ref 4.7–6.1)
S PYO AG THROAT QL: NEGATIVE
SODIUM BLD-SCNC: 134 MMOL/L (ref 135–145)
SODIUM SERPL-SCNC: 138 MMOL/L (ref 135–145)
SODIUM SERPL-SCNC: 139 MMOL/L (ref 135–145)
WBC # BLD AUTO: 5 K/UL (ref 4.8–10.8)

## 2019-01-01 PROCEDURE — 36415 COLL VENOUS BLD VENIPUNCTURE: CPT

## 2019-01-01 PROCEDURE — 99284 EMERGENCY DEPT VISIT MOD MDM: CPT

## 2019-01-01 PROCEDURE — 80048 BASIC METABOLIC PNL TOTAL CA: CPT

## 2019-01-01 PROCEDURE — G0299 HHS/HOSPICE OF RN EA 15 MIN: HCPCS

## 2019-01-01 PROCEDURE — A9270 NON-COVERED ITEM OR SERVICE: HCPCS | Performed by: ANESTHESIOLOGY

## 2019-01-01 PROCEDURE — 700111 HCHG RX REV CODE 636 W/ 250 OVERRIDE (IP): Performed by: EMERGENCY MEDICINE

## 2019-01-01 PROCEDURE — 700105 HCHG RX REV CODE 258: Performed by: EMERGENCY MEDICINE

## 2019-01-01 PROCEDURE — 87880 STREP A ASSAY W/OPTIC: CPT | Performed by: PHYSICIAN ASSISTANT

## 2019-01-01 PROCEDURE — S9126 HOSPICE CARE, IN THE HOME, P: HCPCS

## 2019-01-01 PROCEDURE — 160039 HCHG SURGERY MINUTES - EA ADDL 1 MIN LEVEL 3: Performed by: PLASTIC SURGERY

## 2019-01-01 PROCEDURE — 99214 OFFICE O/P EST MOD 30 MIN: CPT | Performed by: NURSE PRACTITIONER

## 2019-01-01 PROCEDURE — 160035 HCHG PACU - 1ST 60 MINS PHASE I: Performed by: PLASTIC SURGERY

## 2019-01-01 PROCEDURE — 96375 TX/PRO/DX INJ NEW DRUG ADDON: CPT

## 2019-01-01 PROCEDURE — 99220 PR INITIAL OBSERVATION CARE,LEVL III: CPT | Performed by: HOSPITALIST

## 2019-01-01 PROCEDURE — 160025 RECOVERY II MINUTES (STATS): Performed by: PLASTIC SURGERY

## 2019-01-01 PROCEDURE — 501838 HCHG SUTURE GENERAL: Performed by: PLASTIC SURGERY

## 2019-01-01 PROCEDURE — 160002 HCHG RECOVERY MINUTES (STAT): Performed by: PLASTIC SURGERY

## 2019-01-01 PROCEDURE — 83735 ASSAY OF MAGNESIUM: CPT

## 2019-01-01 PROCEDURE — 6650990 HC HOSPICE AND HOME CARE RX REV CODE 0250

## 2019-01-01 PROCEDURE — 80047 BASIC METABLC PNL IONIZED CA: CPT

## 2019-01-01 PROCEDURE — 99285 EMERGENCY DEPT VISIT HI MDM: CPT

## 2019-01-01 PROCEDURE — 700111 HCHG RX REV CODE 636 W/ 250 OVERRIDE (IP): Performed by: ANESTHESIOLOGY

## 2019-01-01 PROCEDURE — 96374 THER/PROPH/DIAG INJ IV PUSH: CPT

## 2019-01-01 PROCEDURE — G0155 HHCP-SVS OF CSW,EA 15 MIN: HCPCS

## 2019-01-01 PROCEDURE — 99214 OFFICE O/P EST MOD 30 MIN: CPT | Performed by: FAMILY MEDICINE

## 2019-01-01 PROCEDURE — 93005 ELECTROCARDIOGRAM TRACING: CPT

## 2019-01-01 PROCEDURE — 700102 HCHG RX REV CODE 250 W/ 637 OVERRIDE(OP): Performed by: EMERGENCY MEDICINE

## 2019-01-01 PROCEDURE — 85014 HEMATOCRIT: CPT

## 2019-01-01 PROCEDURE — 700105 HCHG RX REV CODE 258: Performed by: PLASTIC SURGERY

## 2019-01-01 PROCEDURE — 93000 ELECTROCARDIOGRAM COMPLETE: CPT | Performed by: INTERNAL MEDICINE

## 2019-01-01 PROCEDURE — 700111 HCHG RX REV CODE 636 W/ 250 OVERRIDE (IP)

## 2019-01-01 PROCEDURE — 71046 X-RAY EXAM CHEST 2 VIEWS: CPT | Mod: TC | Performed by: PHYSICIAN ASSISTANT

## 2019-01-01 PROCEDURE — T2045 HOSPICE GENERAL CARE: HCPCS

## 2019-01-01 PROCEDURE — 500881 HCHG PACK, EXTREMITY: Performed by: PLASTIC SURGERY

## 2019-01-01 PROCEDURE — 160009 HCHG ANES TIME/MIN: Performed by: PLASTIC SURGERY

## 2019-01-01 PROCEDURE — 160046 HCHG PACU - 1ST 60 MINS PHASE II: Performed by: PLASTIC SURGERY

## 2019-01-01 PROCEDURE — 160036 HCHG PACU - EA ADDL 30 MINS PHASE I: Performed by: PLASTIC SURGERY

## 2019-01-01 PROCEDURE — G0378 HOSPITAL OBSERVATION PER HR: HCPCS

## 2019-01-01 PROCEDURE — 700101 HCHG RX REV CODE 250: Performed by: ANESTHESIOLOGY

## 2019-01-01 PROCEDURE — 160028 HCHG SURGERY MINUTES - 1ST 30 MINS LEVEL 3: Performed by: PLASTIC SURGERY

## 2019-01-01 PROCEDURE — 700111 HCHG RX REV CODE 636 W/ 250 OVERRIDE (IP): Performed by: HOSPITALIST

## 2019-01-01 PROCEDURE — 85027 COMPLETE CBC AUTOMATED: CPT

## 2019-01-01 PROCEDURE — 700102 HCHG RX REV CODE 250 W/ 637 OVERRIDE(OP): Performed by: ANESTHESIOLOGY

## 2019-01-01 PROCEDURE — A6222 GAUZE <=16 IN NO W/SAL W/O B: HCPCS | Performed by: PLASTIC SURGERY

## 2019-01-01 PROCEDURE — 500423 HCHG DRESSING, ABD COMBINE: Performed by: PLASTIC SURGERY

## 2019-01-01 PROCEDURE — 93005 ELECTROCARDIOGRAM TRACING: CPT | Performed by: EMERGENCY MEDICINE

## 2019-01-01 PROCEDURE — 99214 OFFICE O/P EST MOD 30 MIN: CPT | Performed by: PHYSICIAN ASSISTANT

## 2019-01-01 PROCEDURE — 71046 X-RAY EXAM CHEST 2 VIEWS: CPT

## 2019-01-01 PROCEDURE — 160048 HCHG OR STATISTICAL LEVEL 1-5: Performed by: PLASTIC SURGERY

## 2019-01-01 RX ORDER — POLYVINYL ALCOHOL 14 MG/ML
2 SOLUTION/ DROPS OPHTHALMIC EVERY 6 HOURS PRN
Status: DISCONTINUED | OUTPATIENT
Start: 2019-01-01 | End: 2019-01-01 | Stop reason: HOSPADM

## 2019-01-01 RX ORDER — ONDANSETRON 2 MG/ML
INJECTION INTRAMUSCULAR; INTRAVENOUS PRN
Status: DISCONTINUED | OUTPATIENT
Start: 2019-01-01 | End: 2019-01-01 | Stop reason: SURG

## 2019-01-01 RX ORDER — CARVEDILOL 12.5 MG/1
12.5 TABLET ORAL 2 TIMES DAILY WITH MEALS
Qty: 60 TAB | Refills: 11 | Status: SHIPPED | OUTPATIENT
Start: 2019-01-01

## 2019-01-01 RX ORDER — DEXTROSE MONOHYDRATE 50 MG/ML
INJECTION, SOLUTION INTRAVENOUS CONTINUOUS
Status: DISCONTINUED | OUTPATIENT
Start: 2019-01-01 | End: 2019-01-01

## 2019-01-01 RX ORDER — ONDANSETRON 2 MG/ML
8 INJECTION INTRAMUSCULAR; INTRAVENOUS EVERY 8 HOURS PRN
Status: DISCONTINUED | OUTPATIENT
Start: 2019-01-01 | End: 2019-01-01 | Stop reason: HOSPADM

## 2019-01-01 RX ORDER — LEVETIRACETAM 500 MG/1
TABLET ORAL
Qty: 270 TAB | Refills: 3 | Status: SHIPPED | OUTPATIENT
Start: 2019-01-01 | End: 2019-01-01

## 2019-01-01 RX ORDER — ACETAMINOPHEN 650 MG/1
650 SUPPOSITORY RECTAL EVERY 4 HOURS PRN
Status: DISCONTINUED | OUTPATIENT
Start: 2019-01-01 | End: 2019-01-01 | Stop reason: HOSPADM

## 2019-01-01 RX ORDER — LABETALOL HYDROCHLORIDE 5 MG/ML
5 INJECTION, SOLUTION INTRAVENOUS
Status: DISCONTINUED | OUTPATIENT
Start: 2019-01-01 | End: 2019-01-01 | Stop reason: HOSPADM

## 2019-01-01 RX ORDER — SODIUM CHLORIDE, SODIUM LACTATE, POTASSIUM CHLORIDE, CALCIUM CHLORIDE 600; 310; 30; 20 MG/100ML; MG/100ML; MG/100ML; MG/100ML
INJECTION, SOLUTION INTRAVENOUS CONTINUOUS
Status: DISCONTINUED | OUTPATIENT
Start: 2019-01-01 | End: 2019-01-01 | Stop reason: HOSPADM

## 2019-01-01 RX ORDER — HYDROMORPHONE HYDROCHLORIDE 1 MG/ML
1 INJECTION, SOLUTION INTRAMUSCULAR; INTRAVENOUS; SUBCUTANEOUS ONCE
Status: COMPLETED | OUTPATIENT
Start: 2019-01-01 | End: 2019-01-01

## 2019-01-01 RX ORDER — ATROPINE SULFATE 10 MG/ML
2 SOLUTION/ DROPS OPHTHALMIC EVERY 4 HOURS PRN
Status: DISCONTINUED | OUTPATIENT
Start: 2019-01-01 | End: 2019-01-01 | Stop reason: HOSPADM

## 2019-01-01 RX ORDER — CLINDAMYCIN HYDROCHLORIDE 300 MG/1
300 CAPSULE ORAL 3 TIMES DAILY
Qty: 6 CAP | Refills: 0 | Status: SHIPPED | OUTPATIENT
Start: 2019-01-01 | End: 2019-01-01

## 2019-01-01 RX ORDER — ONDANSETRON 4 MG/1
8 TABLET, ORALLY DISINTEGRATING ORAL EVERY 8 HOURS PRN
Status: DISCONTINUED | OUTPATIENT
Start: 2019-01-01 | End: 2019-01-01 | Stop reason: HOSPADM

## 2019-01-01 RX ORDER — CALCIUM CHLORIDE 100 MG/ML
1 INJECTION INTRAVENOUS; INTRAVENTRICULAR ONCE
Status: COMPLETED | OUTPATIENT
Start: 2019-01-01 | End: 2019-01-01

## 2019-01-01 RX ORDER — ACETAMINOPHEN 500 MG
1000 TABLET ORAL ONCE
Status: COMPLETED | OUTPATIENT
Start: 2019-01-01 | End: 2019-01-01

## 2019-01-01 RX ORDER — HALOPERIDOL 5 MG/ML
1 INJECTION INTRAMUSCULAR
Status: DISCONTINUED | OUTPATIENT
Start: 2019-01-01 | End: 2019-01-01 | Stop reason: HOSPADM

## 2019-01-01 RX ORDER — OXYCODONE HCL 5 MG/5 ML
10 SOLUTION, ORAL ORAL
Status: DISCONTINUED | OUTPATIENT
Start: 2019-01-01 | End: 2019-01-01 | Stop reason: HOSPADM

## 2019-01-01 RX ORDER — LORAZEPAM 2 MG/ML
1 CONCENTRATE ORAL
Status: DISCONTINUED | OUTPATIENT
Start: 2019-01-01 | End: 2019-01-01 | Stop reason: HOSPADM

## 2019-01-01 RX ORDER — LORAZEPAM 2 MG/ML
1 INJECTION INTRAMUSCULAR
Status: DISCONTINUED | OUTPATIENT
Start: 2019-01-01 | End: 2019-01-01 | Stop reason: HOSPADM

## 2019-01-01 RX ORDER — HYDROMORPHONE HYDROCHLORIDE 2 MG/ML
INJECTION, SOLUTION INTRAMUSCULAR; INTRAVENOUS; SUBCUTANEOUS
Status: COMPLETED
Start: 2019-01-01 | End: 2019-01-01

## 2019-01-01 RX ORDER — OXYCODONE HCL 5 MG/5 ML
5 SOLUTION, ORAL ORAL
Status: DISCONTINUED | OUTPATIENT
Start: 2019-01-01 | End: 2019-01-01 | Stop reason: HOSPADM

## 2019-01-01 RX ORDER — BUPIVACAINE HYDROCHLORIDE 2.5 MG/ML
INJECTION, SOLUTION EPIDURAL; INFILTRATION; INTRACAUDAL
Status: DISCONTINUED | OUTPATIENT
Start: 2019-01-01 | End: 2019-01-01 | Stop reason: HOSPADM

## 2019-01-01 RX ORDER — ACETAMINOPHEN 325 MG/1
650 TABLET ORAL EVERY 4 HOURS PRN
Status: DISCONTINUED | OUTPATIENT
Start: 2019-01-01 | End: 2019-01-01 | Stop reason: HOSPADM

## 2019-01-01 RX ORDER — MORPHINE SULFATE 10 MG/ML
5 INJECTION, SOLUTION INTRAMUSCULAR; INTRAVENOUS
Status: DISCONTINUED | OUTPATIENT
Start: 2019-01-01 | End: 2019-01-01 | Stop reason: HOSPADM

## 2019-01-01 RX ORDER — OXYCODONE HYDROCHLORIDE 5 MG/1
5 TABLET ORAL EVERY 4 HOURS PRN
Qty: 20 TAB | Refills: 0 | Status: SHIPPED | OUTPATIENT
Start: 2019-01-01 | End: 2019-01-01

## 2019-01-01 RX ORDER — MORPHINE SULFATE 10 MG/ML
10 INJECTION, SOLUTION INTRAMUSCULAR; INTRAVENOUS
Status: DISCONTINUED | OUTPATIENT
Start: 2019-01-01 | End: 2019-01-01 | Stop reason: HOSPADM

## 2019-01-01 RX ORDER — CEFAZOLIN SODIUM 1 G/3ML
INJECTION, POWDER, FOR SOLUTION INTRAMUSCULAR; INTRAVENOUS PRN
Status: DISCONTINUED | OUTPATIENT
Start: 2019-01-01 | End: 2019-01-01 | Stop reason: SURG

## 2019-01-01 RX ORDER — AZITHROMYCIN 250 MG/1
TABLET, FILM COATED ORAL
Qty: 6 TAB | Refills: 0 | Status: SHIPPED | OUTPATIENT
Start: 2019-01-01 | End: 2019-01-01

## 2019-01-01 RX ORDER — DEXAMETHASONE SODIUM PHOSPHATE 4 MG/ML
INJECTION, SOLUTION INTRA-ARTICULAR; INTRALESIONAL; INTRAMUSCULAR; INTRAVENOUS; SOFT TISSUE PRN
Status: DISCONTINUED | OUTPATIENT
Start: 2019-01-01 | End: 2019-01-01 | Stop reason: SURG

## 2019-01-01 RX ORDER — LISINOPRIL 10 MG/1
TABLET ORAL
Qty: 30 TAB | Refills: 11 | Status: SHIPPED | OUTPATIENT
Start: 2019-01-01

## 2019-01-01 RX ORDER — AMOXICILLIN AND CLAVULANATE POTASSIUM 875; 125 MG/1; MG/1
1 TABLET, FILM COATED ORAL 2 TIMES DAILY
Qty: 20 TAB | Refills: 0 | Status: SHIPPED | OUTPATIENT
Start: 2019-01-01 | End: 2019-01-01

## 2019-01-01 RX ADMIN — ACETAMINOPHEN 1000 MG: 500 TABLET ORAL at 13:51

## 2019-01-01 RX ADMIN — ONDANSETRON 4 MG: 2 INJECTION INTRAMUSCULAR; INTRAVENOUS at 14:33

## 2019-01-01 RX ADMIN — DEXAMETHASONE SODIUM PHOSPHATE 8 MG: 4 INJECTION, SOLUTION INTRA-ARTICULAR; INTRALESIONAL; INTRAMUSCULAR; INTRAVENOUS; SOFT TISSUE at 14:32

## 2019-01-01 RX ADMIN — PROPOFOL 120 MG: 10 INJECTION, EMULSION INTRAVENOUS at 14:21

## 2019-01-01 RX ADMIN — EPHEDRINE SULFATE 15 MG: 50 INJECTION INTRAMUSCULAR; INTRAVENOUS; SUBCUTANEOUS at 14:29

## 2019-01-01 RX ADMIN — FENTANYL CITRATE 100 MCG: 50 INJECTION, SOLUTION INTRAMUSCULAR; INTRAVENOUS at 14:21

## 2019-01-01 RX ADMIN — CALCIUM CHLORIDE 1 G: 100 INJECTION INTRAVENOUS; INTRAVENTRICULAR at 16:42

## 2019-01-01 RX ADMIN — AMIODARONE HYDROCHLORIDE 150 MG: 1.5 INJECTION, SOLUTION INTRAVENOUS at 16:55

## 2019-01-01 RX ADMIN — EPHEDRINE SULFATE 15 MG: 50 INJECTION INTRAMUSCULAR; INTRAVENOUS; SUBCUTANEOUS at 14:27

## 2019-01-01 RX ADMIN — MORPHINE SULFATE 10 MG: 10 INJECTION INTRAVENOUS at 18:19

## 2019-01-01 RX ADMIN — HYDROMORPHONE HYDROCHLORIDE 1 MG: 1 INJECTION, SOLUTION INTRAMUSCULAR; INTRAVENOUS; SUBCUTANEOUS at 16:53

## 2019-01-01 RX ADMIN — LORAZEPAM 1 MG: 2 INJECTION INTRAMUSCULAR; INTRAVENOUS at 18:20

## 2019-01-01 RX ADMIN — CEFAZOLIN 2 G: 330 INJECTION, POWDER, FOR SOLUTION INTRAMUSCULAR; INTRAVENOUS at 14:15

## 2019-01-01 RX ADMIN — DEXTROSE MONOHYDRATE 250 ML: 100 INJECTION, SOLUTION INTRAVENOUS at 16:57

## 2019-01-01 RX ADMIN — SODIUM CHLORIDE, POTASSIUM CHLORIDE, SODIUM LACTATE AND CALCIUM CHLORIDE: 600; 310; 30; 20 INJECTION, SOLUTION INTRAVENOUS at 13:51

## 2019-01-01 RX ADMIN — HYDROMORPHONE HYDROCHLORIDE 1 MG: 2 INJECTION INTRAMUSCULAR; INTRAVENOUS; SUBCUTANEOUS at 16:55

## 2019-01-01 SDOH — ECONOMIC STABILITY: HOUSING INSECURITY: EVIDENCE OF SMOKING MATERIAL: 0

## 2019-01-01 SDOH — ECONOMIC STABILITY: GENERAL

## 2019-01-01 ASSESSMENT — ENCOUNTER SYMPTOMS
EYE DISCHARGE: 0
DIZZINESS: 0
SORE THROAT: 1
DYSPNEA ON EXERTION: 1
SPUTUM PRODUCTION: 1
PALPITATIONS: 0
SHORTNESS OF BREATH: 1
LAST BOWEL MOVEMENT: 65287
LAST BOWEL MOVEMENT: 65296
DYSPNEA ON EXERTION: 1
DYSPNEA ON EXERTION: 1
ABDOMINAL PAIN: 0
NAUSEA: 0
FATIGUES EASILY: 1
SHORTNESS OF BREATH: 1
DYSPNEA ACTIVITY LEVEL: AT REST
MYALGIAS: 0
FATIGUES EASILY: 1
CONSTIPATION: 0
DIZZINESS: 1
BOWEL PATTERN NORMAL: 1
DIZZINESS: 1
FATIGUES EASILY: 1
SHORTNESS OF BREATH: 1
BOWEL PATTERN NORMAL: 1
AGITATION: 1
DYSPNEA ACTIVITY LEVEL: AT REST
SLEEP QUALITY: FAIR
BOWEL PATTERN NORMAL: 1
COUGH: 1
EYE PAIN: 0
DYSPNEA ON EXERTION: 1
FEVER: 0
SLEEP QUALITY: POOR
DIZZINESS: 1
VOMITING: 0
SLEEP QUALITY: POOR
ABDOMINAL PAIN: 0
STOOL FREQUENCY: DAILY
DYSPNEA ON EXERTION: 1
DYSPNEA ACTIVITY LEVEL: AT REST
SHORTNESS OF BREATH: 0
CLAUDICATION: 0
CHEST PAIN QUALITY: ACHING
SINUS PAIN: 0
SLEEP QUALITY: POOR
HYPERTENSION: 1
CHILLS: 0
HEADACHES: 0
LAST BOWEL MOVEMENT: 65293
DIARRHEA: 0
COUGH: 1
MYALGIAS: 0
SLEEP QUALITY: FAIR
FEVER: 1
SPUTUM PRODUCTION: 1
HYPERTENSION: 1
DYSPNEA ON EXERTION: 1
BOWEL PATTERN NORMAL: 1
LAST BOWEL MOVEMENT: 65296
HYPERTENSION: 1
PND: 0
ORTHOPNEA: 0
SHORTNESS OF BREATH: 1
SLEEP QUALITY: FAIR

## 2019-01-01 ASSESSMENT — ACTIVITIES OF DAILY LIVING (ADL)
MONEY MANAGEMENT (EXPENSES/BILLS): INDEPENDENT
MONEY MANAGEMENT (EXPENSES/BILLS): INDEPENDENT

## 2019-01-01 ASSESSMENT — SOCIAL DETERMINANTS OF HEALTH (SDOH)
FINANCIAL_CONCERNS: 1
ACTIVE STRESSOR - STRAINED FAMILY RELATIONSHIP: 1
FINANCIAL_CONCERNS: 1
ACTIVE STRESSOR - NO STRESS FACTORS: 1
ACTIVE STRESSOR - HEALTH CHANGES: 1
ACTIVE STRESSOR - NO STRESS FACTORS: 1
ACTIVE STRESSOR - NO STRESS FACTORS: 1

## 2019-01-01 ASSESSMENT — LIFESTYLE VARIABLES
DO YOU DRINK ALCOHOL: NO
DO YOU DRINK ALCOHOL: NO

## 2019-01-01 ASSESSMENT — PATIENT HEALTH QUESTIONNAIRE - PHQ9: CLINICAL INTERPRETATION OF PHQ2 SCORE: 0

## 2019-01-01 ASSESSMENT — PAIN SCALES - GENERAL: PAIN_LEVEL: 0

## 2019-01-28 NOTE — TELEPHONE ENCOUNTER
Called patients [facility] pharmacy gave verbal to Una to fill   levETIRAcetam (KEPPRA) 500 MG Tab 90 Tab 11/11 3/29/2018     Si tab po bid, and also on HD day, take an extra tab right after dialysis.      Pharmacist hedy understood.

## 2019-02-26 NOTE — PROGRESS NOTES
Subjective:   Otoniel Langston is a 28 y.o. male who presents for Cough (x1 day, hard to breathe, dry cough)       Cough   This is a new problem. The current episode started yesterday. The problem has been gradually worsening. The problem occurs every few minutes. The cough is productive of sputum. Associated symptoms include a fever, nasal congestion and a sore throat. Pertinent negatives include no chest pain, chills, ear congestion, ear pain, headaches, myalgias, rash or shortness of breath. Nothing aggravates the symptoms. Treatments tried: tylenol. The treatment provided mild relief.     Review of Systems   Constitutional: Positive for fever. Negative for chills.        Fever 99.5F   HENT: Positive for congestion and sore throat. Negative for ear discharge, ear pain and sinus pain.    Eyes: Negative for pain and discharge.   Respiratory: Positive for cough and sputum production. Negative for shortness of breath.    Cardiovascular: Negative for chest pain.   Gastrointestinal: Negative for abdominal pain, constipation, diarrhea, nausea and vomiting.   Musculoskeletal: Negative for myalgias.   Skin: Negative for rash.   Neurological: Negative for headaches.   All other systems reviewed and are negative.      PMH:  has a past medical history of Anemia; Breath shortness; Changing skin lesion (8/17/2009); Chest pain (11/18/2014); CHF (congestive heart failure) ([facility]) (1/27/2014); Encounter for renal dialysis; ESRF (end stage renal failure) ([facility]); HF (heart failure), systolic ([facility]); History of anemia; Hypertension; and Pneumonia (8/2015). He also has no past medical history of Blood transfusion or Headache(784.0).    MEDS:   Current Outpatient Prescriptions:   •  levETIRAcetam (KEPPRA) 500 MG Tab, TAKE ONE TABLET BY MOUTH TWICE DAILY AND ON DIALYSIS DAY TAKE AN EXTRA TABLET RIGHT AFTER DIALYSIS, Disp: 270 Tab, Rfl: 3  •  calcium acetate (PHOS-LO) 667 MG Cap, Take 1,334 mg by mouth 3 times a day, with meals.,  Disp: , Rfl:   •  isotretinoin (MYORISAN) 20 MG capsule, Take 40 mg by mouth See Admin Instructions. 40 mg twice daily on Tue, Thurs, Sat. 40 mg once daily on Sun, Mon, Wed, Fri, Disp: , Rfl:   •  carvedilol (COREG) 6.25 MG Tab, Take 1 Tab by mouth 2 times a day, with meals., Disp: 180 Tab, Rfl: 3  •  lisinopril (PRINIVIL) 5 MG Tab, TAKE ONE TABLET BY MOUTH ONCE DAILY, Disp: 90 Tab, Rfl: 3  •  levETIRAcetam (KEPPRA) 500 MG Tab, 1 tab po bid, and also on HD day, take an extra tab right after dialysis., Disp: 90 Tab, Rfl: 11    ALLERGIES:   Allergies   Allergen Reactions   • Vancomycin Itching     Pt states itching.       SURGHX:   Past Surgical History:   Procedure Laterality Date   • AV FISTULA REVISION Left 12/10/2018    Procedure: AV FISTULA REVISION - UPPER EXTREMITY AV FISTULA REDUCTION DUE TO SKIN DEGENERATION WITH LIGATION;  Surgeon: Anselmo Felix M.D.;  Location: SURGERY [facility];  Service: General   • AV FISTULA CREATION Right 05/2018   • PARATHYROID EXPLORATION  8/10/2016    Procedure: PARATHYROID EXPLORATION with BILATERAL NIMS NERVE monitoring and cervical thymectomy ;  Surgeon: Topher Ramirez M.D.;  Location: SURGERY SAME DAY [facility];  Service:    • AV FISTULA CREATION  12/22/2014    Performed by Anselmo Felix M.D. at SURGERY [facility]   • AV FISTULA REVISION  10/28/2013    Performed by Anselmo Felix M.D. at [facility]   • APPENDECTOMY LAPAROSCOPIC  11/10/2009    Performed by VLADISLAV PALACIO at SURGERY [facility]   • AV FISTULA REVISION  10/13/2009    Performed by ANSELMO FELIX at [facility]   • CATH PLACEMENT  10/13/2009    Performed by ANSELMO FELIX at [facility]   • AV FISTULA CREATION  2005    left arm       SOCHX:  reports that he quit smoking about 2 years ago. His smoking use included Cigarettes. He has a 2.50 pack-year smoking history. He has never used smokeless tobacco. He reports that he drinks alcohol. He reports that  "he does not use drugs.    FH: Reviewed with patient, not pertinent to this visit.     Objective:   /90   Pulse 100   Temp 36.6 °C (97.8 °F)   Resp 16   Ht 1.702 m (5' 7\")   Wt 63.5 kg (140 lb)   SpO2 96%   BMI 21.93 kg/m²   Physical Exam   Constitutional: He is oriented to person, place, and time. He appears well-developed and well-nourished. No distress.   HENT:   Head: Normocephalic and atraumatic.   Right Ear: Tympanic membrane, external ear and ear canal normal.   Left Ear: Tympanic membrane, external ear and ear canal normal.   Nose: Mucosal edema present.   Mouth/Throat: Uvula is midline and mucous membranes are normal. Posterior oropharyngeal edema and posterior oropharyngeal erythema present. No oropharyngeal exudate.   Eyes: Pupils are equal, round, and reactive to light. Conjunctivae and EOM are normal.   Neck: Normal range of motion. Neck supple. No tracheal deviation present.   Cardiovascular: Normal rate and regular rhythm.    Murmur heard.  Pulmonary/Chest: Effort normal and breath sounds normal. No respiratory distress. He has no wheezes. He has no rhonchi. He has no rales.   Musculoskeletal:   ROM normal all four extremities   Lymphadenopathy:     He has cervical adenopathy.   Neurological: He is alert and oriented to person, place, and time.   Skin: Skin is warm and dry.   Psychiatric: He has a normal mood and affect. His behavior is normal. Judgment and thought content normal.   Vitals reviewed.    - POCT Rapid Strep A: negative  - DX-CHEST-2 VIEWS   Impression: No acute cardiopulmonary abnormality.    Assessment/Plan:   1. Viral upper respiratory tract infection  - POCT Rapid Strep A  - DX-CHEST-2 VIEWS; Future    - Advised to continue OTC tylenol, try warm fluids, saline gargles, mucinex for symptom relief  - Advised to return if symptoms worsen or do not improve    Differential diagnosis, natural history, supportive care, and indications for immediate follow-up discussed.  "

## 2019-03-11 NOTE — PROGRESS NOTES
HPI: Otoniel Langston is a 28 y.o. male who presents with   Chief Complaint   Patient presents with   • URI     x 3 weeks and not feeling better      Patient presents to urgent care for acute onset of a continued problem.  He has had 3 weeks of respiratory symptoms cough nasal congestion sore throat denies any fevers or chills but has had malaise and fatigue.    He has a history of hypertension congestive heart failure heart murmur these issues have been stable more recently on medication upon chart review his blood pressure has been running slightly high he thinks that he may be taking over-the-counter medications with decongestants in them denies any chest pain or shortness of breath.       Worsened by: activity, laying supine at night, first thing in the morning, when exposed to outside allergens  Improved by: OTC symptomatic medictions    Review of Systems performed. All other systems are negative except for what is listed above.     PMH:  has a past medical history of Anemia; Breath shortness; Changing skin lesion (8/17/2009); Chest pain (11/18/2014); CHF (congestive heart failure) ([facility]) (1/27/2014); Encounter for renal dialysis; ESRF (end stage renal failure) ([facility]); HF (heart failure), systolic ([facility]); History of anemia; Hypertension; and Pneumonia (8/2015). He also has no past medical history of Blood transfusion or Headache(784.0).  MEDS:   Current Outpatient Prescriptions:   •  levETIRAcetam (KEPPRA) 500 MG Tab, TAKE ONE TABLET BY MOUTH TWICE DAILY AND ON DIALYSIS DAY TAKE AN EXTRA TABLET RIGHT AFTER DIALYSIS, Disp: 270 Tab, Rfl: 3  •  carvedilol (COREG) 6.25 MG Tab, Take 1 Tab by mouth 2 times a day, with meals., Disp: 180 Tab, Rfl: 3  •  lisinopril (PRINIVIL) 5 MG Tab, TAKE ONE TABLET BY MOUTH ONCE DAILY, Disp: 90 Tab, Rfl: 3  •  levETIRAcetam (KEPPRA) 500 MG Tab, 1 tab po bid, and also on HD day, take an extra tab right after dialysis., Disp: 90 Tab, Rfl: 11  •  calcium acetate (PHOS-LO) 667 MG  "Cap, Take 1,334 mg by mouth 3 times a day, with meals., Disp: , Rfl:   •  isotretinoin (MYORISAN) 20 MG capsule, Take 40 mg by mouth See Admin Instructions. 40 mg twice daily on Tue, Thurs, Sat. 40 mg once daily on Sun, Mon, Wed, Fri, Disp: , Rfl:   ALLERGIES:   Allergies   Allergen Reactions   • Vancomycin Itching     Pt states itching.     SURGHX:   Past Surgical History:   Procedure Laterality Date   • AV FISTULA REVISION Left 12/10/2018    Procedure: AV FISTULA REVISION - UPPER EXTREMITY AV FISTULA REDUCTION DUE TO SKIN DEGENERATION WITH LIGATION;  Surgeon: Anselmo Felix M.D.;  Location: SURGERY [facility];  Service: General   • AV FISTULA CREATION Right 05/2018   • PARATHYROID EXPLORATION  8/10/2016    Procedure: PARATHYROID EXPLORATION with BILATERAL NIMS NERVE monitoring and cervical thymectomy ;  Surgeon: Topher Ramirez M.D.;  Location: SURGERY SAME DAY [facility];  Service:    • AV FISTULA CREATION  12/22/2014    Performed by Anselmo Felix M.D. at SURGERY [facility]   • AV FISTULA REVISION  10/28/2013    Performed by Anselmo Felix M.D. at [facility]   • APPENDECTOMY LAPAROSCOPIC  11/10/2009    Performed by VLADISLAV PALACIO at SURGERY [facility]   • AV FISTULA REVISION  10/13/2009    Performed by ANSELMO FELIX at SURGERY [facility]   • CATH PLACEMENT  10/13/2009    Performed by ANSELMO FELIX at SURGERY [facility]   • AV FISTULA CREATION  2005    left arm     SOCHX:  reports that he quit smoking about 2 years ago. His smoking use included Cigarettes. He has a 2.50 pack-year smoking history. He has never used smokeless tobacco. He reports that he drinks alcohol. He reports that he does not use drugs.  FH: Family history was reviewed, no pertinent findings to report    PE:  Vitals BP (!) 168/88 (BP Location: Left arm, Patient Position: Sitting, BP Cuff Size: Adult)   Pulse (!) 102   Temp 36.3 °C (97.4 °F) (Temporal)   Resp 16   Ht 1.702 m (5' 7\")   Wt 63.5 " kg (140 lb)   SpO2 99%   BMI 21.93 kg/m²    Gen AOx4, NAD  HEENT: moist mucus membranes, no pain or pressure with percussion of frontal, maxillary or ethmoid sinuses.  Bilateral conjunciva clear without erythema or exudate,  Bilateral TM's clear without bulge, fluid or loss of landmarks, moderate pharyngeal erythema without tonsillar exudate but with bilateral tonsillar enlargement  Neck: supple, no cervical lymphadenopathy, no signs of menigismus  CV/PULM: RRR 3/6 murmur noted no rales ronchi or wheezes, no signs of resp distress  Abd soft nontender, bs present  Skin no rashes  Extremities -c/c/e  Neuro appropriate affect,     A/P  1. Acute URI  amoxicillin-clavulanate (AUGMENTIN) 875-125 MG Tab   2. Essential hypertension         Chronic condition of hypertension that may potentially impact the patient's ability to recover from this condition appear slightly high. The patient will f/u with their pcp and continue with current chronic medications as directed.  Differential diagnosis, natural history, supportive care discussed. Follow-up with primary care provider within 7-10 days, emergency room precautions discussed.  Patient and/or family appears understanding of information.

## 2019-03-12 NOTE — TELEPHONE ENCOUNTER
"issues with high blood pressure   Received: Today   Message Contents   Marjangregg Lau R.N.             TT/Ginny       Patient was recently sick with upper respiratory infection, he's now having issues with high blood pressure. He can be reached at [phone].      Returned patient call. Pts states URI 2.5 weeks ago. Pt states his BP has been high ever since. Pt states he \"has no numbers\"-\"Always beeping HIGH on the dialysis machine\". Attempted to schedule pt to see MD this afternoon or tomorrow AM. Pt states it wont work with his schedule. Pt scheduled to see APRN today at 12:40.     APRN advised.  "

## 2019-03-12 NOTE — PROGRESS NOTES
Chief Complaint   Patient presents with   • Congestive Heart Failure       Subjective:   Otoniel Langston is a 28 y.o. male who presents today for follow-up on elevated blood pressure and fast heart rate.    Patient of Dr. Godinez.  Patient was last seen in clinic on 8/29/2018.    Patient reports over the past couple of weeks, patient has had an upper respiratory infection.  Patient did see PCP yesterday, and was prescribed an antibiotic.  Patient was recommended to follow-up with us for his hypertension.    Patient reports he has been noticing higher blood pressures and tachycardia with the upper respiratory infection.    Patient reports he did take some Mucinex for couple of days but stopped 2 days ago.    Patient also reports he did take increased doses of carvedilol and lisinopril today for high blood pressure.  He took 9.375 mg of carvedilol and 7.5 mg of lisinopril this morning and had dialysis.  His BP remains high.    For his symptoms, he does report some congestion, cough with yellow sputum, shortness of breath.  He denies any chest pain, palpitations, orthopnea, PND or dizziness or lightheadedness.  He does sleep elevated due to cough.    Additonally, patient has the following medical problems:     -ESRD-Dialysis T/Th/Sat     -HTN    Past Medical History:   Diagnosis Date   • Anemia    • Breath shortness     with chf exacerbation/fluid overload   • Changing skin lesion 8/17/2009   • Chest pain 11/18/2014   • CHF (congestive heart failure) ([facility]) 1/27/2014   • Encounter for renal dialysis     Tues, Thurs, Sat   • ESRF (end stage renal failure) ([facility])    • HF (heart failure), systolic ([facility])    • History of anemia     due to renal function   • Hypertension    • Pneumonia 8/2015     Past Surgical History:   Procedure Laterality Date   • AV FISTULA REVISION Left 12/10/2018    Procedure: AV FISTULA REVISION - UPPER EXTREMITY AV FISTULA REDUCTION DUE TO SKIN DEGENERATION WITH LIGATION;  Surgeon: Chris Felix  M.D.;  Location: SURGERY [facility];  Service: General   • AV FISTULA CREATION Right 05/2018   • PARATHYROID EXPLORATION  8/10/2016    Procedure: PARATHYROID EXPLORATION with BILATERAL NIMS NERVE monitoring and cervical thymectomy ;  Surgeon: Tophre Ramirez M.D.;  Location: SURGERY SAME DAY [facility];  Service:    • AV FISTULA CREATION  12/22/2014    Performed by Anselmo Felix M.D. at SURGERY [facility]   • AV FISTULA REVISION  10/28/2013    Performed by Anselmo Felix M.D. at SURGERY [facility]   • APPENDECTOMY LAPAROSCOPIC  11/10/2009    Performed by VLADISLAV PALACIO at SURGERY [facility]   • AV FISTULA REVISION  10/13/2009    Performed by ANSELMO FELIX at SURGERY [facility]   • CATH PLACEMENT  10/13/2009    Performed by ANSELMO FELIX at SURGERY [facility]   • AV FISTULA CREATION  2005    left arm     Family History   Problem Relation Age of Onset   • Depression Mother    • No Known Problems Sister    • No Known Problems Sister    • No Known Problems Sister      Social History     Social History   • Marital status: Single     Spouse name: N/A   • Number of children: N/A   • Years of education: N/A     Occupational History   • Not on file.     Social History Main Topics   • Smoking status: Former Smoker     Packs/day: 0.50     Years: 5.00     Types: Cigarettes     Quit date: 11/28/2016   • Smokeless tobacco: Never Used   • Alcohol use Yes      Comment: 2-3/per month   • Drug use: No   • Sexual activity: No     Other Topics Concern   • Not on file     Social History Narrative   • No narrative on file     Allergies   Allergen Reactions   • Vancomycin Itching     Pt states itching.     Outpatient Encounter Prescriptions as of 3/12/2019   Medication Sig Dispense Refill   • carvedilol (COREG) 12.5 MG Tab Take 1 Tab by mouth 2 times a day, with meals. 60 Tab 11   • lisinopril (PRINIVIL) 10 MG Tab TAKE ONE TABLET BY MOUTH ONCE DAILY 30 Tab 11   • amoxicillin-clavulanate  "(AUGMENTIN) 875-125 MG Tab Take 1 Tab by mouth 2 times a day. With food 20 Tab 0   • calcium acetate (PHOS-LO) 667 MG Cap Take 1,334 mg by mouth 3 times a day, with meals.     • isotretinoin (MYORISAN) 20 MG capsule Take 40 mg by mouth See Admin Instructions. 40 mg twice daily on Tue, Thurs, Sat. 40 mg once daily on Sun, Mon, Wed, Fri     • levETIRAcetam (KEPPRA) 500 MG Tab 1 tab po bid, and also on HD day, take an extra tab right after dialysis. 90 Tab 11   • [DISCONTINUED] levETIRAcetam (KEPPRA) 500 MG Tab TAKE ONE TABLET BY MOUTH TWICE DAILY AND ON DIALYSIS DAY TAKE AN EXTRA TABLET RIGHT AFTER DIALYSIS (Patient not taking: Reported on 3/12/2019) 270 Tab 3   • [DISCONTINUED] carvedilol (COREG) 6.25 MG Tab Take 1 Tab by mouth 2 times a day, with meals. 180 Tab 3   • [DISCONTINUED] lisinopril (PRINIVIL) 5 MG Tab TAKE ONE TABLET BY MOUTH ONCE DAILY 90 Tab 3     No facility-administered encounter medications on file as of 3/12/2019.      Review of Systems   Constitutional: Negative for fever and malaise/fatigue.   HENT: Positive for congestion.    Respiratory: Positive for cough, sputum production (yellow) and shortness of breath.    Cardiovascular: Negative for chest pain, palpitations, orthopnea, claudication, leg swelling and PND.   Gastrointestinal: Negative for abdominal pain.   Musculoskeletal: Negative for myalgias.   Neurological: Negative for dizziness.   All other systems reviewed and are negative.       Objective:   BP (!) 174/128 (BP Location: Left arm, Patient Position: Sitting, BP Cuff Size: Adult)   Pulse (!) 111   Ht 1.702 m (5' 7\")   Wt 63.4 kg (139 lb 12.8 oz)   SpO2 96%   BMI 21.90 kg/m²      Physical Exam   Constitutional: He is oriented to person, place, and time. He appears well-developed and well-nourished.   HENT:   Head: Normocephalic and atraumatic.   Eyes: Pupils are equal, round, and reactive to light. EOM are normal.   Neck: Normal range of motion. Neck supple. No JVD present. "   Cardiovascular: Normal rate, regular rhythm and normal heart sounds.    Pulmonary/Chest: Effort normal and breath sounds normal. No respiratory distress. He has no wheezes. He has no rales.   Abdominal: Soft. Bowel sounds are normal.   Musculoskeletal: He exhibits no edema.   Neurological: He is alert and oriented to person, place, and time.   Skin: Skin is warm and dry.   Left upper arm fistula-no bruit or thrill, right forearm AV fistula with positive bruit and thrill   Psychiatric: He has a normal mood and affect. His behavior is normal.   Vitals reviewed.    Lab Results   Component Value Date/Time    CHOLSTRLTOT 156 09/05/2013 02:42 PM     (H) 09/05/2013 02:42 PM    HDL 34 (A) 09/05/2013 02:42 PM    TRIGLYCERIDE 48 09/05/2013 02:42 PM       Lab Results   Component Value Date/Time    SODIUM 142 12/07/2018 09:05 AM    POTASSIUM 4.6 12/10/2018 10:26 AM    CHLORIDE 103 12/07/2018 09:05 AM    CO2 21 12/07/2018 09:05 AM    GLUCOSE 84 12/07/2018 09:05 AM    BUN 61 (H) 12/07/2018 09:05 AM    CREATININE 11.90 (HH) 12/07/2018 09:05 AM    CREATININE 7.5 (HH) 06/11/2007 11:50 PM     Lab Results   Component Value Date/Time    ALKPHOSPHAT 53 03/27/2018 11:38 AM    ASTSGOT 15 03/27/2018 11:38 AM    ALTSGPT 17 03/27/2018 11:38 AM    TBILIRUBIN 0.5 03/27/2018 11:38 AM      Transthoracic Echo Report 3/2/17  Severely reduced left ventricular systolic function.  Normal left ventricular wall thickness.  Left ventricle is mildly dilated.  Aortic sclerosis without stenosis.  Moderate eccentric tricuspid regurgitation.  Moderate eccentric tricuspid regurgitation.  Right ventricular systolic pressure is estimated to be 35 mmHg.  Compared to the images of the study done 11/19/14 - there has been   worsening of left ventricular ejection fraction.      Transthoracic Echo Report 7/5/2017Limited study.  Left ventricular ejection fraction is visually estimated to be 40%.    Assessment:     1. ACC/AHA stage C systolic heart failure  ([facility])  EKG    carvedilol (COREG) 12.5 MG Tab   2. Heart failure, NYHA class 2 ([facility])  EKG    carvedilol (COREG) 12.5 MG Tab   3. End stage renal disease ([facility])  EKG    carvedilol (COREG) 12.5 MG Tab    Basic Metabolic Panel    MAGNESIUM   4. Dialysis patient ([facility])  carvedilol (COREG) 12.5 MG Tab    Basic Metabolic Panel    MAGNESIUM   5. Dyspnea on exertion  carvedilol (COREG) 12.5 MG Tab   6. Essential hypertension  EKG    lisinopril (PRINIVIL) 10 MG Tab    Basic Metabolic Panel    MAGNESIUM   7. Heart rate fast  EKG    Basic Metabolic Panel    MAGNESIUM       Medical Decision Making:  Today's Assessment / Status / Plan:   1.  Hypertension: BP elevated in clinic, EKG sinus tachycardia 111  -Increase lisinopril to 10 mg daily  -Increase carvedilol to 12.5 mg daily  -Patient continues to have BP monitored at dialysis, encourage patient to follow-up or contact office if BP becomes too low  -Will obtain BMP and magnesium levels, patient reports last labs about 2 weeks ago  -Discussed with patient not to take any over-the-counter medications for his congestion or cold as these may affect his blood pressure  -Discussed with patient his elevated blood pressure could be due to his URI    2. HFrEF, Stage C, Class 1-2, LVEF 40%: Based on physical examination findings, patient is euvolemic. No JVD, lungs are clear to auscultation, no pitting edema in bilateral lower extremities, no ascites.  -Increase carvedilol and lisinopril per above  -Reinforced s/sx of worsening heart failure with patient and weight monitoring. Pt verbalizes understanding. Pt to call office or RTC if present.     3.  End-stage renal disease:  -Continue dialysis    FU in clinic in 2 weeks with labs today or tomorrow. Sooner if needed.    Patient verbalizes understanding and agrees with the plan of care.     Collaborating MD: Nitin Hendrickson MD

## 2019-03-13 NOTE — TELEPHONE ENCOUNTER
[facility] with critical results as below.   APRN advised.        Results for DONOVAN AMBRIZ (MRN [id number]) as of 3/13/2019 16:15   Ref. Range 3/13/2019 12:04   Sodium Latest Ref Range: 135 - 145 mmol/L 139   Potassium Latest Ref Range: 3.6 - 5.5 mmol/L 4.9   Chloride Latest Ref Range: 96 - 112 mmol/L 100   Co2 Latest Ref Range: 20 - 33 mmol/L 22   Anion Gap Latest Ref Range: 0.0 - 11.9  17.0 (H)   Glucose Latest Ref Range: 65 - 99 mg/dL 88   Bun Latest Ref Range: 8 - 22 mg/dL 78 (HH)   Creatinine Latest Ref Range: 0.50 - 1.40 mg/dL 12.11 (HH)   GFR If  Latest Ref Range: >60 mL/min/1.73 m 2 6 (A)   GFR If Non  Latest Ref Range: >60 mL/min/1.73 m 2 5 (A)   Calcium Latest Ref Range: 8.5 - 10.5 mg/dL 8.5   Magnesium Latest Ref Range: 1.5 - 2.5 mg/dL 2.4

## 2019-03-19 NOTE — ED NOTES
Verbalizes understanding of discharge and followup instructions. VSS. Given information for electronically sent Rx. Ambulates with steady gait to discharge.

## 2019-03-19 NOTE — ED TRIAGE NOTES
"Chief Complaint   Patient presents with   • Cold Symptoms     Pt reports symptoms for ~3 weeks. Diagnosed with upper respiratory infection by UC and prescribed abxs. States ~2 doses left and cough returned. Productive with yellow/white sputum.      /110   Pulse 93   Temp 36.8 °C (98.2 °F) (Temporal)   Resp 17   Ht 1.702 m (5' 7\")   Wt 61.8 kg (136 lb 3.9 oz)   SpO2 98%   BMI 21.34 kg/m²     Pt ambulated into triage, complaints as above. VS as above, NAD, encouraged to return to the triage nurse or tech with any new complaints or symptoms.  "

## 2019-03-19 NOTE — DISCHARGE INSTRUCTIONS
Chest x-ray shows symptoms consistent with bronchitis usually of a viral nature however because of your previous history of pneumonias, congestive heart failure, and chronic renal failure will be placed on an antibiotic to help prevent bacterial infection with this.  Use over-the-counter decongestants and cough medicines.  Please follow-up with your primary doctor in 2 days

## 2019-03-19 NOTE — ED PROVIDER NOTES
ED Provider Note  CHIEF COMPLAINT  Chief Complaint   Patient presents with   • Cold Symptoms     Pt reports symptoms for ~3 weeks. Diagnosed with upper respiratory infection by UC and prescribed abxs. States ~2 doses left and cough returned. Productive with yellow/white sputum.        HPI  Otoniel Langston is a 28 y.o. male who presents with complaints of a cough that started last night.  He also complains of shortness of breath sinus congestion and sore throat.  He has had nausea and vomiting last night no vomiting today.  Patient had URI symptoms approximately 3 weeks ago was placed on antibiotics of symptoms with this patient is a renal dialysis patient, with a history of congestive heart failure also.  His last dialysis was today and he went without any complications.    No recent fevers or chills.  No abdominal pain or diarrhea    REVIEW OF SYSTEMS  See HPI for further details. All other systems are negative.     PAST MEDICAL HISTORY   has a past medical history of Anemia; Breath shortness; Changing skin lesion (8/17/2009); Chest pain (11/18/2014); CHF (congestive heart failure) ([facility]) (1/27/2014); Encounter for renal dialysis; ESRF (end stage renal failure) ([facility]); HF (heart failure), systolic ([facility]); History of anemia; Hypertension; and Pneumonia (8/2015).    SOCIAL HISTORY  Social History     Social History Main Topics   • Smoking status: Former Smoker     Packs/day: 0.50     Years: 5.00     Types: Cigarettes     Quit date: 11/28/2016   • Smokeless tobacco: Never Used   • Alcohol use Yes      Comment: 2-3/per month   • Drug use: No   • Sexual activity: No       SURGICAL HISTORY   has a past surgical history that includes av fistula creation (2005); av fistula revision (10/13/2009); cath placement (10/13/2009); appendectomy laparoscopic (11/10/2009); av fistula revision (10/28/2013); av fistula creation (12/22/2014); parathyroid exploration (8/10/2016); av fistula creation (Right, 05/2018); and av  "fistula revision (Left, 12/10/2018).    CURRENT MEDICATIONS  Home Medications     Reviewed by Dianna Mendoza R.N. (Registered Nurse) on 03/19/19 at 1047  Med List Status: Partial   Medication Last Dose Status   amoxicillin-clavulanate (AUGMENTIN) 875-125 MG Tab 3/18/2019 Active   calcium acetate (PHOS-LO) 667 MG Cap  Active   carvedilol (COREG) 12.5 MG Tab  Active   isotretinoin (MYORISAN) 20 MG capsule  Active   levETIRAcetam (KEPPRA) 500 MG Tab  Active   lisinopril (PRINIVIL) 10 MG Tab  Active                ALLERGIES  Allergies   Allergen Reactions   • Vancomycin Itching     Pt states itching.       PHYSICAL EXAM  VITAL SIGNS: BP (!) 167/108   Pulse 100   Temp 36.8 °C (98.2 °F) (Temporal)   Resp 18   Ht 1.702 m (5' 7\")   Wt 61.8 kg (136 lb 3.9 oz)   SpO2 95%   BMI 21.34 kg/m²   Pulse ox interpretation: pulse ox is normal.  Constitutional: Alert in no apparent distress.  HENT: No signs of trauma, Bilateral external ears normal, Nose normal.   Eyes: Pupils are equal and reactive, Conjunctiva normal, Non-icteric.   Neck: Normal range of motion, No tenderness, Supple, No stridor.   Lymphatic: No lymphadenopathy noted.   Cardiovascular: Regular rate and rhythm, no murmurs.   Thorax & Lungs: Normal breath sounds, No respiratory distress, No wheezing, No chest tenderness.   Abdomen: Bowel sounds normal, Soft, No tenderness, No masses, No pulsatile masses. No peritoneal signs.  Skin: Warm, Dry, No erythema, No rash.   Back: No bony tenderness, No CVA tenderness.   Extremities: Intact distal pulses, No edema, No tenderness, No cyanosis, he has a right upper extremity vascular access for his dialysis.   Musculoskeletal: Good range of motion in all major joints. No tenderness to palpation or major deformities noted.   Neurologic: Alert , Normal motor function, Normal sensory function, No focal deficits noted.   Psychiatric: Affect normal, Judgment normal, Mood normal.       MEDICAL DECISION MAKING  Patient has a " history of congestive heart failure, and dialysis.  Crease due to his history, so pneumonia, bronchitis, viral versus bacterial is considered, exacerbation of congestive heart failure is also considered.  Chest x-ray be done to evaluate.  His pulse oximetry is normal, he has no respiratory distress his lungs are clear at this time.      DIAGNOSTIC STUDIES / PROCEDURES    EKG      LABS      RADIOLOGY  DX-CHEST-2 VIEWS   Final Result      Peribronchial thickening with interstitial infiltrates which can be seen in the clinical setting of viral infection.            COURSE  Pertinent Labs & Imaging studies reviewed. (See chart for details)  Patient's pulse ox mg is normal, chest x-ray shows a probable viral illness but he does have a immune compromise due to his chronic renal failure so will be placed on antibiotics to help prevent bacterial infection.  He is hemodynamically stable and has no respiratory distress and stable for outpatient treatment.  The patient will not drink alcohol nor drive with prescribed medications. The patient will return for worsening symptoms and is stable at the time of discharge. The patient verbalizes understanding and will comply.    FINAL IMPRESSION  1. Acute bronchitis, unspecified organism    2. Chronic renal failure, unspecified CKD stage               Electronically signed by: German Rosario, 3/19/2019 11:22 AM

## 2019-06-06 NOTE — DISCHARGE INSTRUCTIONS
ACTIVITY: Rest and take it easy for the first 24 hours.  A responsible adult is recommended to remain with you during that time.  It is normal to feel sleepy.  We encourage you to not do anything that requires balance, judgment or coordination.    MILD FLU-LIKE SYMPTOMS ARE NORMAL. YOU MAY EXPERIENCE GENERALIZED MUSCLE ACHES, THROAT IRRITATION, HEADACHE AND/OR SOME NAUSEA.    FOR 24 HOURS DO NOT:  Drive, operate machinery or run household appliances.  Drink beer or alcoholic beverages.   Make important decisions or sign legal documents.    SPECIAL INSTRUCTIONS: *  Keep dressing in place for 48 hours then OK to remove  OK to shower in 48 hours once dressing is removed  After dressing is removed leave open to air  No strenuous activity until cleared by MD**    DIET: To avoid nausea, slowly advance diet as tolerated, avoiding spicy or greasy foods for the first day.  Add more substantial food to your diet according to your physician's instructions.  Babies can be fed formula or breast milk as soon as they are hungry.  INCREASE FLUIDS AND FIBER TO AVOID CONSTIPATION.    SURGICAL DRESSING/BATHING: *Keep clean and dry**    FOLLOW-UP APPOINTMENT:  A follow-up appointment should be arranged with your doctor; call to schedule.    You should CALL YOUR PHYSICIAN if you develop:  Fever greater than 101 degrees F.  Pain not relieved by medication, or persistent nausea or vomiting.  Excessive bleeding (blood soaking through dressing) or unexpected drainage from the wound.  Extreme redness or swelling around the incision site, drainage of pus or foul smelling drainage.  Inability to urinate or empty your bladder within 8 hours.  Problems with breathing or chest pain.    You should call 911 if you develop problems with breathing or chest pain.  If you are unable to contact your doctor or surgical center, you should go to the nearest emergency room or urgent care center.    Physician's telephone #: *Dr Moses [phone]**    If any  questions arise, call your doctor.  If your doctor is not available, please feel free to call the Surgical Center at [phone].  The Center is open Monday through Friday from 7AM to 7PM.  You can also call the HEALTH HOTLINE open 24 hours/day, 7 days/week and speak to a nurse at [phone], or toll free at [phone].    A registered nurse may call you a few days after your surgery to see how you are doing after your procedure.    MEDICATIONS: Resume taking daily medication.  Take prescribed pain medication with food.  If no medication is prescribed, you may take non-aspirin pain medication if needed.  PAIN MEDICATION CAN BE VERY CONSTIPATING.  Take a stool softener or laxative such as senokot, pericolace, or milk of magnesia if needed.    Prescription given for *Oxycodone and Cleocin**.  Last pain medication given at *_________________________**.    If your physician has prescribed pain medication that includes Acetaminophen (Tylenol), do not take additional Acetaminophen (Tylenol) while taking the prescribed medication.    Depression / Suicide Risk    As you are discharged from this [facility] facility, it is important to learn how to keep safe from harming yourself.    Recognize the warning signs:  · Abrupt changes in personality, positive or negative- including increase in energy   · Giving away possessions  · Change in eating patterns- significant weight changes-  positive or negative  · Change in sleeping patterns- unable to sleep or sleeping all the time   · Unwillingness or inability to communicate  · Depression  · Unusual sadness, discouragement and loneliness  · Talk of wanting to die  · Neglect of personal appearance   · Rebelliousness- reckless behavior  · Withdrawal from people/activities they love  · Confusion- inability to concentrate     If you or a loved one observes any of these behaviors or has concerns about self-harm, here's what you can do:  · Talk about it- your feelings and  reasons for harming yourself  · Remove any means that you might use to hurt yourself (examples: pills, rope, extension cords, firearm)  · Get professional help from the community (Mental Health, Substance Abuse, psychological counseling)  · Do not be alone:Call your Safe Contact- someone whom you trust who will be there for you.  · Call your local CRISIS HOTLINE 163-1634 or 982-613-2149  · Call your local Children's Mobile Crisis Response Team [place] (580) 639-0773 or www.[employer]  · Call the toll free National Suicide Prevention Hotlines   · National Suicide Prevention Lifeline 628-062-SVHG (0809)  National Hope Line Network 800-SUICIDE (155-6518)

## 2019-06-06 NOTE — ANESTHESIA QCDR
2019 [facility] Clinical Data Registry (for Quality Improvement)     Postoperative nausea/vomiting risk protocol (Adult = 18 yrs and Pediatric 3-17 yrs)- (430 and 463)  General inhalation anesthetic (NOT TIVA) with PONV risk factors: Yes  Provision of anti-emetic therapy with at least 2 different classes of agents: Yes   Patient DID NOT receive anti-emetic therapy and reason is documented in Medical Record:  N/A    Multimodal Pain Management- (AQI59)  Patient undergoing Elective Surgery (i.e. Outpatient, or ASC, or Prescheduled Surgery prior to Hospital Admission): Yes  Use of Multimodal Pain Management, two or more drugs and/or interventions, NOT including systemic opioids: Yes   Exception: Documented allergy to multiple classes of analgesics:  N/A    PACU assessment of acute postoperative pain prior to Anesthesia Care End- Applies to Patients Age = 18- (ABG7)  Initial PACU pain score is which of the following: < 7/10  Patient unable to report pain score: N/A    Post-anesthetic transfer of care checklist/protocol to PACU/ICU- (426 and 427)  Upon conclusion of case, patient transferred to which of the following locations: PACU/Non-ICU  Use of transfer checklist/protocol: Yes  Exclusion: Service Performed in Patient Hospital Room (and thus did not require transfer): N/A    PACU Reintubation- (AQI31)  General anesthesia requiring endotracheal intubation (ETT) along with subsequent extubation in OR or PACU: No  Required reintubation in the PACU: N/A  Extubation was a planned trial documented in the medical record prior to removal of the original airway device: N/A    Unplanned admission to ICU related to anesthesia service up through end of PACU care- (MD51)  Unplanned admission to ICU (not initially anticipated at anesthesia start time): No

## 2019-06-06 NOTE — ANESTHESIA PROCEDURE NOTES
Airway  Date/Time: 6/6/2019 2:22 PM  Performed by: FLAQUITA AVILEZ  Authorized by: FLAQUITA AVILEZ     Location:  OR  Urgency:  Elective  Indications for Airway Management:  Anesthesia  Spontaneous Ventilation: absent    Sedation Level:  Deep  Preoxygenated: Yes    Final Airway Type:  Supraglottic airway  Final Supraglottic Airway:  Standard LMA  SGA Size:  4  Number of Attempts at Approach:  1

## 2019-06-06 NOTE — ANESTHESIA TIME REPORT
Anesthesia Start and Stop Event Times     Date Time Event    6/6/2019 1415 Anesthesia Start     1512 Anesthesia Stop        Responsible Staff  06/06/19    Name Role Begin End    Toney Dominguez M.D. Anesth 1415 1512        Preop Diagnosis (Free Text):  Pre-op Diagnosis     HIDRADENITIS SUPPURATIVA         Preop Diagnosis (Codes):  Diagnosis Information     Diagnosis Code(s):         Post op Diagnosis  Hidradenitis axillaris      Premium Reason  A. 3PM - 7AM    Comments:

## 2019-06-06 NOTE — OR NURSING
1511 Pt transferred to PACU. Report received from OR RN and anesthesia. Appears to have no distress at this time. VS stable, respirations even and unlabored. L axilla with gauze and tape dressing, CDI.    1642 Pt more awake. Tolerating sips of water. No c/o pain or nausea. Pt's ride called -  left updating on POC.     1710 Pt disconnected from monitor to dress. Pt's ride states she is over in [facility] and will be over shortly.     1740 Pt and friend educated on discharge instructions. Verbalized understanding. All questions answered. All belongings are with patient. Pt discharged home.

## 2019-06-06 NOTE — OR SURGEON
Immediate Post OP Note    PreOp Diagnosis: hidradenitis suppurativa left axilla    PostOp Diagnosis: same  Procedure(s):  EXCISION, MASS, GENERAL- FOR HIDRADENITIS SUPPURATIVA ON LEFT AXILLA - Wound Class: Clean Contaminated    Surgeon(s):  Shaquille Moses M.D.    Anesthesiologist/Type of Anesthesia:  Anesthesiologist: Toney Dominguez M.D./General    Surgical Staff:  Circulator: Heather Mclaughlin R.N.  Scrub Person: Mark Hand    Specimens removed if any:  * No specimens in log *    Estimated Blood Loss: 3 ml    Findings: inflammatory and infected tissue    Complications: none        6/6/2019 3:08 PM Shaquille Moses M.D.

## 2019-06-06 NOTE — H&P
Surgery Plastic History & Physical Note    Date  6/6/2019    Primary Care Physician  Pcp Pt States None    CC  Hydradenitis suppurativa    HPI  This is a 29 y.o. male who presented with recurrent infections of left axilla    Past Medical History:   Diagnosis Date   • Anemia    • Breath shortness     with chf exacerbation/fluid overload   • Changing skin lesion 8/17/2009   • Chest pain 11/18/2014   • CHF (congestive heart failure) ([facility]) 1/27/2014   • Encounter for renal dialysis     Britany Valiente Sat dav[facility]   • ESRF (end stage renal failure) ([facility])    • HF (heart failure), systolic ([facility])    • History of anemia     due to renal function   • Hypertension    • Pneumonia 8/2015   • Seizure ([facility]) 2016       Past Surgical History:   Procedure Laterality Date   • AV FISTULA REVISION Left 12/10/2018    Procedure: AV FISTULA REVISION - UPPER EXTREMITY AV FISTULA REDUCTION DUE TO SKIN DEGENERATION WITH LIGATION;  Surgeon: Anselmo Felix M.D.;  Location: SURGERY [facility];  Service: General   • AV FISTULA CREATION Right 05/2018   • PARATHYROID EXPLORATION  8/10/2016    Procedure: PARATHYROID EXPLORATION with BILATERAL NIMS NERVE monitoring and cervical thymectomy ;  Surgeon: Topher Ramirez M.D.;  Location: SURGERY SAME DAY [facility];  Service:    • AV FISTULA CREATION  12/22/2014    Performed by Anselmo Felix M.D. at SURGERY [facility]   • AV FISTULA REVISION  10/28/2013    Performed by Anselmo Felix M.D. at [facility]   • APPENDECTOMY LAPAROSCOPIC  11/10/2009    Performed by VLADISLAV PALACIO at [facility]   • AV FISTULA REVISION  10/13/2009    Performed by ANSELMO FELIX at [facility]   • CATH PLACEMENT  10/13/2009    Performed by ANSELMO FELIX at [facility]   • AV FISTULA CREATION  2005    left arm       Current Facility-Administered Medications   Medication Dose Route Frequency Provider Last Rate Last Dose   • lactated ringers infusion   Intravenous  Continuous Shaquille Moses M.D. 10 mL/hr at 06/06/19 1351         Social History     Social History   • Marital status: Single     Spouse name: N/A   • Number of children: N/A   • Years of education: N/A     Occupational History   • Not on file.     Social History Main Topics   • Smoking status: Former Smoker     Packs/day: 0.50     Years: 5.00     Types: Cigarettes     Quit date: 11/28/2016   • Smokeless tobacco: Never Used   • Alcohol use Yes      Comment: 2-3/per month   • Drug use: No   • Sexual activity: No     Other Topics Concern   • Not on file     Social History Narrative   • No narrative on file       Family History   Problem Relation Age of Onset   • Depression Mother    • No Known Problems Sister    • No Known Problems Sister    • No Known Problems Sister        Allergies  Vancomycin    Review of Systems  Non contributory    Physical Exam    Vital Signs  Blood Pressure: 109/72   Temperature: 36.3 °C (97.4 °F)   Pulse: 80   Respiration: 20   Pulse Oximetry: 98 %     inflammatory nodules left axilla  Lungs are clear  Heart is RR, no murmer    Labs:                    Radiology:  No orders to display         Assessment/Plan:  Hydradenitis suppurativa left axilla  Procedure(s):  EXCISION, MASS, GENERAL- FOR HIDRADENITIS SUPPURATIVA ON LEFT AXILLA

## 2019-06-06 NOTE — ANESTHESIA PREPROCEDURE EVALUATION
Relevant Problems   (+) CHF (congestive heart failure) ([facility])   (+) Cardiomyopathy due to hypertension, with heart failure ([facility]) ef 30%   (+) Chest pain   (+) Dialysis patient ([facility])   (+) ESRD (end stage renal disease) ([facility])   (+) ESRD on hemodialysis ([facility])   (+) End stage renal disease ([facility])   (+) HTN (hypertension)   (+) Hypertension   (+) Renal failure       Physical Exam    Airway   Mallampati: II  TM distance: >3 FB  Neck ROM: full       Cardiovascular - normal exam  Rhythm: regular  Rate: normal  (-) murmur     Dental - normal exam         Pulmonary - normal exam  Breath sounds clear to auscultation     Abdominal    Neurological - normal exam                 Anesthesia Plan    ASA 3   ASA physical status 3 criteria: moderate reduction of ejection fraction and ESRD undergoing regularly scheduled dialysis    Plan - general       Airway plan will be LMA        Induction: intravenous    Postoperative Plan: Postoperative administration of opioids is intended.    Pertinent diagnostic labs and testing reviewed    Informed Consent:    Anesthetic plan and risks discussed with patient.    Use of blood products discussed with: patient whom consented to blood products.

## 2019-06-06 NOTE — ANESTHESIA POSTPROCEDURE EVALUATION
Patient: Otoniel Langston    Procedure Summary     Date:  06/06/19 Room / Location:  [facility] ROOM 25 / SURGERY SAME DAY [facility]    Anesthesia Start:  1415 Anesthesia Stop:  1512    Procedure:  EXCISION, MASS, GENERAL- FOR HIDRADENITIS SUPPURATIVA ON LEFT AXILLA (Left Arm Upper) Diagnosis:  (HIDRADENITIS SUPPURATIVA )    Surgeon:  Shaquille Moses M.D. Responsible Provider:  Toney Dominguez M.D.    Anesthesia Type:  general ASA Status:  3          Final Anesthesia Type: general  Last vitals  BP   Blood Pressure: 109/72    Temp   36.3 °C (97.4 °F)    Pulse   Pulse: 80   Resp   20    SpO2   98 %      Anesthesia Post Evaluation    Patient location during evaluation: PACU  Patient participation: complete - patient participated  Level of consciousness: awake and alert  Pain score: 0    Airway patency: patent  Anesthetic complications: no  Cardiovascular status: hemodynamically stable  Respiratory status: acceptable  Hydration status: euvolemic    PONV: none           Nurse Pain Score: 0 (NPRS)

## 2019-06-07 NOTE — OP REPORT
DATE OF SERVICE:  06/06/2019    PREOPERATIVE DIAGNOSIS:  Hidradenitis suppurativa of left axilla.    POSTOPERATIVE DIAGNOSIS:  Hidradenitis suppurativa of left axilla.    PROCEDURE PERFORMED:  Excision of hidradenitis suppurativa of left axilla with   intermediate closure.    SURGEON:  Shaquille Moses MD    ASSISTANT:  None.    ANESTHESIOLOGIST:  Toney Dominguez MD    OPERATIVE INDICATIONS:  Patient is 29.  He has had longstanding hidradenitis.    The hidradenitis has responded nicely to antibiotic treatment to his right   axilla, but the left axilla is still very inflammatory, painful and draining.    We decided to proceed with surgical excision to see if we could help the   disease free recurrence interval.  The patient understood that there is a high   likelihood of wound healing complication.  Patient understood the risks,   benefits and alternatives and wished to proceed.    OPERATIVE DESCRIPTION:  After induction of general endotracheal anesthesia,   the patient's left axilla was clipped and then prepped and draped sterilely.    An elliptical excision of the indurated inflammatory purulent soft tissue was   excised.  This included skin and subcutaneous tissue making sure that we   included the sebaceous glandular tissue.  The wound was made hemostatic.    Wound was closed in layers.  Sterile dressing and a compressive wrap were   applied.  Patient was extubated and taken to recovery room in good condition.       ____________________________________     SHAQUILLE MOSES MD    SWW / NTS    DD:  06/06/2019 15:10:16  DT:  06/06/2019 17:43:01    D#:  [id number]  Job#:  [id number]

## 2019-06-13 NOTE — ADDENDUM NOTE
Encounter addended by: Delaney Perez R.N. on: 6/13/2019 12:27 PM<BR>    Actions taken: Visit Navigator Flowsheet section accepted

## 2019-10-13 NOTE — H&P
Hospital Medicine History & Physical Note    Date of Service  10/12/2019    Primary Care Physician  Pcp Pt States None    Consultants  Hospice    Code Status  DNR/DNI/comfort care    Chief Complaint  Shortness of breath    History of Presenting Illness  29 y.o. male who presented 10/12/2019 with shortness of breath on end-stage renal failure refusing dialysis.  Patient at this point wants only comfort care measures and does not want any further treatment.  Patient at this point will be admitted to our unit for comfort care measures.  He will be given pain management anxiety management we will request  to help with the family with the grieving process and burial.  Will ask  services to give them a service.    Review of Systems  Review of Systems   Unable to perform ROS: Acuity of condition       Past Medical History   has a past medical history of Anemia, Breath shortness, Changing skin lesion (8/17/2009), Chest pain (11/18/2014), CHF (congestive heart failure) ([facility]) (1/27/2014), Encounter for renal dialysis, ESRF (end stage renal failure) ([facility]), HF (heart failure), systolic ([facility]), History of anemia, Hypertension, Pneumonia (8/2015), and Seizure ([facility]) (2016).    Surgical History   has a past surgical history that includes av fistula creation (2005); av fistula revision (10/13/2009); cath placement (10/13/2009); appendectomy laparoscopic (11/10/2009); av fistula revision (10/28/2013); av fistula creation (12/22/2014); parathyroid exploration (8/10/2016); av fistula creation (Right, 05/2018); av fistula revision (Left, 12/10/2018); and mass excision general (Left, 6/6/2019).     Family History  family history includes Depression in his mother; No Known Problems in his sister, sister, and sister.     Social History   reports that he quit smoking about 2 years ago. His smoking use included cigarettes. He has a 2.50 pack-year smoking history. He has never used smokeless tobacco. He reports that he  drinks alcohol. He reports that he does not use drugs.    Allergies  Allergies   Allergen Reactions   • Vancomycin Itching     Pt states itching.       Medications  Prior to Admission Medications   Prescriptions Last Dose Informant Patient Reported? Taking?   LORazepam (ATIVAN) 2 MG/ML Conc   Yes No   Sig: Take 0.5 mg by mouth every 6 hours as needed (anxiety or short of breath).   OXYCODONE HCL PO   Yes No   Sig: Place 5 mg under tongue every 2 hours as needed (moderate/severe pain or shortness of breath).   carvedilol (COREG) 12.5 MG Tab  Patient No No   Sig: Take 1 Tab by mouth 2 times a day, with meals.   haloperidol (HALDOL) 2 MG/ML Conc   Yes No   Sig: Take 0.5 mg by mouth Once. Repeat in 2 hours if nausea symptoms show improvement  Indications: Nausea/Vomiting   hyoscyamine (LEVSIN) 0.125 MG SL Tab   Yes No   Sig: Take 125 mcg by mouth every 6 hours as needed for Other (excessive secretions).   levETIRAcetam (KEPPRA) 500 MG Tab  Patient No No   Si tab po bid, and also on HD day, take an extra tab right after dialysis.   lisinopril (PRINIVIL) 10 MG Tab  Patient No No   Sig: TAKE ONE TABLET BY MOUTH ONCE DAILY   ondansetron (ZOFRAN) 4 MG Tab tablet   Yes No   Sig: Take 4 mg by mouth every 6 hours as needed for Nausea/Vomiting.    Indications: Nausea and Vomiting   oxyCODONE-acetaminophen (PERCOCET) 5-325 MG Tab   Yes No   Sig: Take 1 Tab by mouth every 8 hours as needed. moderate to severe pain   sennosides (NATURAL SENNA LAXATIVE) 8.6 MG Tab   Yes No   Sig: Take 8.6-17.2 mg by mouth 2 times a day as needed (constipation).      Facility-Administered Medications: None       Physical Exam  Temp:  [36.7 °C (98 °F)] 36.7 °C (98 °F)  Pulse:  [115-182] 115  Resp:  [20] 20  BP: (148-161)/() 148/95  SpO2:  [52 %-92 %] 52 %    Physical Exam   Constitutional: He appears well-developed and well-nourished. He is uncooperative. He appears toxic. Face mask in place.   HENT:   Head: Normocephalic and atraumatic.    Nose: Nose normal.   Eyes: Right eye exhibits no discharge. Left eye exhibits no discharge.   Neck: Normal range of motion. Neck supple.   Cardiovascular: Tachycardia present.   No murmur heard.  Pulmonary/Chest: Accessory muscle usage present. Tachypnea noted. He is in respiratory distress. He has decreased breath sounds in the right upper field, the right middle field, the right lower field, the left upper field, the left middle field and the left lower field.   Abdominal: Soft. Bowel sounds are normal.   Musculoskeletal: He exhibits edema.   Neurological: He is disoriented and unresponsive. GCS eye subscore is 1. GCS verbal subscore is 1. GCS motor subscore is 1.   Skin: Skin is warm and dry.   Psychiatric: Cognition and memory are impaired. He is noncommunicative.   Nursing note and vitals reviewed.      Laboratory:          No results for input(s): ALTSGPT, ASTSGOT, ALKPHOSPHAT, TBILIRUBIN, DBILIRUBIN, GAMMAGT, AMYLASE, LIPASE, ALB, PREALBUMIN, GLUCOSE in the last 72 hours.      No results for input(s): NTPROBNP in the last 72 hours.      No results for input(s): TROPONINT in the last 72 hours.    Urinalysis:    No results found     Imaging:  No orders to display         Assessment/Plan:  I anticipate this patient is appropriate for observation status at this time.    ESRD (end stage renal disease) ([facility])- (present on admission)  Assessment & Plan  Patient with end-stage renal failure refusing at this point dialysis.  Patient comes in with acute respiratory distress.  At this point the patient will be admitted for comfort care measures.  Will optimize pain management anxiety management.  Family at this point will be given grief counseling.      VTE prophylaxis: None, comfort care at this point

## 2019-10-13 NOTE — ED PROVIDER NOTES
"ED Provider Note    CHIEF COMPLAINT  Chief Complaint   Patient presents with   • Ventricular Tachycardia       HPI  Otoniel Langston is a 29 y.o. male who presents for evaluation of chest pain, very complicated past medical history including end-stage renal disease on dialysis since age 15, severe heart failure, the patient decided 2 weeks ago that he would no longer continue medical treatment and was placed on hospice.  EMS was called today by the hospice nurse, the patient had chest pain that was uncontrolled with his oral medications, upon arrival of EMS the patient was found to have a wide-complex tachycardia concerning for ventricular tachycardia.  At this point the EMS crew was presented with a POLST form indicating comfort measures only although at this point that form was invalid at the patient was awake and able to answer questions appropriately.  He told EMS crew that he wanted to be out of pain.  The immediate intervention provided to relieve his pain was a cardioversion, the patient converted to a narrow complex rhythm according to EMS and his pain resolved.  He however went back into what appeared to be a wide-complex tachycardia, this happened multiple times, the patient is again end-stage renal disease and has not had dialysis in a couple weeks, he was treated appropriately for hyperkalemia in an effort to terminate his abnormal cardiac rhythm and relieve his pain.  He is brought here.  The patient tells me that all he wants is to \"die without pain.\"  He provides no other history at this time    REVIEW OF SYSTEMS  Unobtainable secondary to clinical condition    PAST MEDICAL HISTORY  Past Medical History:   Diagnosis Date   • Anemia    • Breath shortness     with chf exacerbation/fluid overload   • Changing skin lesion 8/17/2009   • Chest pain 11/18/2014   • CHF (congestive heart failure) ([facility]) 1/27/2014   • Encounter for renal dialysis     [facility]   • ESRF (end stage renal " failure) ([facility])    • HF (heart failure), systolic ([facility])    • History of anemia     due to renal function   • Hypertension    • Pneumonia 2015   • Seizure (HCC) 2016       FAMILY HISTORY  Family History   Problem Relation Age of Onset   • Depression Mother    • No Known Problems Sister    • No Known Problems Sister    • No Known Problems Sister        SOCIAL HISTORY  Social History     Tobacco Use   • Smoking status: Former Smoker     Packs/day: 0.50     Years: 5.00     Pack years: 2.50     Types: Cigarettes     Last attempt to quit: 2016     Years since quittin.8   • Smokeless tobacco: Never Used   Substance Use Topics   • Alcohol use: Yes     Comment: 2-3/per month   • Drug use: No       SURGICAL HISTORY  Past Surgical History:   Procedure Laterality Date   • MASS EXCISION GENERAL Left 2019    Procedure: EXCISION, MASS, GENERAL- FOR HIDRADENITIS SUPPURATIVA ON LEFT AXILLA;  Surgeon: Shaquille Moses M.D.;  Location: SURGERY SAME DAY [facility];  Service: Plastics   • AV FISTULA REVISION Left 12/10/2018    Procedure: AV FISTULA REVISION - UPPER EXTREMITY AV FISTULA REDUCTION DUE TO SKIN DEGENERATION WITH LIGATION;  Surgeon: Anselmo Felix M.D.;  Location: SURGERY [facility];  Service: General   • AV FISTULA CREATION Right 2018   • PARATHYROID EXPLORATION  8/10/2016    Procedure: PARATHYROID EXPLORATION with BILATERAL NIMS NERVE monitoring and cervical thymectomy ;  Surgeon: Topher Ramirez M.D.;  Location: SURGERY SAME DAY [facility];  Service:    • AV FISTULA CREATION  2014    Performed by Anselmo Felix M.D. at SURGERY [facility]   • AV FISTULA REVISION  10/28/2013    Performed by Anselmo Felix M.D. at [facility]   • APPENDECTOMY LAPAROSCOPIC  11/10/2009    Performed by VLADISLAV PALACIO at [facility]   • AV FISTULA REVISION  10/13/2009    Performed by ANSELMO FELIX at [facility]   • CATH PLACEMENT  10/13/2009    Performed by LUIZ  "ANSELMO CHEN at SURGERY [facility]   • AV FISTULA CREATION  2005    left arm       CURRENT MEDICATIONS  I personally reviewed the medication list in the charting documentation.     ALLERGIES  Allergies   Allergen Reactions   • Vancomycin Itching     Pt states itching.       MEDICAL RECORD  I have reviewed patient's medical record and pertinent results are listed above.      PHYSICAL EXAM  VITAL SIGNS: /95   Pulse (!) 180  Temp 36.7 °C (98 °F) (Temporal)   Resp 20   Ht 1.727 m (5' 8\")   Wt 65.8 kg (145 lb)   SpO2 (!) 85%   BMI 22.05 kg/m²    Constitutional: In extremis, appears to be in pain, diaphoretic  HENT: Mucus membranes dry.    Eyes: No scleral icterus. Normal conjunctiva   Neck: Supple, comfortable, nonpainful range of motion.   Cardiovascular: Extreme tachycardia, regular   thorax & Lungs: Increased respiratory effort, bibasilar crackles  Abdomen: Soft, with no tenderness  Skin: Diaphoretic    COURSE & MEDICAL DECISION MAKING  I have reviewed any medical record information, laboratory studies and radiographic results as noted above.    Encounter Summary: This is a 29 y.o. male on hospice secondary to end-stage renal disease and severe congestive heart failure, comes in with chest pain and was found to be in V. tach, the patient again is a hospice patient but voiced to EMS that he would like to have his pain resolved and was agreeable to cardioversion electrically for pain control.  He was cardioverted, had a conversion to a narrow complex rhythm and pain relief, that wide-complex rhythm reoccurred again the patient's pain returned.  Presumably he is hyperkalemic secondary to his renal failure and has not had dialysis in a couple weeks since going on hospice, I was able to converse with the patient, he answers questions appropriately and tells me that he just wants to \"die without pain.\"  I think based on his decision and her hospice and discontinue treatment for his renal disease and his " congestive heart failure along with his request here to die without pain and at this time refusing other treatment, I think honoring his wishes would be best performed by administering pain medication and not further treating his presumed hyperkalemia or wide-complex tachycardia.  I did speak with Ortiz, the hospice nurse as well as Dr. Saucedo, the hospice physician, and both are in agreement with the treatment plan.  The patient was treated with Dilaudid, he was treated with 2 doses of 1 mg until his pain was adequately controlled here in the emergency department and will be admitted for further hospice care.      DISPOSITION: Admit for hospice      FINAL IMPRESSION  1. ESRD on hemodialysis (HCC)    2. Ventricular tachycardia (HCC)    3. Hospice care patient           This dictation was created using voice recognition software. The accuracy of the dictation is limited to the abilities of the software. I expect there may be some errors of grammar and possibly content. The nursing notes were reviewed and certain aspects of this information were incorporated into this note.    Electronically signed by: Ortega Stewart, 10/12/2019 5:21 PM

## 2019-10-13 NOTE — DISCHARGE SUMMARY
Death Summary    Cause of Death  Acute hypoxic respiratory failure due to fluid overload due to end-stage renal failure due to unknown causes.    Comorbid Conditions at the Time of Death  Active Problems:    ESRD (end stage renal disease) ([facility]) POA: Yes  Resolved Problems:    * No resolved hospital problems. *      History of Presenting Illness and Hospital Course  This is a 29 y.o. male admitted 10/12/2019 with shortness of breath and worsening respiratory status.  Patient was known to have end-stage renal failure was previously on dialysis however took himself off dialysis.  He did not want to have dialysis any further.  Upon further discussion the patient made himself comfort care and requested to be admitted just for pain management and comfort measures.  The patient thus was admitted at 1757.  The patient was made comfortable.  At the time of the admission the patient was already obtunded and not responding.  The patient was thus given pain management comfort measures and the patient quietly passed away at 1838.  Patient is discharged to the [facility].     Death Date: 10/12/19   Death Time: 1838         Pronounced By (RN1): Imani Stephen  Pronounced By (RN2): Katia Christianson

## 2019-10-13 NOTE — PROGRESS NOTES
Administered 1mg/0.5mL of ativan at 1820. Attempted to waste remaining 1mg/0.5mL but [facility] Called [facility] tech but they were unable to rectify issue. Waste of remaining 1mg/0.5mL thrown into sharps container witnessed by 2nd RN at [facility].

## 2019-10-13 NOTE — ED TRIAGE NOTES
Chief Complaint   Patient presents with   • Ventricular Tachycardia     1637: Pt bib ems, hospice pt, arrived vtach with pulse. Pt received 5 shocks, 60mg Lidocaine and gtt, sodium bicarb and gram of lidocaine.   Pt continues to be vtach  1637: Dr.Di lion at bedside, pt awakens to voice talking to erp.   1650: hospice nurse talking to erp  1701: order for to d/c all meds , comfort care only. Medicated for pain.

## 2019-10-13 NOTE — DISCHARGE PLANNING
Called by staff on CDU to please call a [employer]ian[place] speaking (if possible)  to come and see family.  Patient has just  and family would like to see .  Father Dawood called and will be coming.   Calm

## 2019-10-13 NOTE — ASSESSMENT & PLAN NOTE
Patient with end-stage renal failure refusing at this point dialysis.  Patient comes in with acute respiratory distress.  At this point the patient will be admitted for comfort care measures.  Will optimize pain management anxiety management.  Family at this point will be given grief counseling.

## 2019-10-13 NOTE — PROGRESS NOTES
Family called RN to bedside. Pt pronounced dead by 2 RNs at 1838. Family at bedside in distress. Comfort measures and privacy provided.

## 2021-11-02 NOTE — ED AVS SNAPSHOT
2/21/2019          Otoniel Wing  [address]  [place] [address]    Dear Otoniel:    [facility] wants to ensure your discharge home is safe and you or your loved ones have had all your questions answered regarding your care after you leave the hospital.    You may receive a telephone call within two days of your discharge.  This call is to make certain you understand your discharge instructions as well as ensure we provided you with the best care possible during your stay with us.     The call will only last approximately 3-5 minutes and will be done by a nurse.    Once again, we want to ensure your discharge home is safe and that you have a clear understanding of any next steps in your care.  If you have any questions or concerns, please do not hesitate to contact us, we are here for you.  Thank you for choosing [facility] for your healthcare needs.    Sincerely,    Rafael Pascual    [facility]         Detail Level: Detailed Patient Specific Counseling (Will Not Stick From Patient To Patient): Ok to use Clobetasol on the raised scar on the right upper back. Silicone Sheets Recommendations: Medela Hydrogel sheets daily. Sunscreen Recommendations: SPF 30 or higher. Vit D Supplementation Recommendations: 3430-9308 IU daily. Sunscreen Recommendations: Full Spectrum SPF 30 Skin Checks Recommendations: Patient instructed to perform full skin self examinations every 2-3 months.  ABCDE’s of moles discussed. Detail Level: Simple

## (undated) DEVICE — SUTURE 4-0 SILK 12 X 18 INCH - (36/BX)

## (undated) DEVICE — SET LEADWIRE 5 LEAD BEDSIDE DISPOSABLE ECG (1SET OF 5/EA)

## (undated) DEVICE — SUCTION INSTRUMENT YANKAUER BULBOUS TIP W/O VENT (50EA/CA)

## (undated) DEVICE — GLOVE BIOGEL SZ 7.5 SURGICAL PF LTX - (50PR/BX 4BX/CA)

## (undated) DEVICE — DRAPE LAPAROTOMY T SHEET - (12EA/CA)

## (undated) DEVICE — SLEEVE, VASO, THIGH, MED

## (undated) DEVICE — ELECTRODE DUAL RETURN W/ CORD - (50/PK)

## (undated) DEVICE — PROTECTOR ULNA NERVE - (36PR/CA)

## (undated) DEVICE — LACTATED RINGERS INJ 1000 ML - (14EA/CA 60CA/PF)

## (undated) DEVICE — CANISTER SUCTION 3000ML MECHANICAL FILTER AUTO SHUTOFF MEDI-VAC NONSTERILE LF DISP  (40EA/CA)

## (undated) DEVICE — GLOVE BIOGEL SZ 6 PF LATEX - (50EA/BX 4BX/CA)

## (undated) DEVICE — SUTURE 4-0 30CM STRATAFIX SPIRAL PS-2 (12EA/BX)

## (undated) DEVICE — PACK MINOR BASIN - (2EA/CA)

## (undated) DEVICE — NEPTUNE 4 PORT MANIFOLD - (20/PK)

## (undated) DEVICE — SUTURE 6-0 PROLENE BV-1 D/A 24 (36PK/BX)"

## (undated) DEVICE — MASK, LARYNGEAL AIRWAY #4

## (undated) DEVICE — SYRINGE SAFETY 10 ML 18 GA X 1 1/2 BLUNT LL (100/BX 4BX/CA)

## (undated) DEVICE — DRAPE LOWER EXTREMETY - (6/CA)

## (undated) DEVICE — CLOSURE SKIN STRIP 1/2 X 4 IN - (STERI STRIP) (50/BX 4BX/CA)

## (undated) DEVICE — SODIUM CHL IRRIGATION 0.9% 1000ML (12EA/CA)

## (undated) DEVICE — GOWN WARMING STANDARD FLEX - (30/CA)

## (undated) DEVICE — SUTURE GENERAL

## (undated) DEVICE — ELECTRODE 850 FOAM ADHESIVE - HYDROGEL RADIOTRNSPRNT (50/PK)

## (undated) DEVICE — PAD PREP 24 X 48 CUFFED - (100/CA)

## (undated) DEVICE — CATHETER IV 20 GA X 1-1/4 ---SURG.& SDS ONLY--- (50EA/BX)

## (undated) DEVICE — TUBING CLEARLINK DUO-VENT - C-FLO (48EA/CA)

## (undated) DEVICE — HEAD HOLDER JUNIOR/ADULT

## (undated) DEVICE — GELAQUASONIC 100 ULTRASOUND - 48/BX 20GM STERILE FOIL POUCH

## (undated) DEVICE — SYRINGE 20 ML LL (50EA/BX 4BX/CA)

## (undated) DEVICE — BAG DECANTER (50EA/CS)

## (undated) DEVICE — COVER PROBE STERILE CONE (12EA/CA)

## (undated) DEVICE — VESSELOOP MINI BLUE STERILE - SURG-I-LOOP (10EA/BX)

## (undated) DEVICE — DRESSING XEROFORM 1X8 - (50/BX 4BX/CA)

## (undated) DEVICE — MASK ANESTHESIA ADULT  - (100/CA)

## (undated) DEVICE — CHLORAPREP 26 ML APPLICATOR - ORANGE TINT(25/CA)

## (undated) DEVICE — DRAPE SURGICAL U 77X120 - (10/CA)

## (undated) DEVICE — CANISTER SUCTION RIGID RED 1500CC (40EA/CA)

## (undated) DEVICE — WATER IRRIGATION STERILE 1000ML (12EA/CA)

## (undated) DEVICE — SUTURE CV

## (undated) DEVICE — SET EXTENSION WITH 2 PORTS (48EA/CA) ***PART #2C8610 IS A SUBSTITUTE*****

## (undated) DEVICE — KIT ANESTHESIA W/CIRCUIT & 3/LT BAG W/FILTER (20EA/CA)

## (undated) DEVICE — GOWN SURGEONS LARGE - (32/CA)

## (undated) DEVICE — BLADE SURGICAL #11 - (50/BX)

## (undated) DEVICE — KIT ROOM DECONTAMINATION

## (undated) DEVICE — TUBE CONNECTING SUCTION - CLEAR PLASTIC STERILE 72 IN (50EA/CA)

## (undated) DEVICE — SUTURE 3-0 VICRYL PLUS SH - 8X 18 INCH (12/BX)

## (undated) DEVICE — DRESSING TRANSPARENT FILM TEGADERM 4 X 4.75" (50EA/BX)"

## (undated) DEVICE — GLOVE BIOGEL SZ 8 SURGICAL PF LTX - (50PR/BX 4BX/CA)

## (undated) DEVICE — Device

## (undated) DEVICE — KIT  I.V. START (100EA/CA)

## (undated) DEVICE — PACK LOWER EXTREMITY - (2/CA)

## (undated) DEVICE — SENSOR SPO2 NEO LNCS ADHESIVE (20/BX) SEE USER NOTES

## (undated) DEVICE — STERI STRIP COMPOUND BENZOIN - TINCTURE 0.6ML WITH APPLICATOR (40EA/BX)

## (undated) DEVICE — SPONGE GAUZESTER 4 X 4 4PLY - (128PK/CA)

## (undated) DEVICE — DRESSING ABDOMINAL PAD STERILE 8 X 10" (360EA/CA)"

## (undated) DEVICE — SUTURE 2-0 PROLENE SH D/A (36PK/BX)

## (undated) DEVICE — DRAPE LARGE 3 QUARTER - (20/CA)

## (undated) DEVICE — CLIP SM INTNL HRZN TI ESCP LGT - (24EA/PK 25PK/BX)

## (undated) DEVICE — SOD. CHL. INJ. 0.9% 250 ML - (36/CA 50CA/PF)

## (undated) DEVICE — CLIP MED INTNL HRZN TI ESCP - (25/BX)